# Patient Record
Sex: MALE | Race: WHITE | NOT HISPANIC OR LATINO | Employment: UNEMPLOYED | URBAN - METROPOLITAN AREA
[De-identification: names, ages, dates, MRNs, and addresses within clinical notes are randomized per-mention and may not be internally consistent; named-entity substitution may affect disease eponyms.]

---

## 2019-05-14 ENCOUNTER — HOSPITAL ENCOUNTER (EMERGENCY)
Facility: HOSPITAL | Age: 34
Discharge: HOME | End: 2019-05-14
Attending: EMERGENCY MEDICINE | Admitting: EMERGENCY MEDICINE
Payer: MEDICARE

## 2019-05-14 VITALS
TEMPERATURE: 98.1 F | SYSTOLIC BLOOD PRESSURE: 144 MMHG | DIASTOLIC BLOOD PRESSURE: 77 MMHG | RESPIRATION RATE: 18 BRPM | WEIGHT: 170 LBS | OXYGEN SATURATION: 98 % | HEART RATE: 123 BPM

## 2019-05-14 DIAGNOSIS — T40.1X1A HEROIN OVERDOSE (HCC): Primary | ICD-10-CM

## 2019-05-14 DIAGNOSIS — F41.9 ANXIETY: ICD-10-CM

## 2019-05-14 PROCEDURE — 99284 EMERGENCY DEPT VISIT MOD MDM: CPT

## 2019-05-14 PROCEDURE — 99283 EMERGENCY DEPT VISIT LOW MDM: CPT | Performed by: EMERGENCY MEDICINE

## 2019-05-14 RX ORDER — NALOXONE HYDROCHLORIDE 1 MG/ML
INJECTION INTRAMUSCULAR; INTRAVENOUS; SUBCUTANEOUS
Status: DISCONTINUED
Start: 2019-05-14 | End: 2019-05-14 | Stop reason: HOSPADM

## 2019-05-14 RX ORDER — HYDROXYZINE HYDROCHLORIDE 25 MG/1
25 TABLET, FILM COATED ORAL EVERY 6 HOURS PRN
Qty: 16 TABLET | Refills: 0 | Status: SHIPPED | OUTPATIENT
Start: 2019-05-14 | End: 2019-05-15

## 2019-05-15 ENCOUNTER — HOSPITAL ENCOUNTER (EMERGENCY)
Facility: HOSPITAL | Age: 34
Discharge: HOME | End: 2019-05-16
Attending: EMERGENCY MEDICINE | Admitting: EMERGENCY MEDICINE
Payer: MEDICARE

## 2019-05-15 DIAGNOSIS — T50.901A OVERDOSE: Primary | ICD-10-CM

## 2019-05-15 DIAGNOSIS — T40.1X1A HEROIN OVERDOSE (HCC): ICD-10-CM

## 2019-05-15 LAB
ALBUMIN SERPL BCP-MCNC: 5.2 G/DL (ref 3–5.2)
ALP SERPL-CCNC: 46 U/L (ref 43–122)
ALT SERPL W P-5'-P-CCNC: 27 U/L (ref 9–52)
ANION GAP SERPL CALCULATED.3IONS-SCNC: 13 MMOL/L (ref 5–14)
APAP SERPL-MCNC: <10 UG/ML (ref 10–20)
AST SERPL W P-5'-P-CCNC: 42 U/L (ref 17–59)
BILIRUB SERPL-MCNC: 0.5 MG/DL
BUN SERPL-MCNC: 18 MG/DL (ref 5–25)
CALCIUM SERPL-MCNC: 9.9 MG/DL (ref 8.4–10.2)
CHLORIDE SERPL-SCNC: 100 MMOL/L (ref 97–108)
CO2 SERPL-SCNC: 27 MMOL/L (ref 22–30)
CREAT SERPL-MCNC: 1.15 MG/DL (ref 0.7–1.5)
ERYTHROCYTE [DISTWIDTH] IN BLOOD BY AUTOMATED COUNT: 12.8 %
ETHANOL SERPL-MCNC: <10 MG/DL (ref 0–10)
GFR SERPL CREATININE-BSD FRML MDRD: 84 ML/MIN/1.73SQ M
GLUCOSE SERPL-MCNC: 92 MG/DL (ref 70–99)
HCT VFR BLD AUTO: 46.3 % (ref 41–53)
HGB BLD-MCNC: 15.1 G/DL (ref 13.5–17.5)
LYMPHOCYTES # BLD AUTO: 1.29 THOUSAND/UL (ref 0.5–4)
LYMPHOCYTES # BLD AUTO: 6 % (ref 25–45)
MCH RBC QN AUTO: 30.7 PG (ref 26–34)
MCHC RBC AUTO-ENTMCNC: 32.7 G/DL (ref 31–36)
MCV RBC AUTO: 94 FL (ref 80–100)
MONOCYTES # BLD AUTO: 0.86 THOUSAND/UL (ref 0.2–0.9)
MONOCYTES NFR BLD AUTO: 4 % (ref 1–10)
NEUTS SEG # BLD: 19.35 THOUSAND/UL (ref 1.8–7.8)
NEUTS SEG NFR BLD AUTO: 90 %
PLATELET # BLD AUTO: 269 THOUSANDS/UL (ref 150–450)
PLATELET BLD QL SMEAR: ADEQUATE
PMV BLD AUTO: 7.6 FL (ref 8.9–12.7)
POTASSIUM SERPL-SCNC: 5.4 MMOL/L (ref 3.6–5)
PROT SERPL-MCNC: 8.4 G/DL (ref 5.9–8.4)
RBC # BLD AUTO: 4.93 MILLION/UL (ref 4.5–5.9)
RBC MORPH BLD: NORMAL
SALICYLATES SERPL-MCNC: <1 MG/DL (ref 10–30)
SODIUM SERPL-SCNC: 140 MMOL/L (ref 137–147)
TOTAL CELLS COUNTED SPEC: 100
WBC # BLD AUTO: 21.5 THOUSAND/UL (ref 4.5–11)

## 2019-05-15 PROCEDURE — 80320 DRUG SCREEN QUANTALCOHOLS: CPT | Performed by: EMERGENCY MEDICINE

## 2019-05-15 PROCEDURE — 80329 ANALGESICS NON-OPIOID 1 OR 2: CPT | Performed by: EMERGENCY MEDICINE

## 2019-05-15 PROCEDURE — 96374 THER/PROPH/DIAG INJ IV PUSH: CPT

## 2019-05-15 PROCEDURE — 99284 EMERGENCY DEPT VISIT MOD MDM: CPT

## 2019-05-15 PROCEDURE — 93005 ELECTROCARDIOGRAM TRACING: CPT

## 2019-05-15 PROCEDURE — 85027 COMPLETE CBC AUTOMATED: CPT | Performed by: EMERGENCY MEDICINE

## 2019-05-15 PROCEDURE — 85007 BL SMEAR W/DIFF WBC COUNT: CPT | Performed by: EMERGENCY MEDICINE

## 2019-05-15 PROCEDURE — 99284 EMERGENCY DEPT VISIT MOD MDM: CPT | Performed by: EMERGENCY MEDICINE

## 2019-05-15 PROCEDURE — 80053 COMPREHEN METABOLIC PANEL: CPT | Performed by: EMERGENCY MEDICINE

## 2019-05-15 PROCEDURE — 96361 HYDRATE IV INFUSION ADD-ON: CPT

## 2019-05-15 PROCEDURE — 36415 COLL VENOUS BLD VENIPUNCTURE: CPT | Performed by: EMERGENCY MEDICINE

## 2019-05-15 RX ORDER — LORAZEPAM 2 MG/ML
2 INJECTION INTRAMUSCULAR ONCE
Status: COMPLETED | OUTPATIENT
Start: 2019-05-15 | End: 2019-05-15

## 2019-05-15 RX ADMIN — SODIUM CHLORIDE 1000 ML: 9 INJECTION, SOLUTION INTRAVENOUS at 20:01

## 2019-05-15 RX ADMIN — LORAZEPAM 2 MG: 2 INJECTION INTRAMUSCULAR; INTRAVENOUS at 23:25

## 2019-05-15 RX ADMIN — SODIUM CHLORIDE 1000 ML: 9 INJECTION, SOLUTION INTRAVENOUS at 21:56

## 2019-05-16 VITALS
SYSTOLIC BLOOD PRESSURE: 126 MMHG | TEMPERATURE: 96.9 F | WEIGHT: 162.04 LBS | OXYGEN SATURATION: 96 % | HEART RATE: 97 BPM | RESPIRATION RATE: 18 BRPM | DIASTOLIC BLOOD PRESSURE: 63 MMHG

## 2019-05-16 LAB
AMPHETAMINES SERPL QL SCN: NEGATIVE
ATRIAL RATE: 113 BPM
BARBITURATES UR QL: NEGATIVE
BENZODIAZ UR QL: NEGATIVE
COCAINE UR QL: NEGATIVE
METHADONE UR QL: NEGATIVE
OPIATES UR QL SCN: POSITIVE
P AXIS: 60 DEGREES
PCP UR QL: NEGATIVE
PR INTERVAL: 134 MS
QRS AXIS: 50 DEGREES
QRSD INTERVAL: 80 MS
QT INTERVAL: 326 MS
QTC INTERVAL: 447 MS
T WAVE AXIS: 48 DEGREES
THC UR QL: NEGATIVE
VENTRICULAR RATE: 113 BPM

## 2019-05-16 PROCEDURE — 93010 ELECTROCARDIOGRAM REPORT: CPT | Performed by: INTERNAL MEDICINE

## 2019-05-16 PROCEDURE — 80307 DRUG TEST PRSMV CHEM ANLYZR: CPT | Performed by: EMERGENCY MEDICINE

## 2019-05-16 PROCEDURE — 96361 HYDRATE IV INFUSION ADD-ON: CPT

## 2019-05-16 RX ORDER — NICOTINE 21 MG/24HR
21 PATCH, TRANSDERMAL 24 HOURS TRANSDERMAL ONCE
Status: DISCONTINUED | OUTPATIENT
Start: 2019-05-16 | End: 2019-05-16

## 2019-05-16 RX ORDER — NICOTINE 21 MG/24HR
21 PATCH, TRANSDERMAL 24 HOURS TRANSDERMAL ONCE
Status: DISCONTINUED | OUTPATIENT
Start: 2019-05-16 | End: 2019-05-16 | Stop reason: HOSPADM

## 2019-05-16 RX ORDER — LORAZEPAM 0.5 MG/1
1 TABLET ORAL ONCE
Status: COMPLETED | OUTPATIENT
Start: 2019-05-16 | End: 2019-05-16

## 2019-05-16 RX ADMIN — NICOTINE 21 MG: 21 PATCH, EXTENDED RELEASE TRANSDERMAL at 01:00

## 2019-05-16 RX ADMIN — LORAZEPAM 1 MG: 0.5 TABLET ORAL at 07:40

## 2021-05-10 ENCOUNTER — OFFICE VISIT (OUTPATIENT)
Dept: FAMILY MEDICINE CLINIC | Facility: CLINIC | Age: 36
End: 2021-05-10
Payer: MEDICARE

## 2021-05-10 ENCOUNTER — TELEPHONE (OUTPATIENT)
Dept: FAMILY MEDICINE CLINIC | Facility: CLINIC | Age: 36
End: 2021-05-10

## 2021-05-10 VITALS
BODY MASS INDEX: 25.54 KG/M2 | SYSTOLIC BLOOD PRESSURE: 100 MMHG | WEIGHT: 178.4 LBS | HEIGHT: 70 IN | TEMPERATURE: 95.6 F | DIASTOLIC BLOOD PRESSURE: 70 MMHG

## 2021-05-10 DIAGNOSIS — F41.9 ANXIETY: Primary | ICD-10-CM

## 2021-05-10 DIAGNOSIS — F90.0 ATTENTION DEFICIT HYPERACTIVITY DISORDER (ADHD), PREDOMINANTLY INATTENTIVE TYPE: ICD-10-CM

## 2021-05-10 DIAGNOSIS — F11.90 OPIATE USE: ICD-10-CM

## 2021-05-10 PROBLEM — F98.8 ATTENTION DEFICIT DISORDER (ADD): Status: ACTIVE | Noted: 2021-05-10

## 2021-05-10 PROCEDURE — 3725F SCREEN DEPRESSION PERFORMED: CPT | Performed by: FAMILY MEDICINE

## 2021-05-10 PROCEDURE — 99203 OFFICE O/P NEW LOW 30 MIN: CPT | Performed by: FAMILY MEDICINE

## 2021-05-10 PROCEDURE — 3008F BODY MASS INDEX DOCD: CPT | Performed by: FAMILY MEDICINE

## 2021-05-10 RX ORDER — CLONAZEPAM 2 MG/1
2 TABLET ORAL 2 TIMES DAILY
COMMUNITY

## 2021-05-10 NOTE — TELEPHONE ENCOUNTER
Today at checkout, patient stated he needed to note for [facility] that they might find evidence of Percocet, as he took one today  Have added this to med match list for testing

## 2021-05-10 NOTE — PROGRESS NOTES
Assessment/Plan:  Direct test done at this time  Patient will be called with results  Diagnoses and all orders for this visit:    Anxiety    Attention deficit hyperactivity disorder (ADHD), predominantly inattentive type    Other orders  -     clonazePAM (KlonoPIN) 2 mg tablet; Take 2 mg by mouth 2 (two) times a day            Subjective:        Patient ID: Lise Babcock is a 37 y o  male  Patient is a new patient to establish care  Patient with history of anxiety as well as ADHD  Patient has use Klonopin in the past for with good results  Patient was taking Adderall for ADD  Patient having difficulty focusing and concentrating  Patient making mistakes at work  Patient has use medical marijuana  Patient is on methadone  The following portions of the patient's history were reviewed and updated as appropriate: allergies, current medications, past family history, past medical history, past social history, past surgical history and problem list       Review of Systems   Constitutional: Negative  HENT: Negative  Eyes: Negative  Respiratory: Negative  Cardiovascular: Negative  Gastrointestinal: Negative  Endocrine: Negative  Genitourinary: Negative  Musculoskeletal: Negative  Skin: Negative  Allergic/Immunologic: Negative  Neurological: Negative  Hematological: Negative  Psychiatric/Behavioral: Positive for decreased concentration  Objective:      BMI Counseling: Body mass index is 25 6 kg/m²  The BMI is above normal  Nutrition recommendations include decreasing portion sizes  Exercise recommendations include moderate physical activity 150 minutes/week  /70 (BP Location: Right arm, Patient Position: Sitting, Cuff Size: Standard)   Temp (!) 95 6 °F (35 3 °C) (Tympanic)   Ht 5' 10" (1 778 m)   Wt 80 9 kg (178 lb 6 4 oz)   BMI 25 60 kg/m²          Physical Exam  Vitals signs and nursing note reviewed     Constitutional: General: He is not in acute distress  Appearance: Normal appearance  He is not ill-appearing, toxic-appearing or diaphoretic  HENT:      Head: Normocephalic and atraumatic  Right Ear: Tympanic membrane, ear canal and external ear normal  There is no impacted cerumen  Left Ear: Tympanic membrane, ear canal and external ear normal  There is no impacted cerumen  Nose: Nose normal  No congestion or rhinorrhea  Mouth/Throat:      Mouth: Mucous membranes are moist       Pharynx: No oropharyngeal exudate or posterior oropharyngeal erythema  Eyes:      General: No scleral icterus  Right eye: No discharge  Left eye: No discharge  Extraocular Movements: Extraocular movements intact  Conjunctiva/sclera: Conjunctivae normal       Pupils: Pupils are equal, round, and reactive to light  Neck:      Musculoskeletal: Normal range of motion and neck supple  No neck rigidity or muscular tenderness  Vascular: No carotid bruit  Cardiovascular:      Rate and Rhythm: Normal rate and regular rhythm  Pulses: Normal pulses  Heart sounds: Normal heart sounds  No murmur  No friction rub  No gallop  Pulmonary:      Effort: Pulmonary effort is normal  No respiratory distress  Breath sounds: Normal breath sounds  No stridor  No wheezing, rhonchi or rales  Chest:      Chest wall: No tenderness  Musculoskeletal: Normal range of motion  General: No swelling, tenderness, deformity or signs of injury  Right lower leg: No edema  Left lower leg: No edema  Lymphadenopathy:      Cervical: No cervical adenopathy  Skin:     General: Skin is warm and dry  Capillary Refill: Capillary refill takes less than 2 seconds  Coloration: Skin is not jaundiced  Findings: No bruising, erythema, lesion or rash  Neurological:      General: No focal deficit present  Mental Status: He is alert and oriented to person, place, and time        Cranial Nerves: No cranial nerve deficit  Sensory: No sensory deficit  Motor: No weakness  Coordination: Coordination normal       Gait: Gait normal    Psychiatric:         Mood and Affect: Mood normal          Behavior: Behavior normal          Thought Content:  Thought content normal          Judgment: Judgment normal

## 2021-05-12 LAB
1OH-MIDAZOLAM UR-MCNC: NEGATIVE NG/ML
6MAM UR QL: NEGATIVE NG/ML
A-OH ALPRAZ UR-MCNC: NEGATIVE NG/ML
A-OH-TRIAZOLAM UR-MCNC: NEGATIVE NG/ML
AMPHETAMINES UR QL: NEGATIVE NG/ML
BARBITURATES UR QL: NEGATIVE NG/ML
BENZODIAZ UR QL: POSITIVE NG/ML
BZE UR QL: NEGATIVE NG/ML
CREAT UR-MCNC: 38.9 MG/DL
EDDP UR-MCNC: 7550 NG/ML
ETHANOL UR QL: POSITIVE NG/ML
ETHYL GLUCURONIDE UR-MCNC: 1740 NG/ML
ETHYL SULFATE UR-MCNC: 732 NG/ML
LORAZEPAM UR-MCNC: NEGATIVE NG/ML
METHADONE UR QL: POSITIVE NG/ML
METHADONE UR-MCNC: 2342 NG/ML
NORDIAZEPAM UR-MCNC: NEGATIVE NG/ML
OPIATES UR QL: NEGATIVE NG/ML
OXAZEPAM UR-MCNC: NEGATIVE NG/ML
OXIDANTS UR QL: NEGATIVE MCG/ML
OXYCODONE UR QL: NEGATIVE NG/ML
PCP UR QL: NEGATIVE NG/ML
PH UR: 6.7 [PH] (ref 4.5–9)
SL AMB AMINOCLONAZEPAM: 967 NG/ML
SL AMB HYDROXYETHYLFLURAZEPAM: NEGATIVE NG/ML
TEMAZEPAM UR-MCNC: NEGATIVE NG/ML
THC UR QL: POSITIVE NG/ML
THC UR-MCNC: 55 NG/ML

## 2021-05-17 ENCOUNTER — TELEPHONE (OUTPATIENT)
Dept: FAMILY MEDICINE CLINIC | Facility: CLINIC | Age: 36
End: 2021-05-17

## 2021-05-17 NOTE — TELEPHONE ENCOUNTER
----- Message from Jannette Macario DO sent at 5/16/2021  7:27 AM EDT -----  Call patient  Urine drug screen shows alcohol, benzos, marijuana, methadone  Suggest continuing with methadone clinic  Our office will not be prescribing any controlled substances which will include narcotics, benzos, amphetamines for ADD    Suggest patient sees Psychiatry and Psychology and possible detox physician due to multiple substances being used

## 2021-10-15 ENCOUNTER — HOSPITAL ENCOUNTER (EMERGENCY)
Facility: HOSPITAL | Age: 36
Discharge: HOME | End: 2021-10-15
Attending: EMERGENCY MEDICINE
Payer: MEDICARE

## 2021-10-15 VITALS
HEART RATE: 66 BPM | DIASTOLIC BLOOD PRESSURE: 60 MMHG | SYSTOLIC BLOOD PRESSURE: 109 MMHG | TEMPERATURE: 99 F | RESPIRATION RATE: 18 BRPM | OXYGEN SATURATION: 95 %

## 2021-10-15 DIAGNOSIS — T50.901A ACCIDENTAL OVERDOSE, INITIAL ENCOUNTER: Primary | ICD-10-CM

## 2021-10-15 LAB — GLUCOSE SERPL-MCNC: 110 MG/DL (ref 65–140)

## 2021-10-15 PROCEDURE — 99284 EMERGENCY DEPT VISIT MOD MDM: CPT | Performed by: PHYSICIAN ASSISTANT

## 2021-10-15 PROCEDURE — 82948 REAGENT STRIP/BLOOD GLUCOSE: CPT

## 2021-10-15 PROCEDURE — 99284 EMERGENCY DEPT VISIT MOD MDM: CPT

## 2021-12-16 ENCOUNTER — OFFICE VISIT (OUTPATIENT)
Dept: FAMILY MEDICINE CLINIC | Facility: CLINIC | Age: 36
End: 2021-12-16

## 2021-12-16 VITALS
RESPIRATION RATE: 18 BRPM | HEIGHT: 70 IN | HEART RATE: 129 BPM | OXYGEN SATURATION: 96 % | BODY MASS INDEX: 23.77 KG/M2 | TEMPERATURE: 97.6 F | WEIGHT: 166 LBS | DIASTOLIC BLOOD PRESSURE: 74 MMHG | SYSTOLIC BLOOD PRESSURE: 126 MMHG

## 2021-12-16 DIAGNOSIS — F41.9 ANXIETY: Primary | ICD-10-CM

## 2021-12-16 PROCEDURE — 99213 OFFICE O/P EST LOW 20 MIN: CPT | Performed by: FAMILY MEDICINE

## 2021-12-16 RX ORDER — VENLAFAXINE HYDROCHLORIDE 37.5 MG/1
37.5 CAPSULE, EXTENDED RELEASE ORAL
Qty: 90 CAPSULE | Refills: 0 | Status: SHIPPED | OUTPATIENT
Start: 2021-12-16

## 2021-12-16 RX ORDER — HYDROXYZINE HYDROCHLORIDE 25 MG/1
25 TABLET, FILM COATED ORAL EVERY 6 HOURS PRN
Qty: 90 TABLET | Refills: 0 | Status: SHIPPED | OUTPATIENT
Start: 2021-12-16

## 2023-05-06 ENCOUNTER — HOSPITAL ENCOUNTER (EMERGENCY)
Facility: HOSPITAL | Age: 38
Discharge: HOME | End: 2023-05-06
Attending: EMERGENCY MEDICINE | Admitting: EMERGENCY MEDICINE

## 2023-05-06 VITALS
SYSTOLIC BLOOD PRESSURE: 118 MMHG | DIASTOLIC BLOOD PRESSURE: 71 MMHG | RESPIRATION RATE: 20 BRPM | TEMPERATURE: 98.1 F | OXYGEN SATURATION: 93 % | HEART RATE: 125 BPM

## 2023-05-06 DIAGNOSIS — F19.90 DRUG USE: Primary | ICD-10-CM
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
## 2023-05-11 ENCOUNTER — TELEPHONE (OUTPATIENT)
Dept: FAMILY MEDICINE CLINIC | Facility: CLINIC | Age: 38
End: 2023-05-11

## 2023-05-11 NOTE — TELEPHONE ENCOUNTER
Pt was a N/S for his appt today  I tried to call him to let him know, but could not leave a message because his mailbox was full

## 2023-05-22 ENCOUNTER — OFFICE VISIT (OUTPATIENT)
Dept: FAMILY MEDICINE CLINIC | Facility: CLINIC | Age: 38
End: 2023-05-22

## 2023-05-22 VITALS
HEART RATE: 72 BPM | OXYGEN SATURATION: 98 % | WEIGHT: 155 LBS | HEIGHT: 70 IN | TEMPERATURE: 97.8 F | BODY MASS INDEX: 22.19 KG/M2 | DIASTOLIC BLOOD PRESSURE: 70 MMHG | SYSTOLIC BLOOD PRESSURE: 110 MMHG

## 2023-05-22 DIAGNOSIS — F41.9 ANXIETY: ICD-10-CM

## 2023-05-22 DIAGNOSIS — Z00.00 WELL ADULT EXAM: ICD-10-CM

## 2023-05-22 DIAGNOSIS — F90.0 ATTENTION DEFICIT HYPERACTIVITY DISORDER (ADHD), PREDOMINANTLY INATTENTIVE TYPE: Primary | ICD-10-CM

## 2023-05-22 DIAGNOSIS — Z11.59 NEED FOR HEPATITIS C SCREENING TEST: ICD-10-CM

## 2023-05-22 DIAGNOSIS — Z11.4 SCREENING FOR HIV (HUMAN IMMUNODEFICIENCY VIRUS): ICD-10-CM

## 2023-05-22 DIAGNOSIS — F11.11 NARCOTIC ABUSE IN REMISSION (HCC): ICD-10-CM

## 2023-05-22 RX ORDER — NALOXONE HYDROCHLORIDE 4 MG/.1ML
SPRAY NASAL
Qty: 1 EACH | Refills: 1 | Status: SHIPPED | OUTPATIENT
Start: 2023-05-22 | End: 2024-05-21

## 2023-05-22 RX ORDER — CLONAZEPAM 2 MG/1
2 TABLET ORAL 2 TIMES DAILY
Qty: 60 TABLET | Refills: 0 | Status: SHIPPED | OUTPATIENT
Start: 2023-05-22 | End: 2023-06-21

## 2023-05-22 RX ORDER — DEXTROAMPHETAMINE SACCHARATE, AMPHETAMINE ASPARTATE, DEXTROAMPHETAMINE SULFATE AND AMPHETAMINE SULFATE 5; 5; 5; 5 MG/1; MG/1; MG/1; MG/1
1 TABLET ORAL 2 TIMES DAILY
Qty: 60 TABLET | Refills: 0 | Status: SHIPPED | OUTPATIENT
Start: 2023-05-22 | End: 2023-06-21

## 2023-05-22 RX ORDER — DEXTROAMPHETAMINE SACCHARATE, AMPHETAMINE ASPARTATE, DEXTROAMPHETAMINE SULFATE AND AMPHETAMINE SULFATE 5; 5; 5; 5 MG/1; MG/1; MG/1; MG/1
1 TABLET ORAL 2 TIMES DAILY
COMMUNITY
Start: 2023-04-26 | End: 2023-05-22 | Stop reason: SDUPTHER
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
## 2023-05-26 LAB
6MAM UR QL CFM: NEGATIVE NG/ML
7AMINOCLONAZEPAM UR QL CFM: NORMAL NG/ML
A-OH ALPRAZ UR QL CFM: NEGATIVE NG/ML
ACCEPTABLE CREAT UR QL: NORMAL MG/DL
ACCEPTIBLE SP GR UR QL: NORMAL
AMPHET UR QL CFM: NORMAL NG/ML
BUPRENORPHINE UR QL CFM: NEGATIVE NG/ML
BUTALBITAL UR QL CFM: NEGATIVE NG/ML
BZE UR QL CFM: NEGATIVE NG/ML
CODEINE UR QL CFM: NEGATIVE NG/ML
EDDP UR QL CFM: NORMAL NG/ML
ETHYL GLUCURONIDE UR QL CFM: NEGATIVE NG/ML
ETHYL SULFATE UR QL SCN: NEGATIVE NG/ML
EUTYLONE UR QL: NEGATIVE NG/ML
FENTANYL UR QL CFM: ABNORMAL NG/ML
GLIADIN IGG SER IA-ACNC: NEGATIVE NG/ML
HYDROCODONE UR QL CFM: NEGATIVE NG/ML
HYDROMORPHONE UR QL CFM: NEGATIVE NG/ML
LORAZEPAM UR QL CFM: NEGATIVE NG/ML
ME-PHENIDATE UR QL CFM: NEGATIVE NG/ML
MEPERIDINE UR QL CFM: NEGATIVE NG/ML
METHADONE UR QL CFM: NORMAL NG/ML
METHAMPHET UR QL CFM: NEGATIVE NG/ML
MORPHINE UR QL CFM: ABNORMAL NG/ML
NALTREXONE UR QL CFM: NEGATIVE NG/ML
NITRITE UR QL: NORMAL UG/ML
NORBUPRENORPHINE UR QL CFM: NEGATIVE NG/ML
NORDIAZEPAM UR QL CFM: NEGATIVE NG/ML
NORFENTANYL UR QL CFM: NEGATIVE NG/ML
NORHYDROCODONE UR QL CFM: NEGATIVE NG/ML
NORMEPERIDINE UR QL CFM: NEGATIVE NG/ML
NOROXYCODONE UR QL CFM: NEGATIVE NG/ML
OXAZEPAM UR QL CFM: NEGATIVE NG/ML
OXYCODONE UR QL CFM: NEGATIVE NG/ML
OXYMORPHONE UR QL CFM: NEGATIVE NG/ML
PARA-FLUOROFENTANYL QUANTIFICATION: NEGATIVE NG/ML
PHENOBARB UR QL CFM: NEGATIVE NG/ML
RESULT ALL_PRESCRIBED MEDS AND SPECIAL INSTRUCTIONS: NORMAL
SECOBARBITAL UR QL CFM: NEGATIVE NG/ML
SL AMB 4-ANPP QUANTIFICATION: NEGATIVE NG/ML
SL AMB 5F-ADB-M7 METABOLITE QUANTIFICATION: NEGATIVE NG/ML
SL AMB 7-OH-MITRAGYNINE (KRATOM ALKALOID) QUANTIFICATION: NEGATIVE NG/ML
SL AMB AB-FUBINACA-M3 METABOLITE QUANTIFICATION: NEGATIVE NG/ML
SL AMB ACETYL FENTANYL QUANTIFICATION: NEGATIVE NG/ML
SL AMB ACETYL NORFENTANYL QUANTIFICATION: NEGATIVE NG/ML
SL AMB ACRYL FENTANYL QUANTIFICATION: NEGATIVE NG/ML
SL AMB CARFENTANIL QUANTIFICATION: NEGATIVE NG/ML
SL AMB CTHC (MARIJUANA METABOLITE) QUANTIFICATION: ABNORMAL NG/ML
SL AMB DEXTRORPHAN (DEXTROMETHORPHAN METABOLITE) QUANT: NEGATIVE NG/ML
SL AMB GABAPENTIN QUANTIFICATION: NEGATIVE
SL AMB JWH018 METABOLITE QUANTIFICATION: NEGATIVE NG/ML
SL AMB JWH073 METABOLITE QUANTIFICATION: NEGATIVE NG/ML
SL AMB MDMB-FUBINACA-M1 METABOLITE QUANTIFICATION: NEGATIVE NG/ML
SL AMB METHYLONE QUANTIFICATION: NEGATIVE NG/ML
SL AMB N-DESMETHYL-TRAMADOL QUANTIFICATION: NEGATIVE NG/ML
SL AMB PHENTERMINE QUANTIFICATION: NEGATIVE NG/ML
SL AMB PREGABALIN QUANTIFICATION: NEGATIVE
SL AMB RCS4 METABOLITE QUANTIFICATION: NEGATIVE NG/ML
SL AMB RITALINIC ACID QUANTIFICATION: NEGATIVE NG/ML
SMOOTH MUSCLE AB TITR SER IF: NEGATIVE NG/ML
SPECIMEN DRAWN SERPL: NEGATIVE NG/ML
SPECIMEN PH ACCEPTABLE UR: NORMAL
TAPENTADOL UR QL CFM: NEGATIVE NG/ML
TEMAZEPAM UR QL CFM: NEGATIVE NG/ML
TRAMADOL UR QL CFM: NEGATIVE NG/ML
URATE/CREAT 24H UR: NEGATIVE NG/ML

## 2023-06-19 ENCOUNTER — OFFICE VISIT (OUTPATIENT)
Dept: FAMILY MEDICINE CLINIC | Facility: CLINIC | Age: 38
End: 2023-06-19
Payer: COMMERCIAL

## 2023-06-19 VITALS
TEMPERATURE: 97.5 F | HEART RATE: 92 BPM | BODY MASS INDEX: 22.71 KG/M2 | DIASTOLIC BLOOD PRESSURE: 68 MMHG | SYSTOLIC BLOOD PRESSURE: 94 MMHG | OXYGEN SATURATION: 98 % | HEIGHT: 70 IN | WEIGHT: 158.6 LBS

## 2023-06-19 DIAGNOSIS — F90.0 ATTENTION DEFICIT HYPERACTIVITY DISORDER (ADHD), PREDOMINANTLY INATTENTIVE TYPE: ICD-10-CM

## 2023-06-19 DIAGNOSIS — F41.9 ANXIETY: ICD-10-CM

## 2023-06-19 DIAGNOSIS — Z11.4 SCREENING FOR HIV (HUMAN IMMUNODEFICIENCY VIRUS): ICD-10-CM

## 2023-06-19 DIAGNOSIS — Z00.00 ANNUAL PHYSICAL EXAM: Primary | ICD-10-CM

## 2023-06-19 DIAGNOSIS — R76.8 HEPATITIS C ANTIBODY POSITIVE IN BLOOD: ICD-10-CM

## 2023-06-19 PROCEDURE — 99395 PREV VISIT EST AGE 18-39: CPT | Performed by: FAMILY MEDICINE

## 2023-06-19 PROCEDURE — 99214 OFFICE O/P EST MOD 30 MIN: CPT | Performed by: FAMILY MEDICINE

## 2023-06-19 RX ORDER — DEXTROAMPHETAMINE SACCHARATE, AMPHETAMINE ASPARTATE, DEXTROAMPHETAMINE SULFATE AND AMPHETAMINE SULFATE 5; 5; 5; 5 MG/1; MG/1; MG/1; MG/1
1 TABLET ORAL 2 TIMES DAILY
Qty: 60 TABLET | Refills: 0 | Status: SHIPPED | OUTPATIENT
Start: 2023-06-19 | End: 2023-07-19

## 2023-06-19 RX ORDER — CLONAZEPAM 2 MG/1
2 TABLET ORAL 2 TIMES DAILY
Qty: 60 TABLET | Refills: 0 | Status: SHIPPED | OUTPATIENT
Start: 2023-06-19 | End: 2023-07-19

## 2023-06-19 NOTE — PROGRESS NOTES
[facility]    NAME: Lidia Gipson  AGE: 30 y o  SEX: male  : 1985     DATE: 2023     Assessment and Plan:     Problem List Items Addressed This Visit        Other    Attention deficit disorder (ADD)    Relevant Medications    amphetamine-dextroamphetamine (ADDERALL) 20 mg tablet    clonazePAM (KlonoPIN) 2 mg tablet    Anxiety    Relevant Medications    clonazePAM (KlonoPIN) 2 mg tablet   Other Visit Diagnoses     Annual physical exam    -  Primary    Hepatitis C antibody positive in blood        Relevant Orders    Hepatitis C Ab W/Refl To HCV RNA, Qn, PCR    Screening for HIV (human immunodeficiency virus)        Relevant Orders    HIV 1/2 AG/AB w Reflex [facility] for 2 yr old and above          Immunizations and preventive care screenings were discussed with patient today  Appropriate education was printed on patient's after visit summary  Counseling:  Alcohol/drug use: discussed moderation in alcohol intake, the recommendations for healthy alcohol use, and avoidance of illicit drug use  Dental Health: discussed importance of regular tooth brushing, flossing, and dental visits  Injury prevention: discussed safety/seat belts, safety helmets, smoke detectors, carbon dioxide detectors, and smoking near bedding or upholstery  Sexual health: discussed sexually transmitted diseases, partner selection, use of condoms, avoidance of unintended pregnancy, and contraceptive alternatives  · Exercise: the importance of regular exercise/physical activity was discussed  Recommend exercise 3-5 times per week for at least 30 minutes  Return in 1 year (on 2024)       Chief Complaint:     Chief Complaint   Patient presents with   • Well Check     Patient is here today for an annual physical        History of Present Illness:     Adult Annual Physical   Patient here for a comprehensive physical exam  The patient reports problems - see list     Diet and Physical Activity  · Diet/Nutrition: well balanced diet  · Exercise: no formal exercise  Depression Screening  PHQ-2/9 Depression Screening         General Health  · Sleep: sleeps well  · Hearing: normal - bilateral   · Vision: no vision problems  · Dental: no dental visits for >1 year   Health  · History of STDs?: yes  Review of Systems:     Review of Systems   Constitutional: Negative  HENT: Negative  Eyes: Negative  Respiratory: Negative  Cardiovascular: Negative  Gastrointestinal: Negative  Endocrine: Negative  Genitourinary: Negative  Musculoskeletal: Negative  Skin: Negative  Allergic/Immunologic: Negative  Neurological: Negative  Hematological: Negative  Psychiatric/Behavioral: Negative  All other systems reviewed and are negative  Past Medical History:     Past Medical History:   Diagnosis Date   • [facility]       Past Surgical History:     History reviewed  No pertinent surgical history     Social History:     Social History     Socioeconomic History   • Marital status: Single     Spouse name: None   • Number of children: None   • Years of education: None   • Highest education level: None   Occupational History   • Occupation: self employed gun shop   Tobacco Use   • Smoking status: Former     Types: Cigarettes   • Smokeless tobacco: Never   Vaping Use   • Vaping Use: Every day   • Substances: Nicotine   Substance and Sexual Activity   • Alcohol use: Yes     Comment: a beer a month   • Drug use: Yes     Types: Marijuana   • Sexual activity: None   Other Topics Concern   • None   Social History Narrative   • None     Social Determinants of Health     Financial Resource Strain: Low Risk  (12/16/2021)    Overall Financial Resource Strain (CARDIA)    • Difficulty of Paying Living Expenses: Not hard at all   Food Insecurity: No Food Insecurity (12/16/2021)    Hunger Vital Sign    • Worried About "Running Out of Food in the Last Year: Never true    • Ran Out of Food in the Last Year: Never true   Transportation Needs: No Transportation Needs (12/16/2021)    PRAPARE - Transportation    • Lack of Transportation (Medical): No    • Lack of Transportation (Non-Medical): No   Physical Activity: Not on file   Stress: Not on file   Social Connections: Not on file   Intimate Partner Violence: Not on file   Housing Stability: Not on file      Family History:     Family History   Problem Relation Age of Onset   • No Known Problems Mother    • No Known Problems Father       Current Medications:     Current Outpatient Medications   Medication Sig Dispense Refill   • amphetamine-dextroamphetamine (ADDERALL) 20 mg tablet Take 1 tablet (20 mg total) by mouth 2 (two) times a day Max Daily Amount: 40 mg 60 tablet 0   • clonazePAM (KlonoPIN) 2 mg tablet Take 1 tablet (2 mg total) by mouth 2 (two) times a day 60 tablet 0   • METHADONE HCL PO Take by mouth     • naloxone (NARCAN) 4 mg/0 1 mL nasal spray Administer 1 spray into a nostril  If no response after 2-3 minutes, give another dose in the other nostril using a new spray  1 each 1     No current facility-administered medications for this visit  Allergies:     No Known Allergies   Physical Exam:     BP 94/68 (BP Location: Right arm, Patient Position: Sitting, Cuff Size: Standard)   Pulse 92   Temp 97 5 °F (36 4 °C) (Tympanic)   Ht 5' 10\" (1 778 m)   Wt 71 9 kg (158 lb 9 6 oz)   SpO2 98%   BMI 22 76 kg/m²     Physical Exam  Vitals and nursing note reviewed  Constitutional:       Appearance: Normal appearance  He is well-developed  HENT:      Head: Normocephalic  Nose: Nose normal    Eyes:      Conjunctiva/sclera: Conjunctivae normal       Pupils: Pupils are equal, round, and reactive to light  Cardiovascular:      Rate and Rhythm: Normal rate and regular rhythm  Pulses: Normal pulses  Heart sounds: Normal heart sounds     Pulmonary:      Effort: " Pulmonary effort is normal       Breath sounds: Normal breath sounds  Abdominal:      General: Bowel sounds are normal       Palpations: Abdomen is soft  Musculoskeletal:         General: Normal range of motion  Cervical back: Normal range of motion and neck supple  Skin:     General: Skin is warm and dry  Neurological:      General: No focal deficit present  Mental Status: He is alert  Psychiatric:         Mood and Affect: Mood normal          Behavior: Behavior normal          Thought Content:  Thought content normal          Judgment: Judgment normal           [facility]   [id number] [facility]

## 2023-07-17 ENCOUNTER — OFFICE VISIT (OUTPATIENT)
Dept: FAMILY MEDICINE CLINIC | Facility: CLINIC | Age: 38
End: 2023-07-17
Payer: COMMERCIAL

## 2023-07-17 VITALS
HEART RATE: 86 BPM | BODY MASS INDEX: 22.9 KG/M2 | SYSTOLIC BLOOD PRESSURE: 100 MMHG | OXYGEN SATURATION: 96 % | HEIGHT: 70 IN | WEIGHT: 160 LBS | TEMPERATURE: 97.6 F | DIASTOLIC BLOOD PRESSURE: 70 MMHG | RESPIRATION RATE: 18 BRPM

## 2023-07-17 DIAGNOSIS — Z20.2 STD EXPOSURE: ICD-10-CM

## 2023-07-17 DIAGNOSIS — F41.9 ANXIETY: ICD-10-CM

## 2023-07-17 DIAGNOSIS — F90.0 ATTENTION DEFICIT HYPERACTIVITY DISORDER (ADHD), PREDOMINANTLY INATTENTIVE TYPE: Primary | ICD-10-CM

## 2023-07-17 PROCEDURE — 99214 OFFICE O/P EST MOD 30 MIN: CPT | Performed by: FAMILY MEDICINE

## 2023-07-17 RX ORDER — DEXTROAMPHETAMINE SACCHARATE, AMPHETAMINE ASPARTATE, DEXTROAMPHETAMINE SULFATE AND AMPHETAMINE SULFATE 5; 5; 5; 5 MG/1; MG/1; MG/1; MG/1
1 TABLET ORAL 2 TIMES DAILY
Qty: 60 TABLET | Refills: 0 | Status: SHIPPED | OUTPATIENT
Start: 2023-07-17 | End: 2023-08-16

## 2023-07-17 RX ORDER — CLONAZEPAM 2 MG/1
2 TABLET ORAL 2 TIMES DAILY
Qty: 60 TABLET | Refills: 0 | Status: SHIPPED | OUTPATIENT
Start: 2023-07-17 | End: 2023-07-18 | Stop reason: SDUPTHER

## 2023-07-17 NOTE — PROGRESS NOTES
Name: Jose Alvarado      : 1985      MRN: [id number]  Encounter Provider: Baltazar Christopher DO  Encounter Date: 2023   Encounter department: [address] [place] [address]     1. Attention deficit hyperactivity disorder (ADHD), predominantly inattentive type  -     amphetamine-dextroamphetamine (ADDERALL) 20 mg tablet; Take 1 tablet (20 mg total) by mouth 2 (two) times a day Max Daily Amount: 40 mg  -     clonazePAM (KlonoPIN) 2 mg tablet; Take 1 tablet (2 mg total) by mouth 2 (two) times a day    2. Anxiety  -     clonazePAM (KlonoPIN) 2 mg tablet; Take 1 tablet (2 mg total) by mouth 2 (two) times a day    3. STD exposure  -     Chlamydia/GC amplified DNA by PCR; Future  -     Chlamydia/GC amplified DNA by PCR           Subjective      HPI  Review of Systems    Current Outpatient Medications on File Prior to Visit   Medication Sig   • METHADONE HCL PO Take by mouth   • naloxone (NARCAN) 4 mg/0.1 mL nasal spray Administer 1 spray into a nostril. If no response after 2-3 minutes, give another dose in the other nostril using a new spray.    • [DISCONTINUED] amphetamine-dextroamphetamine (ADDERALL) 20 mg tablet Take 1 tablet (20 mg total) by mouth 2 (two) times a day Max Daily Amount: 40 mg   • [DISCONTINUED] clonazePAM (KlonoPIN) 2 mg tablet Take 1 tablet (2 mg total) by mouth 2 (two) times a day       Objective     /70 (BP Location: Right arm, Patient Position: Sitting, Cuff Size: Adult)   Pulse 86   Temp 97.6 °F (36.4 °C) (Tympanic)   Resp 18   Ht 5' 10" (1.778 m)   Wt 72.6 kg (160 lb)   SpO2 96%   BMI 22.96 kg/m²     Physical Exam  Baltazar Christopher DO

## 2023-07-18 DIAGNOSIS — F41.9 ANXIETY: ICD-10-CM

## 2023-07-18 DIAGNOSIS — F90.0 ATTENTION DEFICIT HYPERACTIVITY DISORDER (ADHD), PREDOMINANTLY INATTENTIVE TYPE: ICD-10-CM

## 2023-07-18 RX ORDER — CLONAZEPAM 2 MG/1
2 TABLET ORAL 2 TIMES DAILY
Qty: 60 TABLET | Refills: 0 | Status: SHIPPED | OUTPATIENT
Start: 2023-07-18 | End: 2023-08-17

## 2023-08-17 ENCOUNTER — OFFICE VISIT (OUTPATIENT)
Dept: FAMILY MEDICINE CLINIC | Facility: CLINIC | Age: 38
End: 2023-08-17
Payer: COMMERCIAL

## 2023-08-17 VITALS
WEIGHT: 157.6 LBS | HEIGHT: 70 IN | DIASTOLIC BLOOD PRESSURE: 70 MMHG | TEMPERATURE: 98.4 F | SYSTOLIC BLOOD PRESSURE: 108 MMHG | HEART RATE: 96 BPM | BODY MASS INDEX: 22.56 KG/M2 | OXYGEN SATURATION: 99 %

## 2023-08-17 DIAGNOSIS — F41.9 ANXIETY: ICD-10-CM

## 2023-08-17 DIAGNOSIS — F90.0 ATTENTION DEFICIT HYPERACTIVITY DISORDER (ADHD), PREDOMINANTLY INATTENTIVE TYPE: ICD-10-CM

## 2023-08-17 PROCEDURE — 99214 OFFICE O/P EST MOD 30 MIN: CPT | Performed by: FAMILY MEDICINE

## 2023-08-17 RX ORDER — DEXTROAMPHETAMINE SACCHARATE, AMPHETAMINE ASPARTATE, DEXTROAMPHETAMINE SULFATE AND AMPHETAMINE SULFATE 5; 5; 5; 5 MG/1; MG/1; MG/1; MG/1
1 TABLET ORAL 2 TIMES DAILY
Qty: 60 TABLET | Refills: 0 | Status: SHIPPED | OUTPATIENT
Start: 2023-08-17 | End: 2023-09-16

## 2023-08-17 RX ORDER — CLONAZEPAM 2 MG/1
2 TABLET ORAL 2 TIMES DAILY
Qty: 60 TABLET | Refills: 0 | Status: SHIPPED | OUTPATIENT
Start: 2023-08-17 | End: 2023-09-16

## 2023-08-17 NOTE — PROGRESS NOTES
Assessment/Plan:      Diagnoses and all orders for this visit:    Attention deficit hyperactivity disorder (ADHD), predominantly inattentive type  -     amphetamine-dextroamphetamine (ADDERALL) 20 mg tablet; Take 1 tablet (20 mg total) by mouth 2 (two) times a day Max Daily Amount: 40 mg  -     clonazePAM (KlonoPIN) 2 mg tablet; Take 1 tablet (2 mg total) by mouth 2 (two) times a day    Anxiety  -     clonazePAM (KlonoPIN) 2 mg tablet; Take 1 tablet (2 mg total) by mouth 2 (two) times a day          Subjective:     Patient ID: Arlin Grijalva is a 30 y.o. male. Patient presents with:  Medication Refill: Pt is here for meds refill. Pt has no other concerns. LS    He is doing well. No complaints. Medication Refill        Review of Systems   Constitutional: Negative. HENT: Negative. Eyes: Negative. Respiratory: Negative. Cardiovascular: Negative. Gastrointestinal: Negative. Endocrine: Negative. Genitourinary: Negative. Musculoskeletal: Negative. Skin: Negative. Allergic/Immunologic: Negative. Neurological: Negative. Hematological: Negative. Psychiatric/Behavioral: Negative. All other systems reviewed and are negative. Objective:     Physical Exam  Vitals and nursing note reviewed. Constitutional:       Appearance: Normal appearance. He is well-developed. HENT:      Head: Normocephalic and atraumatic. Right Ear: External ear normal.      Left Ear: External ear normal.      Nose: Nose normal.   Eyes:      Conjunctiva/sclera: Conjunctivae normal.      Pupils: Pupils are equal, round, and reactive to light. Cardiovascular:      Rate and Rhythm: Normal rate and regular rhythm. Heart sounds: Normal heart sounds. Pulmonary:      Effort: Pulmonary effort is normal.      Breath sounds: Normal breath sounds. Abdominal:      General: Bowel sounds are normal.      Palpations: Abdomen is soft. Musculoskeletal:         General: Normal range of motion. Cervical back: Normal range of motion and neck supple. Skin:     General: Skin is warm and dry. Neurological:      General: No focal deficit present. Mental Status: He is alert and oriented to person, place, and time. Deep Tendon Reflexes: Reflexes are normal and symmetric.    Psychiatric:         Mood and Affect: Mood normal.         Behavior: Behavior normal.

## 2023-09-18 ENCOUNTER — OFFICE VISIT (OUTPATIENT)
Dept: FAMILY MEDICINE CLINIC | Facility: CLINIC | Age: 38
End: 2023-09-18
Payer: COMMERCIAL

## 2023-09-18 VITALS
SYSTOLIC BLOOD PRESSURE: 108 MMHG | HEIGHT: 70 IN | BODY MASS INDEX: 22.99 KG/M2 | DIASTOLIC BLOOD PRESSURE: 70 MMHG | HEART RATE: 79 BPM | OXYGEN SATURATION: 98 % | TEMPERATURE: 97.9 F | WEIGHT: 160.6 LBS

## 2023-09-18 DIAGNOSIS — F90.0 ATTENTION DEFICIT HYPERACTIVITY DISORDER (ADHD), PREDOMINANTLY INATTENTIVE TYPE: Primary | ICD-10-CM

## 2023-09-18 DIAGNOSIS — F41.9 ANXIETY: ICD-10-CM

## 2023-09-18 PROCEDURE — 99214 OFFICE O/P EST MOD 30 MIN: CPT | Performed by: FAMILY MEDICINE

## 2023-09-18 RX ORDER — DEXTROAMPHETAMINE SACCHARATE, AMPHETAMINE ASPARTATE, DEXTROAMPHETAMINE SULFATE AND AMPHETAMINE SULFATE 5; 5; 5; 5 MG/1; MG/1; MG/1; MG/1
1 TABLET ORAL 2 TIMES DAILY
Qty: 60 TABLET | Refills: 0 | Status: SHIPPED | OUTPATIENT
Start: 2023-09-18 | End: 2023-10-18

## 2023-09-18 RX ORDER — CLONAZEPAM 2 MG/1
2 TABLET ORAL 3 TIMES DAILY
Qty: 90 TABLET | Refills: 0 | Status: SHIPPED | OUTPATIENT
Start: 2023-09-18 | End: 2023-10-18

## 2023-09-18 NOTE — PROGRESS NOTES
Name: Esme Cruz      : 1985      MRN: [id number]  Encounter Provider: Deanna Alford DO  Encounter Date: 2023   Encounter department: [facility]     1. Attention deficit hyperactivity disorder (ADHD), predominantly inattentive type  -     clonazePAM (KlonoPIN) 2 mg tablet; Take 1 tablet (2 mg total) by mouth 3 (three) times a day  -     amphetamine-dextroamphetamine (ADDERALL) 20 mg tablet; Take 1 tablet (20 mg total) by mouth 2 (two) times a day Max Daily Amount: 40 mg    2. Anxiety  -     clonazePAM (KlonoPIN) 2 mg tablet; Take 1 tablet (2 mg total) by mouth 3 (three) times a day    Continue current therapy. We will follow-up in a month. Katalina      Kirchen today for follow-up. He is doing well. Although he is sometimes running out of clonazepam because he did take an extra half or so as needed. He does continue to follow-up with the methadone program.    Review of Systems   Constitutional: Negative. HENT: Negative. Eyes: Negative. Respiratory: Negative. Cardiovascular: Negative. Gastrointestinal: Negative. Endocrine: Negative. Genitourinary: Negative. Musculoskeletal: Negative. Skin: Negative. Allergic/Immunologic: Negative. Neurological: Negative. Hematological: Negative. Psychiatric/Behavioral: Negative. All other systems reviewed and are negative. Current Outpatient Medications on File Prior to Visit   Medication Sig   • METHADONE HCL PO Take by mouth   • [DISCONTINUED] amphetamine-dextroamphetamine (ADDERALL) 20 mg tablet Take 1 tablet (20 mg total) by mouth 2 (two) times a day Max Daily Amount: 40 mg   • [DISCONTINUED] clonazePAM (KlonoPIN) 2 mg tablet Take 1 tablet (2 mg total) by mouth 2 (two) times a day   • naloxone (NARCAN) 4 mg/0.1 mL nasal spray Administer 1 spray into a nostril. If no response after 2-3 minutes, give another dose in the other nostril using a new spray. (Patient not taking: Reported on 8/17/2023)       Objective     /70 (BP Location: Right arm, Patient Position: Sitting, Cuff Size: Large)   Pulse 79   Temp 97.9 °F (36.6 °C) (Tympanic)   Ht 5' 10" (1.778 m)   Wt 72.8 kg (160 lb 9.6 oz)   SpO2 98%   BMI 23.04 kg/m²     Physical Exam  Vitals and nursing note reviewed. Constitutional:       Appearance: He is well-developed. HENT:      Head: Normocephalic and atraumatic. Eyes:      Pupils: Pupils are equal, round, and reactive to light. Cardiovascular:      Rate and Rhythm: Normal rate. Pulmonary:      Effort: Pulmonary effort is normal.      Breath sounds: No wheezing. Abdominal:      Palpations: Abdomen is soft. Musculoskeletal:      Cervical back: Normal range of motion and neck supple. Lymphadenopathy:      Cervical: No cervical adenopathy. Skin:     General: Skin is warm. Neurological:      General: No focal deficit present. Mental Status: He is alert and oriented to person, place, and time.    Psychiatric:         Mood and Affect: Mood normal.       Komal Griggs DO

## 2023-12-18 ENCOUNTER — OFFICE VISIT (OUTPATIENT)
Dept: FAMILY MEDICINE CLINIC | Facility: CLINIC | Age: 38
End: 2023-12-18
Payer: COMMERCIAL

## 2023-12-18 VITALS
HEART RATE: 90 BPM | DIASTOLIC BLOOD PRESSURE: 80 MMHG | TEMPERATURE: 98 F | SYSTOLIC BLOOD PRESSURE: 120 MMHG | WEIGHT: 167.4 LBS | HEIGHT: 70 IN | BODY MASS INDEX: 23.96 KG/M2 | OXYGEN SATURATION: 96 %

## 2023-12-18 DIAGNOSIS — F41.9 ANXIETY: ICD-10-CM

## 2023-12-18 DIAGNOSIS — F90.0 ATTENTION DEFICIT HYPERACTIVITY DISORDER (ADHD), PREDOMINANTLY INATTENTIVE TYPE: ICD-10-CM

## 2023-12-18 PROCEDURE — 99214 OFFICE O/P EST MOD 30 MIN: CPT | Performed by: FAMILY MEDICINE

## 2023-12-18 RX ORDER — DEXTROAMPHETAMINE SACCHARATE, AMPHETAMINE ASPARTATE, DEXTROAMPHETAMINE SULFATE AND AMPHETAMINE SULFATE 5; 5; 5; 5 MG/1; MG/1; MG/1; MG/1
1 TABLET ORAL 2 TIMES DAILY
Qty: 60 TABLET | Refills: 0 | Status: SHIPPED | OUTPATIENT
Start: 2023-12-18 | End: 2024-01-17

## 2023-12-18 RX ORDER — CLONAZEPAM 2 MG/1
2 TABLET ORAL 3 TIMES DAILY
Qty: 90 TABLET | Refills: 0 | Status: SHIPPED | OUTPATIENT
Start: 2023-12-18 | End: 2024-01-17

## 2023-12-18 NOTE — PROGRESS NOTES
Assessment/Plan:      Diagnoses and all orders for this visit:    Attention deficit hyperactivity disorder (ADHD), predominantly inattentive type  -     amphetamine-dextroamphetamine (ADDERALL) 20 mg tablet; Take 1 tablet (20 mg total) by mouth 2 (two) times a day Max Daily Amount: 40 mg  -     clonazePAM (KlonoPIN) 2 mg tablet; Take 1 tablet (2 mg total) by mouth 3 (three) times a day    Anxiety  -     clonazePAM (KlonoPIN) 2 mg tablet; Take 1 tablet (2 mg total) by mouth 3 (three) times a day     Continue current therapy.  He will continue counseling and follow-up at the methadone clinic.  Will follow-up in 3 months.    Subjective:     Patient ID: Kapil Cordova is a 38 y.o. male.    Patient presents with:  Medication Refill: No other problems at this time   [facility]        Medication Refill        Review of Systems   Constitutional: Negative.    HENT: Negative.     Eyes: Negative.    Respiratory: Negative.     Cardiovascular: Negative.    Gastrointestinal: Negative.    Endocrine: Negative.    Genitourinary: Negative.    Musculoskeletal: Negative.    Skin: Negative.    Allergic/Immunologic: Negative.    Neurological: Negative.    Hematological: Negative.    Psychiatric/Behavioral: Negative.     All other systems reviewed and are negative.        Objective:     Physical Exam  Vitals and nursing note reviewed.   Constitutional:       Appearance: Normal appearance. He is well-developed.   HENT:      Head: Normocephalic and atraumatic.      Right Ear: External ear normal.      Left Ear: External ear normal.      Nose: Nose normal.   Eyes:      Conjunctiva/sclera: Conjunctivae normal.      Pupils: Pupils are equal, round, and reactive to light.   Cardiovascular:      Rate and Rhythm: Normal rate and regular rhythm.      Pulses: Normal pulses.      Heart sounds: Normal heart sounds.   Pulmonary:      Effort: Pulmonary effort is normal.      Breath sounds: Normal breath sounds.   Abdominal:      General: Bowel sounds are  normal.      Palpations: Abdomen is soft.   Musculoskeletal:         General: Normal range of motion.      Cervical back: Normal range of motion and neck supple.   Skin:     General: Skin is warm and dry.   Neurological:      General: No focal deficit present.      Mental Status: He is alert and oriented to person, place, and time.      Deep Tendon Reflexes: Reflexes are normal and symmetric.   Psychiatric:         Mood and Affect: Mood normal.         Behavior: Behavior normal.

## 2024-01-17 ENCOUNTER — OFFICE VISIT (OUTPATIENT)
Dept: FAMILY MEDICINE CLINIC | Facility: CLINIC | Age: 39
End: 2024-01-17
Payer: COMMERCIAL

## 2024-01-17 VITALS
DIASTOLIC BLOOD PRESSURE: 60 MMHG | BODY MASS INDEX: 25.8 KG/M2 | OXYGEN SATURATION: 90 % | SYSTOLIC BLOOD PRESSURE: 80 MMHG | TEMPERATURE: 97.5 F | WEIGHT: 179.8 LBS | HEART RATE: 55 BPM

## 2024-01-17 DIAGNOSIS — F41.9 ANXIETY: ICD-10-CM

## 2024-01-17 DIAGNOSIS — F90.0 ATTENTION DEFICIT HYPERACTIVITY DISORDER (ADHD), PREDOMINANTLY INATTENTIVE TYPE: Primary | ICD-10-CM

## 2024-01-17 PROCEDURE — 99214 OFFICE O/P EST MOD 30 MIN: CPT | Performed by: FAMILY MEDICINE

## 2024-01-17 RX ORDER — DEXTROAMPHETAMINE SACCHARATE, AMPHETAMINE ASPARTATE, DEXTROAMPHETAMINE SULFATE AND AMPHETAMINE SULFATE 5; 5; 5; 5 MG/1; MG/1; MG/1; MG/1
1 TABLET ORAL 2 TIMES DAILY
Qty: 60 TABLET | Refills: 0 | Status: SHIPPED | OUTPATIENT
Start: 2024-01-17 | End: 2024-02-16

## 2024-01-17 RX ORDER — ALPRAZOLAM 2 MG/1
2 TABLET, ORALLY DISINTEGRATING ORAL 3 TIMES DAILY PRN
Qty: 90 TABLET | Refills: 0 | Status: SHIPPED | OUTPATIENT
Start: 2024-01-17

## 2024-01-17 RX ORDER — CLONAZEPAM 2 MG/1
2 TABLET ORAL 3 TIMES DAILY
Qty: 90 TABLET | Refills: 0 | Status: CANCELLED | OUTPATIENT
Start: 2024-01-17 | End: 2024-02-16

## 2024-01-17 NOTE — PROGRESS NOTES
Assessment/Plan:      Diagnoses and all orders for this visit:    Attention deficit hyperactivity disorder (ADHD), predominantly inattentive type  -     amphetamine-dextroamphetamine (ADDERALL) 20 mg tablet; Take 1 tablet (20 mg total) by mouth 2 (two) times a day Max Daily Amount: 40 mg    Anxiety  -     ALPRAZolam (NIRAVAM) 2 MG dissolvable tablet; Take 1 tablet (2 mg total) by mouth 3 (three) times a day as needed for anxiety     Continue to follow-up with the methadone clinic.  Follow-up here in 3 months.    Subjective:     Patient ID: Kapil Cordova is a 38 y.o. male.    He is in today for medication management.  He is requesting alprazolam instead of clonazepam.  He feels it works better for him.        Review of Systems   Constitutional: Negative.    HENT: Negative.     Eyes: Negative.    Respiratory: Negative.     Cardiovascular: Negative.    Gastrointestinal: Negative.    Endocrine: Negative.    Genitourinary: Negative.    Musculoskeletal: Negative.    Skin: Negative.    Allergic/Immunologic: Negative.    Neurological: Negative.    Hematological: Negative.    Psychiatric/Behavioral:  Positive for dysphoric mood. The patient is nervous/anxious.    All other systems reviewed and are negative.        Objective:     Physical Exam  Vitals and nursing note reviewed.   Constitutional:       Appearance: He is well-developed.   HENT:      Head: Normocephalic and atraumatic.   Eyes:      Pupils: Pupils are equal, round, and reactive to light.   Cardiovascular:      Rate and Rhythm: Normal rate.   Pulmonary:      Effort: Pulmonary effort is normal.      Breath sounds: No wheezing.   Abdominal:      Palpations: Abdomen is soft.   Musculoskeletal:      Cervical back: Normal range of motion and neck supple.   Lymphadenopathy:      Cervical: No cervical adenopathy.   Skin:     General: Skin is warm.   Neurological:      General: No focal deficit present.      Mental Status: He is alert and oriented to person, place, and  time.   Psychiatric:         Mood and Affect: Mood normal.

## 2024-02-14 ENCOUNTER — OFFICE VISIT (OUTPATIENT)
Dept: FAMILY MEDICINE CLINIC | Facility: CLINIC | Age: 39
End: 2024-02-14
Payer: COMMERCIAL

## 2024-02-14 VITALS
BODY MASS INDEX: 25.25 KG/M2 | TEMPERATURE: 98.8 F | WEIGHT: 176 LBS | DIASTOLIC BLOOD PRESSURE: 82 MMHG | HEART RATE: 107 BPM | SYSTOLIC BLOOD PRESSURE: 100 MMHG | OXYGEN SATURATION: 99 %

## 2024-02-14 DIAGNOSIS — F41.9 ANXIETY: Primary | ICD-10-CM

## 2024-02-14 DIAGNOSIS — F90.0 ATTENTION DEFICIT HYPERACTIVITY DISORDER (ADHD), PREDOMINANTLY INATTENTIVE TYPE: ICD-10-CM

## 2024-02-14 DIAGNOSIS — F41.9 ANXIETY: ICD-10-CM

## 2024-02-14 PROCEDURE — 99214 OFFICE O/P EST MOD 30 MIN: CPT | Performed by: FAMILY MEDICINE

## 2024-02-14 RX ORDER — DEXTROAMPHETAMINE SACCHARATE, AMPHETAMINE ASPARTATE, DEXTROAMPHETAMINE SULFATE AND AMPHETAMINE SULFATE 5; 5; 5; 5 MG/1; MG/1; MG/1; MG/1
1 TABLET ORAL 2 TIMES DAILY
Qty: 60 TABLET | Refills: 0 | Status: SHIPPED | OUTPATIENT
Start: 2024-02-14 | End: 2024-03-15

## 2024-02-14 RX ORDER — ALPRAZOLAM 2 MG/1
2 TABLET ORAL 3 TIMES DAILY PRN
Qty: 90 TABLET | Refills: 2 | Status: SHIPPED | OUTPATIENT
Start: 2024-02-14 | End: 2024-02-14 | Stop reason: SDUPTHER

## 2024-02-14 RX ORDER — ALPRAZOLAM 2 MG/1
2 TABLET ORAL 3 TIMES DAILY PRN
Qty: 90 TABLET | Refills: 2 | Status: SHIPPED | OUTPATIENT
Start: 2024-02-14 | End: 2024-05-14

## 2024-02-14 NOTE — TELEPHONE ENCOUNTER
OTHER PHARMACY DOES NOT HAVE THEM IN STOCK PLEASE SEND MEDICATION TO [facility] [address] [facility].

## 2024-02-14 NOTE — PROGRESS NOTES
Name: Kapil Cordova      : 1985      MRN: [id number]  Encounter Provider: Reese Gomes DO  Encounter Date: 2024   Encounter department: [facility]    Assessment & Plan     1. Anxiety  -     ALPRAZolam (XANAX) 2 MG tablet; Take 1 tablet (2 mg total) by mouth 3 (three) times a day as needed for anxiety    2. Attention deficit hyperactivity disorder (ADHD), predominantly inattentive type  -     amphetamine-dextroamphetamine (ADDERALL) 20 mg tablet; Take 1 tablet (20 mg total) by mouth 2 (two) times a day Max Daily Amount: 40 mg    We did talk about trying to cut back on his alprazolam use.  Right now he is trying to get off the methadone and he feels he needs the alprazolam.  We reviewed his medication use.  We reviewed his labs from December also his ER visit.  He is trying to stay away from situations that would cause relapse.  Will see him back in a month.  Subjective      Present today for follow-up.  He is doing well.  He still goes to the methadone clinic.  We reviewed his labs today.      Review of Systems   Constitutional: Negative.    HENT: Negative.     Eyes: Negative.    Respiratory: Negative.     Cardiovascular: Negative.    Gastrointestinal: Negative.    Endocrine: Negative.    Genitourinary: Negative.    Musculoskeletal: Negative.    Skin: Negative.    Allergic/Immunologic: Negative.    Neurological: Negative.    Hematological: Negative.    Psychiatric/Behavioral:  Positive for decreased concentration. The patient is nervous/anxious.    All other systems reviewed and are negative.      Current Outpatient Medications on File Prior to Visit   Medication Sig   • METHADONE HCL PO Take by mouth   • [DISCONTINUED] ALPRAZolam (NIRAVAM) 2 MG dissolvable tablet Take 1 tablet (2 mg total) by mouth 3 (three) times a day as needed for anxiety   • [DISCONTINUED] amphetamine-dextroamphetamine (ADDERALL) 20 mg tablet Take 1 tablet (20 mg total) by mouth 2 (two) times a day Max  Daily Amount: 40 mg   • naloxone (NARCAN) 4 mg/0.1 mL nasal spray Administer 1 spray into a nostril. If no response after 2-3 minutes, give another dose in the other nostril using a new spray. (Patient not taking: Reported on 8/17/2023)       Objective     /82 (BP Location: Left arm, Patient Position: Sitting, Cuff Size: Standard)   Pulse (!) 107   Temp 98.8 °F (37.1 °C) (Temporal)   Wt 79.8 kg (176 lb)   SpO2 99%   BMI 25.25 kg/m²     Physical Exam  Vitals and nursing note reviewed.   Constitutional:       Appearance: He is well-developed.   HENT:      Head: Normocephalic and atraumatic.   Eyes:      Pupils: Pupils are equal, round, and reactive to light.   Cardiovascular:      Rate and Rhythm: Normal rate.   Pulmonary:      Effort: Pulmonary effort is normal.      Breath sounds: No wheezing.   Abdominal:      Palpations: Abdomen is soft.   Musculoskeletal:      Cervical back: Normal range of motion and neck supple.   Lymphadenopathy:      Cervical: No cervical adenopathy.   Skin:     General: Skin is warm.   Neurological:      General: No focal deficit present.      Mental Status: He is alert and oriented to person, place, and time.   Psychiatric:         Mood and Affect: Mood normal.       Reese Matrone, DO

## 2024-03-28 ENCOUNTER — OFFICE VISIT (OUTPATIENT)
Dept: FAMILY MEDICINE CLINIC | Facility: CLINIC | Age: 39
End: 2024-03-28
Payer: COMMERCIAL

## 2024-03-28 ENCOUNTER — TELEPHONE (OUTPATIENT)
Dept: FAMILY MEDICINE CLINIC | Facility: CLINIC | Age: 39
End: 2024-03-28

## 2024-03-28 VITALS
HEART RATE: 57 BPM | TEMPERATURE: 97.5 F | SYSTOLIC BLOOD PRESSURE: 90 MMHG | OXYGEN SATURATION: 97 % | DIASTOLIC BLOOD PRESSURE: 60 MMHG | WEIGHT: 183 LBS | BODY MASS INDEX: 26.26 KG/M2

## 2024-03-28 DIAGNOSIS — Z11.59 NEED FOR HEPATITIS C SCREENING TEST: ICD-10-CM

## 2024-03-28 DIAGNOSIS — Z00.00 WELL ADULT EXAM: ICD-10-CM

## 2024-03-28 DIAGNOSIS — Z11.4 SCREENING FOR HIV (HUMAN IMMUNODEFICIENCY VIRUS): ICD-10-CM

## 2024-03-28 DIAGNOSIS — F41.9 ANXIETY: ICD-10-CM

## 2024-03-28 DIAGNOSIS — F90.0 ATTENTION DEFICIT HYPERACTIVITY DISORDER (ADHD), PREDOMINANTLY INATTENTIVE TYPE: Primary | ICD-10-CM

## 2024-03-28 PROCEDURE — 99214 OFFICE O/P EST MOD 30 MIN: CPT | Performed by: FAMILY MEDICINE

## 2024-03-28 RX ORDER — DEXTROAMPHETAMINE SACCHARATE, AMPHETAMINE ASPARTATE, DEXTROAMPHETAMINE SULFATE AND AMPHETAMINE SULFATE 5; 5; 5; 5 MG/1; MG/1; MG/1; MG/1
1 TABLET ORAL 2 TIMES DAILY
Qty: 60 TABLET | Refills: 0 | Status: SHIPPED | OUTPATIENT
Start: 2024-03-28 | End: 2024-04-27

## 2024-03-28 NOTE — PROGRESS NOTES
Assessment/Plan:      Diagnoses and all orders for this visit:    Attention deficit hyperactivity disorder (ADHD), predominantly inattentive type  -     amphetamine-dextroamphetamine (ADDERALL) 20 mg tablet; Take 1 tablet (20 mg total) by mouth 2 (two) times a day Max Daily Amount: 40 mg    Anxiety    Well adult exam  -     Hemoglobin A1C  -     Comprehensive metabolic panel  -     CBC and differential  -     Lipid panel  -     TSH, 3rd generation with Free T4 reflex    Need for hepatitis C screening test  -     Hepatitis C Antibody; Future    Screening for HIV (human immunodeficiency virus)  -     HIV 1/2 AG/AB w Reflex [facility] for 2 yr old and above; Future          Subjective:     Patient ID: Kapil Cordova is a 38 y.o. male.    He is a methadone program.  He is anxious to wean from it.  But they are weaning him very slowly.  He would like to switch to Suboxone.        Review of Systems   Constitutional: Negative.    Respiratory: Negative.     Cardiovascular: Negative.    Gastrointestinal:  Positive for nausea.   Psychiatric/Behavioral:  Negative for behavioral problems. The patient is nervous/anxious.          Objective:     Physical Exam  Vitals and nursing note reviewed.   Constitutional:       General: He is not in acute distress.     Appearance: He is well-developed. He is not ill-appearing.   HENT:      Head: Normocephalic and atraumatic.   Eyes:      Pupils: Pupils are equal, round, and reactive to light.   Cardiovascular:      Rate and Rhythm: Normal rate and regular rhythm.   Pulmonary:      Effort: Pulmonary effort is normal.      Breath sounds: No wheezing.   Abdominal:      General: Abdomen is flat. There is no distension.      Palpations: Abdomen is soft. There is no mass.   Musculoskeletal:      Cervical back: Normal range of motion and neck supple.   Lymphadenopathy:      Cervical: No cervical adenopathy.   Skin:     General: Skin is warm.   Neurological:      General: No focal deficit present.       Mental Status: He is alert and oriented to person, place, and time.   Psychiatric:         Mood and Affect: Mood normal.

## 2024-03-28 NOTE — TELEPHONE ENCOUNTER
PA for amphetamine-dextroamphetamine (ADDERALL) 20 mg tablet     Submitted via    [x]CMM-KEY [id number]  []Surescripts-Case ID #   []Faxed to plan   []Other website   []Phone call Case ID #     Office notes sent, clinical questions answered. Awaiting determination    Turnaround time for your insurance to make a decision on your Prior Authorization can take 7-21 business days.

## 2024-03-28 NOTE — TELEPHONE ENCOUNTER
Patient was in today for an appt.  He said his pharmacy informed him that he needs a prior auth for his Adderall.

## 2024-03-29 NOTE — TELEPHONE ENCOUNTER
PA for amphetamine-dextroamphetamine (ADDERALL) 20 mg tablet  Approved   Date(s) approved 3/28/24-3/28/25  Case #    Patient advised by [] [employer] Message                      [x] Phone call       Pharmacy advised by [x]Fax                                     []Phone call    Approval letter scanned into Media Yes

## 2024-05-15 ENCOUNTER — TELEPHONE (OUTPATIENT)
Dept: FAMILY MEDICINE CLINIC | Facility: CLINIC | Age: 39
End: 2024-05-15

## 2024-05-15 NOTE — TELEPHONE ENCOUNTER
Attempted to call the patient to let him know he was a no-show, but I could not leave a voicemail.

## 2024-05-21 ENCOUNTER — OFFICE VISIT (OUTPATIENT)
Dept: FAMILY MEDICINE CLINIC | Facility: CLINIC | Age: 39
End: 2024-05-21
Payer: COMMERCIAL

## 2024-05-21 VITALS
HEIGHT: 71 IN | WEIGHT: 172.4 LBS | OXYGEN SATURATION: 98 % | HEART RATE: 117 BPM | DIASTOLIC BLOOD PRESSURE: 60 MMHG | BODY MASS INDEX: 24.14 KG/M2 | SYSTOLIC BLOOD PRESSURE: 100 MMHG | TEMPERATURE: 98.7 F

## 2024-05-21 DIAGNOSIS — F41.9 ANXIETY: ICD-10-CM

## 2024-05-21 DIAGNOSIS — F90.0 ATTENTION DEFICIT HYPERACTIVITY DISORDER (ADHD), PREDOMINANTLY INATTENTIVE TYPE: Primary | ICD-10-CM

## 2024-05-21 PROCEDURE — 99214 OFFICE O/P EST MOD 30 MIN: CPT | Performed by: FAMILY MEDICINE

## 2024-05-21 RX ORDER — DEXTROAMPHETAMINE SACCHARATE, AMPHETAMINE ASPARTATE, DEXTROAMPHETAMINE SULFATE AND AMPHETAMINE SULFATE 5; 5; 5; 5 MG/1; MG/1; MG/1; MG/1
1 TABLET ORAL 2 TIMES DAILY
Qty: 60 TABLET | Refills: 0 | Status: SHIPPED | OUTPATIENT
Start: 2024-05-21 | End: 2024-06-20

## 2024-05-21 RX ORDER — ALPRAZOLAM 2 MG/1
2 TABLET ORAL 3 TIMES DAILY PRN
Qty: 90 TABLET | Refills: 2 | Status: SHIPPED | OUTPATIENT
Start: 2024-05-21 | End: 2024-08-19

## 2024-05-21 NOTE — PROGRESS NOTES
Assessment/Plan:      Diagnoses and all orders for this visit:    Attention deficit hyperactivity disorder (ADHD), predominantly inattentive type  -     amphetamine-dextroamphetamine (ADDERALL) 20 mg tablet; Take 1 tablet (20 mg total) by mouth 2 (two) times a day Max Daily Amount: 40 mg    Anxiety  -     ALPRAZolam (XANAX) 2 MG tablet; Take 1 tablet (2 mg total) by mouth 3 (three) times a day as needed for anxiety     I did review his recent lab work.  He will follow-up with Dr. Elizabeth for the Suboxone.  Will see him back here in 3 months.    Subjective:     Patient ID: Kapil Cordova is a 38 y.o. male.    Patient presents with:  Follow-up: Reg F/U adhd, anxiety. Refills needed.   No other questions concerns or problems (CS)    He is off the do not and has started Suboxone.        Review of Systems   Constitutional: Negative.    HENT: Negative.     Eyes: Negative.    Respiratory: Negative.     Cardiovascular: Negative.    Gastrointestinal: Negative.    Endocrine: Negative.    Genitourinary: Negative.    Musculoskeletal: Negative.    Skin: Negative.    Allergic/Immunologic: Negative.    Neurological: Negative.    Hematological: Negative.    Psychiatric/Behavioral: Negative.     All other systems reviewed and are negative.        Objective:     Physical Exam  Vitals and nursing note reviewed.   Constitutional:       Appearance: He is well-developed.   HENT:      Head: Normocephalic and atraumatic.   Eyes:      Pupils: Pupils are equal, round, and reactive to light.   Cardiovascular:      Rate and Rhythm: Normal rate.   Pulmonary:      Effort: Pulmonary effort is normal.      Breath sounds: No wheezing.   Abdominal:      Palpations: Abdomen is soft.   Musculoskeletal:      Cervical back: Normal range of motion and neck supple.   Lymphadenopathy:      Cervical: No cervical adenopathy.   Skin:     General: Skin is warm.   Neurological:      General: No focal deficit present.      Mental Status: He is alert and oriented to  person, place, and time.   Psychiatric:         Mood and Affect: Mood normal.

## 2024-06-27 ENCOUNTER — OFFICE VISIT (OUTPATIENT)
Dept: FAMILY MEDICINE CLINIC | Facility: CLINIC | Age: 39
End: 2024-06-27
Payer: COMMERCIAL

## 2024-06-27 VITALS
SYSTOLIC BLOOD PRESSURE: 130 MMHG | BODY MASS INDEX: 24.34 KG/M2 | OXYGEN SATURATION: 97 % | TEMPERATURE: 98.2 F | WEIGHT: 170 LBS | DIASTOLIC BLOOD PRESSURE: 90 MMHG | HEIGHT: 70 IN | HEART RATE: 113 BPM

## 2024-06-27 DIAGNOSIS — E55.9 VITAMIN D DEFICIENCY: ICD-10-CM

## 2024-06-27 DIAGNOSIS — F90.0 ATTENTION DEFICIT HYPERACTIVITY DISORDER (ADHD), PREDOMINANTLY INATTENTIVE TYPE: ICD-10-CM

## 2024-06-27 DIAGNOSIS — F41.9 ANXIETY: ICD-10-CM

## 2024-06-27 DIAGNOSIS — Z00.00 ANNUAL PHYSICAL EXAM: Primary | ICD-10-CM

## 2024-06-27 PROCEDURE — 99395 PREV VISIT EST AGE 18-39: CPT | Performed by: FAMILY MEDICINE

## 2024-06-27 PROCEDURE — 99214 OFFICE O/P EST MOD 30 MIN: CPT | Performed by: FAMILY MEDICINE

## 2024-06-27 RX ORDER — VITAMIN K2 90 MCG
1 CAPSULE ORAL DAILY
Qty: 90 CAPSULE | Refills: 1 | Status: SHIPPED | OUTPATIENT
Start: 2024-06-27 | End: 2024-12-24

## 2024-06-27 RX ORDER — DEXTROAMPHETAMINE SACCHARATE, AMPHETAMINE ASPARTATE, DEXTROAMPHETAMINE SULFATE AND AMPHETAMINE SULFATE 5; 5; 5; 5 MG/1; MG/1; MG/1; MG/1
1 TABLET ORAL 2 TIMES DAILY
Qty: 60 TABLET | Refills: 0 | Status: SHIPPED | OUTPATIENT
Start: 2024-06-27 | End: 2024-07-27

## 2024-06-27 RX ORDER — NALOXONE HYDROCHLORIDE 4 MG/.1ML
SPRAY NASAL
Qty: 1 EACH | Refills: 1 | Status: SHIPPED | OUTPATIENT
Start: 2024-06-27 | End: 2025-06-27

## 2024-06-27 RX ORDER — ALPRAZOLAM 2 MG/1
2 TABLET ORAL 3 TIMES DAILY PRN
Qty: 90 TABLET | Refills: 2 | Status: SHIPPED | OUTPATIENT
Start: 2024-06-27 | End: 2024-09-25

## 2024-06-27 NOTE — PROGRESS NOTES
Adult Annual Physical  Name: Kapil Cordova      : 1985      MRN: [id number]  Encounter Provider: Reese Gomes DO  Encounter Date: 2024   Encounter department: [facility]    Assessment & Plan   1. Annual physical exam  2. Attention deficit hyperactivity disorder (ADHD), predominantly inattentive type  -     amphetamine-dextroamphetamine (ADDERALL) 20 mg tablet; Take 1 tablet (20 mg total) by mouth 2 (two) times a day Max Daily Amount: 40 mg  -     naloxone (NARCAN) 4 mg/0.1 mL nasal spray; Administer 1 spray into a nostril. If no response after 2-3 minutes, give another dose in the other nostril using a new spray.  3. Anxiety  -     ALPRAZolam (XANAX) 2 MG tablet; Take 1 tablet (2 mg total) by mouth 3 (three) times a day as needed for anxiety  4. Vitamin D deficiency  -     Cholecalciferol (Vitamin D3) 1000 units CAPS; Take 1 capsule by mouth daily    Immunizations and preventive care screenings were discussed with patient today. Appropriate education was printed on patient's after visit summary.    Counseling:  Alcohol/drug use: discussed moderation in alcohol intake, the recommendations for healthy alcohol use, and avoidance of illicit drug use.  Dental Health: discussed importance of regular tooth brushing, flossing, and dental visits.  Injury prevention: discussed safety/seat belts, safety helmets, smoke detectors, carbon dioxide detectors, and smoking near bedding or upholstery.  Sexual health: discussed sexually transmitted diseases, partner selection, use of condoms, avoidance of unintended pregnancy, and contraceptive alternatives.  Exercise: the importance of regular exercise/physical activity was discussed. Recommend exercise 3-5 times per week for at least 30 minutes.          History of Present Illness     Adult Annual Physical:  Patient presents for annual physical.     Diet and Physical Activity:  - Diet/Nutrition: well balanced diet.  - Exercise: moderate  "cardiovascular exercise and walking.    General Health:  - Sleep: sleeps well.  - Hearing: normal hearing right ear.  - Vision: no vision problems.  - Dental: regular dental visits and no dental visits for > 1 year.     Health:  - History of STDs: no.   - Urinary symptoms: none.     Advanced Care Planning:  - Has an advanced directive?: no    - Has a durable medical POA?: no    - ACP document given to patient?: no      Review of Systems   Constitutional: Negative.    HENT: Negative.     Eyes: Negative.    Respiratory: Negative.     Cardiovascular: Negative.    Gastrointestinal: Negative.    Endocrine: Negative.    Genitourinary: Negative.    Musculoskeletal: Negative.    Skin: Negative.    Allergic/Immunologic: Negative.    Neurological: Negative.    Hematological: Negative.    Psychiatric/Behavioral: Negative.     All other systems reviewed and are negative.    Pertinent Medical History   Past Medical History:   Diagnosis Date   • Heroin abuse (HCC)              Objective     /90 (BP Location: Right arm, Patient Position: Sitting, Cuff Size: Standard)   Pulse (!) 113   Temp 98.2 °F (36.8 °C) (Temporal)   Ht 5' 10\" (1.778 m)   Wt 77.1 kg (170 lb)   SpO2 97%   BMI 24.39 kg/m²     Physical Exam  Vitals and nursing note reviewed.   Constitutional:       Appearance: Normal appearance. He is well-developed.   HENT:      Head: Normocephalic.      Nose: Nose normal.   Eyes:      Conjunctiva/sclera: Conjunctivae normal.      Pupils: Pupils are equal, round, and reactive to light.   Cardiovascular:      Rate and Rhythm: Normal rate and regular rhythm.      Pulses: Normal pulses.      Heart sounds: Normal heart sounds.   Pulmonary:      Effort: Pulmonary effort is normal.      Breath sounds: Normal breath sounds.   Abdominal:      General: Abdomen is flat. Bowel sounds are normal.      Palpations: Abdomen is soft.   Musculoskeletal:         General: Normal range of motion.      Cervical back: Normal range of " motion and neck supple.   Skin:     General: Skin is warm and dry.   Neurological:      General: No focal deficit present.      Mental Status: He is alert and oriented to person, place, and time.   Psychiatric:         Mood and Affect: Mood normal.         Behavior: Behavior normal.         Thought Content: Thought content normal.         Judgment: Judgment normal.       Administrative Statements

## 2024-08-26 ENCOUNTER — OFFICE VISIT (OUTPATIENT)
Dept: FAMILY MEDICINE CLINIC | Facility: CLINIC | Age: 39
End: 2024-08-26
Payer: COMMERCIAL

## 2024-08-26 VITALS
SYSTOLIC BLOOD PRESSURE: 100 MMHG | OXYGEN SATURATION: 99 % | WEIGHT: 160.4 LBS | TEMPERATURE: 98 F | BODY MASS INDEX: 22.96 KG/M2 | DIASTOLIC BLOOD PRESSURE: 70 MMHG | HEART RATE: 78 BPM | HEIGHT: 70 IN

## 2024-08-26 DIAGNOSIS — F41.9 ANXIETY: ICD-10-CM

## 2024-08-26 DIAGNOSIS — E55.9 VITAMIN D DEFICIENCY: ICD-10-CM

## 2024-08-26 DIAGNOSIS — F90.0 ATTENTION DEFICIT HYPERACTIVITY DISORDER (ADHD), PREDOMINANTLY INATTENTIVE TYPE: Primary | ICD-10-CM

## 2024-08-26 DIAGNOSIS — Z00.00 WELL ADULT EXAM: ICD-10-CM

## 2024-08-26 PROCEDURE — 99214 OFFICE O/P EST MOD 30 MIN: CPT | Performed by: FAMILY MEDICINE

## 2024-08-26 RX ORDER — BUPRENORPHINE 8 MG/1
TABLET SUBLINGUAL
COMMUNITY
Start: 2024-08-01

## 2024-08-26 RX ORDER — CLONIDINE HYDROCHLORIDE 0.1 MG/1
0.1 TABLET ORAL 3 TIMES DAILY PRN
Qty: 90 TABLET | Refills: 1 | Status: SHIPPED | OUTPATIENT
Start: 2024-08-26

## 2024-08-26 RX ORDER — CLONIDINE HYDROCHLORIDE 0.1 MG/1
0.1 TABLET ORAL 3 TIMES DAILY PRN
COMMUNITY
Start: 2024-07-30 | End: 2024-08-26 | Stop reason: SDUPTHER

## 2024-08-26 RX ORDER — ALPRAZOLAM 2 MG
2 TABLET ORAL 3 TIMES DAILY PRN
Qty: 90 TABLET | Refills: 2 | Status: SHIPPED | OUTPATIENT
Start: 2024-08-26 | End: 2024-11-24

## 2024-08-26 RX ORDER — MULTIVITAMIN WITH IRON
1 TABLET ORAL DAILY
Qty: 90 TABLET | Refills: 1 | Status: SHIPPED | OUTPATIENT
Start: 2024-08-26

## 2024-08-26 RX ORDER — DEXTROAMPHETAMINE SACCHARATE, AMPHETAMINE ASPARTATE, DEXTROAMPHETAMINE SULFATE AND AMPHETAMINE SULFATE 5; 5; 5; 5 MG/1; MG/1; MG/1; MG/1
1 TABLET ORAL 2 TIMES DAILY
Qty: 60 TABLET | Refills: 0 | Status: SHIPPED | OUTPATIENT
Start: 2024-08-26 | End: 2024-09-25

## 2024-08-26 NOTE — PROGRESS NOTES
Assessment/Plan:      He is doing well continue current therapy.  Follow-up in 3 months with fasting lab work.       Diagnoses and all orders for this visit:    Attention deficit hyperactivity disorder (ADHD), predominantly inattentive type  -     amphetamine-dextroamphetamine (ADDERALL) 20 mg tablet; Take 1 tablet (20 mg total) by mouth 2 (two) times a day Max Daily Amount: 40 mg  -     B Complex-C (B-complex with vitamin C) tablet; Take 1 tablet by mouth daily    Anxiety  -     ALPRAZolam (XANAX) 2 MG tablet; Take 1 tablet (2 mg total) by mouth 3 (three) times a day as needed for anxiety  -     cloNIDine (CATAPRES) 0.1 mg tablet; Take 1 tablet (0.1 mg total) by mouth 3 (three) times a day as needed (anxiety)    Vitamin D deficiency  -     Vitamin D 25 hydroxy; Future  -     Vitamin D 25 hydroxy    Well adult exam  -     Comprehensive metabolic panel; Future  -     CBC; Future  -     Lipid Panel with Direct LDL reflex  -     Vitamin D 25 hydroxy; Future  -     Comprehensive metabolic panel  -     CBC  -     Vitamin D 25 hydroxy    Other orders  -     Discontinue: cloNIDine (CATAPRES) 0.1 mg tablet; Take 0.1 mg by mouth 3 (three) times a day as needed  -     buprenorphine (SUBUTEX) 8 mg; 1 TABLET UNDER THE TONGUE AND ALLOW TO DISSOLVE DAILY 2 TIMES PER 30            Subjective:        Patient ID: Kapil Cordova is a 38 y.o. male.      Patient presents with:  Follow-up: Patient is here for routine follow up and to get medication refills. Patient would like to know if he can a script for vitamins as well. He has no further concerns to discuss today, ng              The following portions of the patient's history were reviewed and updated as appropriate: allergies, current medications, past family history, past medical history, past social history, past surgical history, and problem list.      Review of Systems   Constitutional: Negative.    HENT: Negative.     Eyes: Negative.    Respiratory: Negative.    "  Cardiovascular: Negative.    Gastrointestinal: Negative.    Endocrine: Negative.    Genitourinary: Negative.    Musculoskeletal: Negative.    Skin: Negative.    Allergic/Immunologic: Negative.    Neurological: Negative.    Hematological: Negative.    Psychiatric/Behavioral: Negative.     All other systems reviewed and are negative.          Objective:        Depression Screening and Follow-up Plan: Patient was screened for depression during today's encounter. They screened negative with a PHQ-2 score of 0.          /70 (BP Location: Right arm, Patient Position: Sitting, Cuff Size: Standard)   Pulse 78   Temp 98 °F (36.7 °C) (Tympanic)   Ht 5' 10\" (1.778 m)   Wt 72.8 kg (160 lb 6.4 oz)   SpO2 99%   BMI 23.02 kg/m²          Physical Exam  Vitals and nursing note reviewed.   Constitutional:       Appearance: He is well-developed.   HENT:      Head: Normocephalic and atraumatic.   Eyes:      Pupils: Pupils are equal, round, and reactive to light.   Cardiovascular:      Rate and Rhythm: Normal rate.   Pulmonary:      Effort: Pulmonary effort is normal.      Breath sounds: No wheezing.   Abdominal:      Palpations: Abdomen is soft.   Musculoskeletal:      Cervical back: Normal range of motion and neck supple.   Lymphadenopathy:      Cervical: No cervical adenopathy.   Skin:     General: Skin is warm.   Neurological:      General: No focal deficit present.      Mental Status: He is alert and oriented to person, place, and time.   Psychiatric:         Mood and Affect: Mood normal.         "

## 2024-08-27 ENCOUNTER — HOSPITAL ENCOUNTER (EMERGENCY)
Facility: HOSPITAL | Age: 39
Discharge: HOME | End: 2024-08-27
Attending: EMERGENCY MEDICINE
Payer: COMMERCIAL

## 2024-08-27 VITALS
TEMPERATURE: 99.1 F | HEART RATE: 85 BPM | SYSTOLIC BLOOD PRESSURE: 117 MMHG | RESPIRATION RATE: 18 BRPM | WEIGHT: 149.91 LBS | DIASTOLIC BLOOD PRESSURE: 68 MMHG | OXYGEN SATURATION: 94 % | BODY MASS INDEX: 21.51 KG/M2

## 2024-08-27 DIAGNOSIS — F19.10 DRUG ABUSE (HCC): Primary | ICD-10-CM

## 2024-08-27 PROCEDURE — 99284 EMERGENCY DEPT VISIT MOD MDM: CPT

## 2024-08-29 NOTE — ED PROVIDER NOTES
History  Chief Complaint   Patient presents with    Overdose - Accidental     Pt brought by [employer] and [employer], found by bystanders in an alley after injecting his girlfriend's suboxone. Pt denies CP, SOB, is Aox4, and requesting to leave.      38-year-old male presents today with concerns of being found in an alley way after injecting his girlfriends Suboxone.  He did 1 film worth of an injection.  Patient is awake and alert and he denies any symptoms.  States that he would like to leave.        Prior to Admission Medications   Prescriptions Last Dose Informant Patient Reported? Taking?   ALPRAZolam (XANAX) 2 MG tablet   No No   Sig: Take 1 tablet (2 mg total) by mouth 3 (three) times a day as needed for anxiety   B Complex-C (B-complex with vitamin C) tablet   No No   Sig: Take 1 tablet by mouth daily   Cholecalciferol (Vitamin D3) 1000 units CAPS   No No   Sig: Take 1 capsule by mouth daily   amphetamine-dextroamphetamine (ADDERALL) 20 mg tablet   No No   Sig: Take 1 tablet (20 mg total) by mouth 2 (two) times a day Max Daily Amount: 40 mg   buprenorphine (SUBUTEX) 8 mg   Yes No   Si TABLET UNDER THE TONGUE AND ALLOW TO DISSOLVE DAILY 2 TIMES PER 30   buprenorphine-naloxone (Suboxone) 8-2 mg   Yes No   Sig: PLACE 2 FILMS UNDER TONGUE EVERY MORNING   cloNIDine (CATAPRES) 0.1 mg tablet   No No   Sig: Take 1 tablet (0.1 mg total) by mouth 3 (three) times a day as needed (anxiety)   naloxone (NARCAN) 4 mg/0.1 mL nasal spray  Self No No   Sig: Administer 1 spray into a nostril. If no response after 2-3 minutes, give another dose in the other nostril using a new spray.   naloxone (NARCAN) 4 mg/0.1 mL nasal spray   No No   Sig: Administer 1 spray into a nostril. If no response after 2-3 minutes, give another dose in the other nostril using a new spray.      Facility-Administered Medications: None       Past Medical History:   Diagnosis Date    Heroin abuse (HCC)        History reviewed. No pertinent surgical  history.    Family History   Problem Relation Age of Onset    No Known Problems Mother     No Known Problems Father      I have reviewed and agree with the history as documented.    E-Cigarette/Vaping    E-Cigarette Use Current Every Day User      E-Cigarette/Vaping Substances    Nicotine Yes     THC No     CBD No     Flavoring No     Other No     Unknown No      Social History     Tobacco Use    Smoking status: Former     Types: Cigarettes    Smokeless tobacco: Never   Vaping Use    Vaping status: Every Day    Substances: Nicotine   Substance Use Topics    Alcohol use: Yes     Comment: a beer a month    Drug use: Yes     Types: Marijuana       Review of Systems   Constitutional:  Negative for chills and fever.   HENT:  Negative for ear pain and sore throat.    Eyes:  Negative for pain and visual disturbance.   Respiratory:  Negative for cough and shortness of breath.    Cardiovascular:  Negative for chest pain and palpitations.   Gastrointestinal:  Negative for abdominal pain and vomiting.   Genitourinary:  Negative for dysuria and hematuria.   Musculoskeletal:  Negative for arthralgias and back pain.   Skin:  Negative for color change and rash.   Neurological:  Negative for seizures and syncope.   All other systems reviewed and are negative.      Physical Exam  Physical Exam  Vitals and nursing note reviewed.   Constitutional:       General: He is not in acute distress.  HENT:      Head: Normocephalic and atraumatic.   Eyes:      General: No scleral icterus.     Conjunctiva/sclera: Conjunctivae normal.      Pupils: Pupils are equal, round, and reactive to light.   Cardiovascular:      Rate and Rhythm: Normal rate and regular rhythm.      Pulses: Normal pulses.   Pulmonary:      Effort: Pulmonary effort is normal. No respiratory distress.   Abdominal:      Tenderness: There is no abdominal tenderness.   Musculoskeletal:         General: Normal range of motion.      Cervical back: Normal range of motion.   Skin:      General: Skin is warm and dry.      Capillary Refill: Capillary refill takes less than 2 seconds.      Coloration: Skin is not jaundiced.   Neurological:      Mental Status: He is alert and oriented to person, place, and time.         Vital Signs  ED Triage Vitals [08/27/24 1905]   Temperature Pulse Respirations Blood Pressure SpO2   99.1 °F (37.3 °C) 85 18 117/68 94 %      Temp Source Heart Rate Source Patient Position - Orthostatic VS BP Location FiO2 (%)   Oral Monitor Lying Left arm --      Pain Score       --           Vitals:    08/27/24 1905   BP: 117/68   Pulse: 85   Patient Position - Orthostatic VS: Lying         Visual Acuity      ED Medications  Medications - No data to display    Diagnostic Studies  Results Reviewed       None                   No orders to display              Procedures  Procedures         ED Course  ED Course as of 08/29/24 1333   Tue Aug 27, 2024   1912 Discussed with toxicology, no acute concerns with this medication as it has naloxone in it.                                 SBIRT 20yo+      Flowsheet Row Most Recent Value   Initial Alcohol Screen: US AUDIT-C     1. How often do you have a drink containing alcohol? 0 Filed at: 08/27/2024 1905   2. How many drinks containing alcohol do you have on a typical day you are drinking?  0 Filed at: 08/27/2024 1905   3a. Male UNDER 65: How often do you have five or more drinks on one occasion? 0 Filed at: 08/27/2024 1905   Audit-C Score 0 Filed at: 08/27/2024 1905                      Medical Decision Making  38-year-old male presents today after injecting Suboxone.  Discussed with toxicology, will monitor for an hour.  Patient denying any symptoms and would like to go home.  After an hour, patient states no changes in that he would like to go home.  Patient informed not to inject Suboxone anymore.  ------------------------------------------------------------  Strict return precautions discussed. Patient at time of discharge well-appearing  "in no acute distress, all questions answered. Patient agreeable to plan.  Patient's vitals, lab/imaging results, diagnosis, and treatment plan were discussed with the patient. All new/changed medications were discussed with patient, specifically, route of administration, how often and when to take, and where they can be picked up. Strict return precautions as well as close follow up with PCP was discussed with the patient and the patient was agreeable to my recommendations.  Patient verbally acknowledged understanding of the above communications. All labs reviewed and utilized in the medical decision making process (if labs were ordered). Portions of the record may have been created with voice recognition software.  Occasional wrong word or \"sound a like\" substitutions may have occurred due to the inherent limitations of voice recognition software.  Read the chart carefully and recognize, using context, where substitutions have occurred.                   Disposition  Final diagnoses:   Drug abuse (HCC)     Time reflects when diagnosis was documented in both MDM as applicable and the Disposition within this note       Time User Action Codes Description Comment    8/27/2024  7:13 PM Alfredo Velazquez Add [F19.10] Drug abuse (HCC)           ED Disposition       ED Disposition   Discharge    Condition   Stable    Date/Time   Tue Aug 27, 2024 1913    Comment   Kapil Cordova discharge to home/self care.                   Follow-up Information       Follow up With Specialties Details Why Contact Info Additional Information    [facility] Emergency Medicine Go to  If symptoms worsen [address] [facility] [address] [phone] [facility]    Reese Gomes DO Family Medicine Schedule an appointment as soon as possible for a visit  As needed [facility] [facility] [address] [phone]               Discharge " Medication List as of 8/27/2024  7:16 PM        CONTINUE these medications which have NOT CHANGED    Details   ALPRAZolam (XANAX) 2 MG tablet Take 1 tablet (2 mg total) by mouth 3 (three) times a day as needed for anxiety, Starting Mon 8/26/2024, Until Sun 11/24/2024 at 2359, Normal      amphetamine-dextroamphetamine (ADDERALL) 20 mg tablet Take 1 tablet (20 mg total) by mouth 2 (two) times a day Max Daily Amount: 40 mg, Starting Mon 8/26/2024, Until Wed 9/25/2024, Normal      B Complex-C (B-complex with vitamin C) tablet Take 1 tablet by mouth daily, Starting Mon 8/26/2024, Normal      buprenorphine (SUBUTEX) 8 mg 1 TABLET UNDER THE TONGUE AND ALLOW TO DISSOLVE DAILY 2 TIMES PER 30, Historical Med      buprenorphine-naloxone (Suboxone) 8-2 mg PLACE 2 FILMS UNDER TONGUE EVERY MORNING, Historical Med      Cholecalciferol (Vitamin D3) 1000 units CAPS Take 1 capsule by mouth daily, Starting Thu 6/27/2024, Until Tue 12/24/2024, Normal      cloNIDine (CATAPRES) 0.1 mg tablet Take 1 tablet (0.1 mg total) by mouth 3 (three) times a day as needed (anxiety), Starting Mon 8/26/2024, Normal      naloxone (NARCAN) 4 mg/0.1 mL nasal spray Administer 1 spray into a nostril. If no response after 2-3 minutes, give another dose in the other nostril using a new spray., Normal             No discharge procedures on file.    PDMP Review         Value Time User    PDMP Reviewed  Yes 8/26/2024  8:35 AM Reese Gomes DO            ED Provider  Electronically Signed by             Alfredo Velazquez PA-C  08/29/24 4769

## 2024-09-18 DIAGNOSIS — F41.9 ANXIETY: ICD-10-CM

## 2024-09-18 RX ORDER — CLONIDINE HYDROCHLORIDE 0.1 MG/1
TABLET ORAL
Qty: 270 TABLET | Refills: 1 | Status: SHIPPED | OUTPATIENT
Start: 2024-09-18

## 2024-09-26 ENCOUNTER — OFFICE VISIT (OUTPATIENT)
Age: 39
End: 2024-09-26
Payer: COMMERCIAL

## 2024-09-26 VITALS
BODY MASS INDEX: 21.76 KG/M2 | HEART RATE: 94 BPM | TEMPERATURE: 98.1 F | OXYGEN SATURATION: 100 % | DIASTOLIC BLOOD PRESSURE: 64 MMHG | WEIGHT: 152 LBS | SYSTOLIC BLOOD PRESSURE: 118 MMHG | HEIGHT: 70 IN | RESPIRATION RATE: 16 BRPM

## 2024-09-26 DIAGNOSIS — F90.0 ATTENTION DEFICIT HYPERACTIVITY DISORDER (ADHD), PREDOMINANTLY INATTENTIVE TYPE: ICD-10-CM

## 2024-09-26 DIAGNOSIS — F41.9 ANXIETY: ICD-10-CM

## 2024-09-26 DIAGNOSIS — F19.11 SUBSTANCE ABUSE IN REMISSION (HCC): Primary | ICD-10-CM

## 2024-09-26 PROCEDURE — 99214 OFFICE O/P EST MOD 30 MIN: CPT | Performed by: FAMILY MEDICINE

## 2024-09-26 RX ORDER — DEXTROAMPHETAMINE SACCHARATE, AMPHETAMINE ASPARTATE, DEXTROAMPHETAMINE SULFATE AND AMPHETAMINE SULFATE 5; 5; 5; 5 MG/1; MG/1; MG/1; MG/1
1 TABLET ORAL 2 TIMES DAILY
Qty: 60 TABLET | Refills: 0 | Status: SHIPPED | OUTPATIENT
Start: 2024-09-26 | End: 2024-10-26

## 2024-09-26 RX ORDER — ALPRAZOLAM 2 MG
2 TABLET ORAL 3 TIMES DAILY PRN
Qty: 90 TABLET | Refills: 2 | Status: SHIPPED | OUTPATIENT
Start: 2024-09-26 | End: 2024-12-25

## 2024-09-26 NOTE — ASSESSMENT & PLAN NOTE
Orders:    amphetamine-dextroamphetamine (ADDERALL) 20 mg tablet; Take 1 tablet (20 mg total) by mouth 2 (two) times a day Max Daily Amount: 40 mg

## 2024-09-26 NOTE — ASSESSMENT & PLAN NOTE
Orders:    ALPRAZolam (XANAX) 2 MG tablet; Take 1 tablet (2 mg total) by mouth 3 (three) times a day as needed for anxiety

## 2024-09-26 NOTE — PROGRESS NOTES
"Ambulatory Visit  Name: Kapil Cordova      : 1985      MRN: [id number]  Encounter Provider: Reese Gomes DO  Encounter Date: 2024   Encounter department: [facility]    Assessment & Plan  Anxiety    Orders:    ALPRAZolam (XANAX) 2 MG tablet; Take 1 tablet (2 mg total) by mouth 3 (three) times a day as needed for anxiety    Attention deficit hyperactivity disorder (ADHD), predominantly inattentive type    Orders:    amphetamine-dextroamphetamine (ADDERALL) 20 mg tablet; Take 1 tablet (20 mg total) by mouth 2 (two) times a day Max Daily Amount: 40 mg    Substance abuse in remission (HCC)  Continue to follow-up with Dr. Elizabeth.        He will have his lab work done.  will return here in 3 months.  History of Present Illness     Kapil presents today for medication refills.  He says he is doing well.  He is using the Adderall as prescribed alprazolam on as-needed basis.          Review of Systems   Constitutional: Negative.    HENT: Negative.     Eyes: Negative.    Respiratory: Negative.     Cardiovascular: Negative.    Gastrointestinal: Negative.    Endocrine: Negative.    Genitourinary: Negative.    Musculoskeletal: Negative.    Skin: Negative.    Allergic/Immunologic: Negative.    Neurological: Negative.    Hematological: Negative.    Psychiatric/Behavioral: Negative.     All other systems reviewed and are negative.          Objective     /64 (BP Location: Left arm, Patient Position: Sitting, Cuff Size: Adult)   Pulse 94   Temp 98.1 °F (36.7 °C) (Tympanic)   Resp 16   Ht 5' 10\" (1.778 m)   Wt 68.9 kg (152 lb)   SpO2 100%   BMI 21.81 kg/m²     Physical Exam  Vitals and nursing note reviewed.   Constitutional:       Appearance: Normal appearance. He is well-developed.   HENT:      Head: Normocephalic.      Nose: Nose normal.   Eyes:      Conjunctiva/sclera: Conjunctivae normal.      Pupils: Pupils are equal, round, and reactive to light.   Cardiovascular:      Rate and " Rhythm: Normal rate and regular rhythm.      Pulses: Normal pulses.      Heart sounds: Normal heart sounds.   Pulmonary:      Effort: Pulmonary effort is normal.      Breath sounds: Normal breath sounds.   Abdominal:      General: Abdomen is flat. Bowel sounds are normal.      Palpations: Abdomen is soft.   Musculoskeletal:         General: Normal range of motion.      Cervical back: Normal range of motion and neck supple.   Skin:     General: Skin is warm and dry.   Neurological:      General: No focal deficit present.      Mental Status: He is alert and oriented to person, place, and time.   Psychiatric:         Mood and Affect: Mood normal.         Behavior: Behavior normal.         Thought Content: Thought content normal.         Judgment: Judgment normal.

## 2024-12-27 DIAGNOSIS — F41.9 ANXIETY: ICD-10-CM

## 2024-12-27 DIAGNOSIS — F90.0 ATTENTION DEFICIT HYPERACTIVITY DISORDER (ADHD), PREDOMINANTLY INATTENTIVE TYPE: ICD-10-CM

## 2024-12-27 NOTE — TELEPHONE ENCOUNTER
Reason for call:   [x] Refill   [] Prior Auth  [] Other:     Office:   [x] PCP/Provider - Reese Gomes DO / [facility]   [] Specialty/Provider -     Medication: amphetamine-dextroamphetamine (ADDERALL) 20 mg tablet / Take 1 tablet (20 mg total) by mouth 2 (two) times a day / Qty 90    ALPRAZolam (XANAX) 2 MG tablet/ Take 1 tablet (2 mg total) by mouth 3 (three) times a day as needed for anxiety, / 90      Pharmacy:  [facility]/pharmacy #[id number] - [place] - [address]     Does the patient have enough for 3 days?   [] Yes   [x] No - Send as HP to POD

## 2024-12-27 NOTE — TELEPHONE ENCOUNTER
Patient called to check the status of his refills. Informed patient they were in pending status, waiting on approval and they were sent as HP.  Patient verbalized understanding.

## 2024-12-28 RX ORDER — DEXTROAMPHETAMINE SACCHARATE, AMPHETAMINE ASPARTATE, DEXTROAMPHETAMINE SULFATE AND AMPHETAMINE SULFATE 5; 5; 5; 5 MG/1; MG/1; MG/1; MG/1
1 TABLET ORAL 2 TIMES DAILY
Qty: 60 TABLET | Refills: 0 | OUTPATIENT
Start: 2024-12-28 | End: 2025-01-27

## 2024-12-28 RX ORDER — ALPRAZOLAM 2 MG/1
2 TABLET ORAL 3 TIMES DAILY PRN
Qty: 90 TABLET | Refills: 2 | OUTPATIENT
Start: 2024-12-28 | End: 2025-03-28

## 2024-12-28 NOTE — TELEPHONE ENCOUNTER
"Pt stated, \"Did the office send my medication?\"    Pt made aware controlled substances are not refilled after hours.     Please call pt when office reopens   "

## 2024-12-28 NOTE — TELEPHONE ENCOUNTER
"Pt stated, \" I would like to know if my medication was refilled.     Pt made aware that a note was left in his chart by his provider that he is over due for an appointment and his medications were not refilled.    Pt was also made aware controlled substances can not be refilled after hours.     Please follow up with pt, thank you.   "

## 2024-12-30 ENCOUNTER — OFFICE VISIT (OUTPATIENT)
Age: 39
End: 2024-12-30
Payer: COMMERCIAL

## 2024-12-30 VITALS
DIASTOLIC BLOOD PRESSURE: 60 MMHG | OXYGEN SATURATION: 98 % | TEMPERATURE: 98.1 F | BODY MASS INDEX: 21.38 KG/M2 | WEIGHT: 149 LBS | HEART RATE: 104 BPM | SYSTOLIC BLOOD PRESSURE: 90 MMHG

## 2024-12-30 DIAGNOSIS — F41.9 ANXIETY: ICD-10-CM

## 2024-12-30 DIAGNOSIS — Z11.59 NEED FOR HEPATITIS C SCREENING TEST: ICD-10-CM

## 2024-12-30 DIAGNOSIS — F90.0 ATTENTION DEFICIT HYPERACTIVITY DISORDER (ADHD), PREDOMINANTLY INATTENTIVE TYPE: Primary | ICD-10-CM

## 2024-12-30 DIAGNOSIS — Z11.4 SCREENING FOR HIV (HUMAN IMMUNODEFICIENCY VIRUS): ICD-10-CM

## 2024-12-30 PROCEDURE — 99214 OFFICE O/P EST MOD 30 MIN: CPT | Performed by: FAMILY MEDICINE

## 2024-12-30 RX ORDER — CLONAZEPAM 2 MG/1
2 TABLET ORAL 3 TIMES DAILY
Qty: 90 TABLET | Refills: 2 | Status: SHIPPED | OUTPATIENT
Start: 2024-12-30

## 2024-12-30 RX ORDER — DEXTROAMPHETAMINE SACCHARATE, AMPHETAMINE ASPARTATE, DEXTROAMPHETAMINE SULFATE AND AMPHETAMINE SULFATE 5; 5; 5; 5 MG/1; MG/1; MG/1; MG/1
1 TABLET ORAL 2 TIMES DAILY
Qty: 60 TABLET | Refills: 0 | Status: SHIPPED | OUTPATIENT
Start: 2024-12-30 | End: 2025-01-29

## 2024-12-30 NOTE — ASSESSMENT & PLAN NOTE
He requested the alprazolam be changed to clonazepam.  I made that change.  Will follow-up in 3 months sooner if needed.  Orders:  •  clonazePAM (KlonoPIN) 2 mg tablet; Take 1 tablet (2 mg total) by mouth 3 (three) times a day

## 2024-12-30 NOTE — PROGRESS NOTES
Name: Kapil Cordova      : 1985      MRN: [id number]  Encounter Provider: Reese Gomes DO  Encounter Date: 2024   Encounter department: [facility] PRIMARY [facility]ARE  :  Assessment & Plan  Need for hepatitis C screening test    Orders:  •  Hepatitis C Antibody; Future  Labs before next visit.  Screening for HIV (human immunodeficiency virus)    Orders:  •  HIV 1/2 AG/AB w Reflex [facility] for 2 yr old and above; Future    Anxiety  He requested the alprazolam be changed to clonazepam.  I made that change.  Will follow-up in 3 months sooner if needed.  Orders:  •  clonazePAM (KlonoPIN) 2 mg tablet; Take 1 tablet (2 mg total) by mouth 3 (three) times a day    Attention deficit hyperactivity disorder (ADHD), predominantly inattentive type    Orders:  •  amphetamine-dextroamphetamine (ADDERALL) 20 mg tablet; Take 1 tablet (20 mg total) by mouth 2 (two) times a day Max Daily Amount: 40 mg           History of Present Illness     Patient presents with:  medication alexis: Pt is here for medication refill    His father committed suicide.      Review of Systems   Constitutional: Negative.    HENT: Negative.     Eyes: Negative.    Respiratory: Negative.     Cardiovascular: Negative.    Gastrointestinal: Negative.    Endocrine: Negative.    Genitourinary: Negative.    Musculoskeletal: Negative.    Skin: Negative.    Allergic/Immunologic: Negative.    Neurological: Negative.    Hematological: Negative.    Psychiatric/Behavioral:  Positive for dysphoric mood. The patient is nervous/anxious.    All other systems reviewed and are negative.      Objective   BP 90/60 (BP Location: Left arm, Patient Position: Sitting, Cuff Size: Standard)   Pulse 104   Temp 98.1 °F (36.7 °C)   Wt 67.6 kg (149 lb)   SpO2 98%   BMI 21.38 kg/m²      Physical Exam  Vitals and nursing note reviewed.   Constitutional:       Appearance: He is well-developed.   HENT:      Head: Normocephalic and atraumatic.   Eyes:      Pupils: Pupils  are equal, round, and reactive to light.   Cardiovascular:      Rate and Rhythm: Normal rate.   Pulmonary:      Effort: Pulmonary effort is normal.   Abdominal:      Palpations: Abdomen is soft.   Musculoskeletal:         General: Normal range of motion.      Cervical back: Normal range of motion and neck supple.   Lymphadenopathy:      Cervical: No cervical adenopathy.   Skin:     General: Skin is warm.   Neurological:      Mental Status: He is alert and oriented to person, place, and time.   Psychiatric:         Behavior: Behavior normal.

## 2024-12-30 NOTE — TELEPHONE ENCOUNTER
Tried calling pt multiple times and his number is out of service. [employer] message was sent to pt, informing him that he needs an appt as per Dr. Gomes. Medication was refused due to no appt in the past 3 months.

## 2024-12-30 NOTE — ASSESSMENT & PLAN NOTE
Orders:  •  amphetamine-dextroamphetamine (ADDERALL) 20 mg tablet; Take 1 tablet (20 mg total) by mouth 2 (two) times a day Max Daily Amount: 40 mg

## 2025-03-13 ENCOUNTER — APPOINTMENT (EMERGENCY)
Dept: RADIOLOGY | Facility: HOSPITAL | Age: 40
End: 2025-03-13
Payer: COMMERCIAL

## 2025-03-13 ENCOUNTER — HOSPITAL ENCOUNTER (EMERGENCY)
Facility: HOSPITAL | Age: 40
End: 2025-03-13
Attending: EMERGENCY MEDICINE
Payer: COMMERCIAL

## 2025-03-13 ENCOUNTER — APPOINTMENT (EMERGENCY)
Dept: CT IMAGING | Facility: HOSPITAL | Age: 40
End: 2025-03-13
Payer: COMMERCIAL

## 2025-03-13 ENCOUNTER — HOSPITAL ENCOUNTER (INPATIENT)
Facility: HOSPITAL | Age: 40
LOS: 2 days | Discharge: HOME | DRG: 812 | End: 2025-03-15
Attending: STUDENT IN AN ORGANIZED HEALTH CARE EDUCATION/TRAINING PROGRAM | Admitting: STUDENT IN AN ORGANIZED HEALTH CARE EDUCATION/TRAINING PROGRAM
Payer: COMMERCIAL

## 2025-03-13 ENCOUNTER — OFFICE VISIT (OUTPATIENT)
Age: 40
End: 2025-03-13
Payer: COMMERCIAL

## 2025-03-13 VITALS
HEIGHT: 70 IN | TEMPERATURE: 96.1 F | BODY MASS INDEX: 22.19 KG/M2 | OXYGEN SATURATION: 97 % | SYSTOLIC BLOOD PRESSURE: 110 MMHG | HEART RATE: 116 BPM | WEIGHT: 155 LBS | RESPIRATION RATE: 16 BRPM | DIASTOLIC BLOOD PRESSURE: 58 MMHG

## 2025-03-13 VITALS
WEIGHT: 164.68 LBS | HEART RATE: 57 BPM | DIASTOLIC BLOOD PRESSURE: 74 MMHG | OXYGEN SATURATION: 100 % | TEMPERATURE: 97.7 F | BODY MASS INDEX: 23.63 KG/M2 | SYSTOLIC BLOOD PRESSURE: 113 MMHG | RESPIRATION RATE: 12 BRPM

## 2025-03-13 DIAGNOSIS — T50.901A OVERDOSE: Primary | ICD-10-CM

## 2025-03-13 DIAGNOSIS — F19.10 POLYSUBSTANCE ABUSE (HCC): ICD-10-CM

## 2025-03-13 DIAGNOSIS — J96.02 ACUTE RESPIRATORY FAILURE WITH HYPOXIA AND HYPERCAPNIA (HCC): ICD-10-CM

## 2025-03-13 DIAGNOSIS — T50.901A ACCIDENTAL OVERDOSE: Primary | ICD-10-CM

## 2025-03-13 DIAGNOSIS — F41.9 ANXIETY: ICD-10-CM

## 2025-03-13 DIAGNOSIS — F19.11 SUBSTANCE ABUSE IN REMISSION (HCC): ICD-10-CM

## 2025-03-13 DIAGNOSIS — B18.2 CHRONIC HEPATITIS C WITHOUT HEPATIC COMA (HCC): ICD-10-CM

## 2025-03-13 DIAGNOSIS — F19.10 POLYSUBSTANCE ABUSE (HCC): Primary | ICD-10-CM

## 2025-03-13 DIAGNOSIS — R06.81 APNEA: ICD-10-CM

## 2025-03-13 DIAGNOSIS — F90.0 ATTENTION DEFICIT HYPERACTIVITY DISORDER (ADHD), PREDOMINANTLY INATTENTIVE TYPE: ICD-10-CM

## 2025-03-13 DIAGNOSIS — J96.01 ACUTE RESPIRATORY FAILURE WITH HYPOXIA AND HYPERCAPNIA (HCC): ICD-10-CM

## 2025-03-13 PROBLEM — T50.902A INTENTIONAL OVERDOSE (HCC): Status: ACTIVE | Noted: 2025-03-13

## 2025-03-13 PROBLEM — R74.8 ELEVATED CK: Status: ACTIVE | Noted: 2025-03-13

## 2025-03-13 PROBLEM — Z86.59 HISTORY OF ADHD: Status: ACTIVE | Noted: 2020-05-17

## 2025-03-13 PROBLEM — R94.31 PROLONGED QT INTERVAL: Status: ACTIVE | Noted: 2024-04-06

## 2025-03-13 PROBLEM — E83.52 HYPERCALCEMIA: Status: ACTIVE | Noted: 2024-04-06

## 2025-03-13 PROBLEM — B19.20 HEPATITIS C: Status: ACTIVE | Noted: 2020-01-29

## 2025-03-13 LAB
ALBUMIN SERPL BCG-MCNC: 3.8 G/DL (ref 3.5–5)
ALP SERPL-CCNC: 42 U/L (ref 34–104)
ALT SERPL W P-5'-P-CCNC: 20 U/L (ref 7–52)
AMPHETAMINES SERPL QL SCN: POSITIVE
ANION GAP SERPL CALCULATED.3IONS-SCNC: 5 MMOL/L (ref 4–13)
APAP SERPL-MCNC: <2 UG/ML (ref 10–20)
APTT PPP: 38 SECONDS (ref 23–34)
AST SERPL W P-5'-P-CCNC: 24 U/L (ref 13–39)
BARBITURATES UR QL: POSITIVE
BASE EX.OXY STD BLDV CALC-SCNC: 70.8 % (ref 60–80)
BASE EXCESS BLDV CALC-SCNC: -2.4 MMOL/L
BASOPHILS # BLD AUTO: 0.04 THOUSANDS/ÂΜL (ref 0–0.1)
BASOPHILS NFR BLD AUTO: 1 % (ref 0–1)
BENZODIAZ UR QL: POSITIVE
BILIRUB SERPL-MCNC: 0.73 MG/DL (ref 0.2–1)
BUN SERPL-MCNC: 12 MG/DL (ref 5–25)
CALCIUM SERPL-MCNC: 8.6 MG/DL (ref 8.4–10.2)
CHLORIDE SERPL-SCNC: 104 MMOL/L (ref 96–108)
CK SERPL-CCNC: 409 U/L (ref 39–308)
CO2 SERPL-SCNC: 30 MMOL/L (ref 21–32)
COCAINE UR QL: NEGATIVE
CREAT SERPL-MCNC: 0.8 MG/DL (ref 0.6–1.3)
EOSINOPHIL # BLD AUTO: 0.21 THOUSAND/ÂΜL (ref 0–0.61)
EOSINOPHIL NFR BLD AUTO: 3 % (ref 0–6)
ERYTHROCYTE [DISTWIDTH] IN BLOOD BY AUTOMATED COUNT: 11.9 % (ref 11.6–15.1)
ETHANOL SERPL-MCNC: <10 MG/DL
FENTANYL UR QL SCN: POSITIVE
GFR SERPL CREATININE-BSD FRML MDRD: 114 ML/MIN/1.73SQ M
GLUCOSE SERPL-MCNC: 105 MG/DL (ref 65–140)
GLUCOSE SERPL-MCNC: 113 MG/DL (ref 65–140)
HCO3 BLDV-SCNC: 24.3 MMOL/L (ref 24–30)
HCT VFR BLD AUTO: 33.8 % (ref 36.5–49.3)
HGB BLD-MCNC: 11.1 G/DL (ref 12–17)
HYDROCODONE UR QL SCN: NEGATIVE
IMM GRANULOCYTES # BLD AUTO: 0.02 THOUSAND/UL (ref 0–0.2)
IMM GRANULOCYTES NFR BLD AUTO: 0 % (ref 0–2)
INR PPP: 1.22 (ref 0.85–1.19)
LACTATE SERPL-SCNC: 0.9 MMOL/L (ref 0.5–2)
LYMPHOCYTES # BLD AUTO: 2.63 THOUSANDS/ÂΜL (ref 0.6–4.47)
LYMPHOCYTES NFR BLD AUTO: 32 % (ref 14–44)
MCH RBC QN AUTO: 30.4 PG (ref 26.8–34.3)
MCHC RBC AUTO-ENTMCNC: 32.8 G/DL (ref 31.4–37.4)
MCV RBC AUTO: 93 FL (ref 82–98)
METHADONE UR QL: NEGATIVE
MONOCYTES # BLD AUTO: 0.54 THOUSAND/ÂΜL (ref 0.17–1.22)
MONOCYTES NFR BLD AUTO: 7 % (ref 4–12)
NEUTROPHILS # BLD AUTO: 4.88 THOUSANDS/ÂΜL (ref 1.85–7.62)
NEUTS SEG NFR BLD AUTO: 57 % (ref 43–75)
NRBC BLD AUTO-RTO: 0 /100 WBCS
O2 CT BLDV-SCNC: 12 ML/DL
OPIATES UR QL SCN: NEGATIVE
OXYCODONE+OXYMORPHONE UR QL SCN: NEGATIVE
PCO2 BLDV: 50.1 MM HG (ref 42–50)
PCP UR QL: NEGATIVE
PH BLDV: 7.3 [PH] (ref 7.3–7.4)
PLATELET # BLD AUTO: 160 THOUSANDS/UL (ref 149–390)
PMV BLD AUTO: 9.2 FL (ref 8.9–12.7)
PO2 BLDV: 40.9 MM HG (ref 35–45)
POTASSIUM SERPL-SCNC: 3.9 MMOL/L (ref 3.5–5.3)
PROT SERPL-MCNC: 5.9 G/DL (ref 6.4–8.4)
PROTHROMBIN TIME: 15.6 SECONDS (ref 12.3–15)
RBC # BLD AUTO: 3.65 MILLION/UL (ref 3.88–5.62)
SALICYLATES SERPL-MCNC: <5 MG/DL (ref 3–20)
SODIUM SERPL-SCNC: 139 MMOL/L (ref 135–147)
THC UR QL: NEGATIVE
WBC # BLD AUTO: 8.32 THOUSAND/UL (ref 4.31–10.16)

## 2025-03-13 PROCEDURE — 31500 INSERT EMERGENCY AIRWAY: CPT | Performed by: EMERGENCY MEDICINE

## 2025-03-13 PROCEDURE — 85025 COMPLETE CBC W/AUTO DIFF WBC: CPT | Performed by: EMERGENCY MEDICINE

## 2025-03-13 PROCEDURE — 71045 X-RAY EXAM CHEST 1 VIEW: CPT

## 2025-03-13 PROCEDURE — 99285 EMERGENCY DEPT VISIT HI MDM: CPT | Performed by: EMERGENCY MEDICINE

## 2025-03-13 PROCEDURE — 80053 COMPREHEN METABOLIC PANEL: CPT | Performed by: EMERGENCY MEDICINE

## 2025-03-13 PROCEDURE — 85730 THROMBOPLASTIN TIME PARTIAL: CPT | Performed by: EMERGENCY MEDICINE

## 2025-03-13 PROCEDURE — 99223 1ST HOSP IP/OBS HIGH 75: CPT | Performed by: STUDENT IN AN ORGANIZED HEALTH CARE EDUCATION/TRAINING PROGRAM

## 2025-03-13 PROCEDURE — 99285 EMERGENCY DEPT VISIT HI MDM: CPT

## 2025-03-13 PROCEDURE — 82948 REAGENT STRIP/BLOOD GLUCOSE: CPT

## 2025-03-13 PROCEDURE — 36556 INSERT NON-TUNNEL CV CATH: CPT | Performed by: PHYSICIAN ASSISTANT

## 2025-03-13 PROCEDURE — 70450 CT HEAD/BRAIN W/O DYE: CPT

## 2025-03-13 PROCEDURE — 82805 BLOOD GASES W/O2 SATURATION: CPT | Performed by: EMERGENCY MEDICINE

## 2025-03-13 PROCEDURE — 85610 PROTHROMBIN TIME: CPT | Performed by: EMERGENCY MEDICINE

## 2025-03-13 PROCEDURE — 96360 HYDRATION IV INFUSION INIT: CPT

## 2025-03-13 PROCEDURE — 80143 DRUG ASSAY ACETAMINOPHEN: CPT | Performed by: EMERGENCY MEDICINE

## 2025-03-13 PROCEDURE — 80307 DRUG TEST PRSMV CHEM ANLYZR: CPT | Performed by: EMERGENCY MEDICINE

## 2025-03-13 PROCEDURE — 82077 ASSAY SPEC XCP UR&BREATH IA: CPT | Performed by: EMERGENCY MEDICINE

## 2025-03-13 PROCEDURE — 80179 DRUG ASSAY SALICYLATE: CPT | Performed by: EMERGENCY MEDICINE

## 2025-03-13 PROCEDURE — 82550 ASSAY OF CK (CPK): CPT | Performed by: EMERGENCY MEDICINE

## 2025-03-13 PROCEDURE — 99214 OFFICE O/P EST MOD 30 MIN: CPT | Performed by: FAMILY MEDICINE

## 2025-03-13 PROCEDURE — 94760 N-INVAS EAR/PLS OXIMETRY 1: CPT

## 2025-03-13 PROCEDURE — 83605 ASSAY OF LACTIC ACID: CPT | Performed by: EMERGENCY MEDICINE

## 2025-03-13 PROCEDURE — 94002 VENT MGMT INPAT INIT DAY: CPT

## 2025-03-13 PROCEDURE — 36415 COLL VENOUS BLD VENIPUNCTURE: CPT | Performed by: EMERGENCY MEDICINE

## 2025-03-13 RX ORDER — MIDAZOLAM HYDROCHLORIDE 2 MG/2ML
4 INJECTION, SOLUTION INTRAMUSCULAR; INTRAVENOUS ONCE
Status: COMPLETED | OUTPATIENT
Start: 2025-03-13 | End: 2025-03-13

## 2025-03-13 RX ORDER — ETOMIDATE 2 MG/ML
20 INJECTION INTRAVENOUS ONCE
Status: COMPLETED | OUTPATIENT
Start: 2025-03-13 | End: 2025-03-13

## 2025-03-13 RX ORDER — NALOXONE HYDROCHLORIDE 1 MG/ML
INJECTION INTRAMUSCULAR; INTRAVENOUS; SUBCUTANEOUS
Status: COMPLETED
Start: 2025-03-13 | End: 2025-03-13

## 2025-03-13 RX ORDER — NALOXONE HYDROCHLORIDE 0.4 MG/ML
INJECTION, SOLUTION INTRAMUSCULAR; INTRAVENOUS; SUBCUTANEOUS
Status: DISCONTINUED
Start: 2025-03-13 | End: 2025-03-13 | Stop reason: HOSPADM

## 2025-03-13 RX ORDER — SODIUM CHLORIDE 9 MG/ML
100 INJECTION, SOLUTION INTRAVENOUS CONTINUOUS
Status: DISCONTINUED | OUTPATIENT
Start: 2025-03-13 | End: 2025-03-13 | Stop reason: HOSPADM

## 2025-03-13 RX ORDER — CHLORHEXIDINE GLUCONATE ORAL RINSE 1.2 MG/ML
15 SOLUTION DENTAL EVERY 12 HOURS SCHEDULED
Status: DISCONTINUED | OUTPATIENT
Start: 2025-03-13 | End: 2025-03-15 | Stop reason: HOSPADM

## 2025-03-13 RX ORDER — PROMETHAZINE HCL 50 MG
TABLET ORAL
COMMUNITY
Start: 2024-12-17 | End: 2025-03-13

## 2025-03-13 RX ORDER — NALOXONE HYDROCHLORIDE 1 MG/ML
2 INJECTION INTRAMUSCULAR; INTRAVENOUS; SUBCUTANEOUS ONCE
Status: DISCONTINUED | OUTPATIENT
Start: 2025-03-13 | End: 2025-03-13

## 2025-03-13 RX ORDER — NALOXONE HYDROCHLORIDE 1 MG/ML
1 INJECTION PARENTERAL ONCE
Status: COMPLETED | OUTPATIENT
Start: 2025-03-13 | End: 2025-03-13

## 2025-03-13 RX ORDER — SUCCINYLCHOLINE/SOD CL,ISO/PF 100 MG/5ML
100 SYRINGE (ML) INTRAVENOUS ONCE
Status: COMPLETED | OUTPATIENT
Start: 2025-03-13 | End: 2025-03-13

## 2025-03-13 RX ORDER — SODIUM CHLORIDE, SODIUM LACTATE, POTASSIUM CHLORIDE, CALCIUM CHLORIDE 600; 310; 30; 20 MG/100ML; MG/100ML; MG/100ML; MG/100ML
100 INJECTION, SOLUTION INTRAVENOUS CONTINUOUS
Status: DISCONTINUED | OUTPATIENT
Start: 2025-03-13 | End: 2025-03-14

## 2025-03-13 RX ORDER — NALOXONE HYDROCHLORIDE 1 MG/ML
2 INJECTION PARENTERAL ONCE
Status: COMPLETED | OUTPATIENT
Start: 2025-03-13 | End: 2025-03-13

## 2025-03-13 RX ORDER — CHLORHEXIDINE GLUCONATE ORAL RINSE 1.2 MG/ML
15 SOLUTION DENTAL EVERY 12 HOURS SCHEDULED
Status: CANCELLED | OUTPATIENT
Start: 2025-03-13

## 2025-03-13 RX ORDER — ENOXAPARIN SODIUM 100 MG/ML
40 INJECTION SUBCUTANEOUS DAILY
Status: DISCONTINUED | OUTPATIENT
Start: 2025-03-14 | End: 2025-03-15 | Stop reason: HOSPADM

## 2025-03-13 RX ORDER — CLONAZEPAM 2 MG/1
2 TABLET ORAL 3 TIMES DAILY
Qty: 90 TABLET | Refills: 2 | Status: SHIPPED | OUTPATIENT
Start: 2025-03-13

## 2025-03-13 RX ORDER — DEXTROAMPHETAMINE SACCHARATE, AMPHETAMINE ASPARTATE, DEXTROAMPHETAMINE SULFATE AND AMPHETAMINE SULFATE 5; 5; 5; 5 MG/1; MG/1; MG/1; MG/1
1 TABLET ORAL 2 TIMES DAILY
Qty: 60 TABLET | Refills: 0 | Status: SHIPPED | OUTPATIENT
Start: 2025-03-13 | End: 2025-04-12

## 2025-03-13 RX ORDER — PROPOFOL 10 MG/ML
5-50 INJECTION, EMULSION INTRAVENOUS
Status: DISCONTINUED | OUTPATIENT
Start: 2025-03-13 | End: 2025-03-13 | Stop reason: HOSPADM

## 2025-03-13 RX ADMIN — Medication 100 MG: at 20:03

## 2025-03-13 RX ADMIN — ETOMIDATE 20 MG: 2 INJECTION, SOLUTION INTRAVENOUS at 20:04

## 2025-03-13 RX ADMIN — PROPOFOL 10 MCG/KG/MIN: 10 INJECTION, EMULSION INTRAVENOUS at 21:21

## 2025-03-13 RX ADMIN — SODIUM CHLORIDE, SODIUM LACTATE, POTASSIUM CHLORIDE, AND CALCIUM CHLORIDE 100 ML/HR: .6; .31; .03; .02 INJECTION, SOLUTION INTRAVENOUS at 23:19

## 2025-03-13 RX ADMIN — CHLORHEXIDINE GLUCONATE 15 ML: 1.2 SOLUTION ORAL at 23:38

## 2025-03-13 RX ADMIN — NALOXONE HYDROCHLORIDE 2 MG: 1 INJECTION INTRAMUSCULAR; INTRAVENOUS; SUBCUTANEOUS at 19:58

## 2025-03-13 RX ADMIN — SODIUM CHLORIDE 100 ML/HR: 0.9 INJECTION, SOLUTION INTRAVENOUS at 21:45

## 2025-03-13 RX ADMIN — MIDAZOLAM 4 MG: 1 INJECTION INTRAMUSCULAR; INTRAVENOUS at 20:49

## 2025-03-13 NOTE — ED PROVIDER NOTES
Time reflects when diagnosis was documented in both MDM as applicable and the Disposition within this note       Time User Action Codes Description Comment    3/13/2025  9:20 PM Edin Spivey Add [T50.901A] Accidental overdose     3/13/2025  9:20 PM Edin Spivey Add [R06.81] Apnea           ED Disposition       ED Disposition   Transfer to Another Facility-In Network    Condition   --    Date/Time   Thu Mar 13, 2025  9:20 PM    Comment   Kapil Cordova should be transferred out to ICU.               Assessment & Plan       Medical Decision Making  39-year-old male presents the emergency department unresponsive with suspected overdose of opioids  Pinpoint pupils bilaterally  Patient is not responsive to painful stimuli  Patient received a total of 14 mg of Narcan with no changes  Decision made to intubate the patient  Intubation was successful  Possible signs of seizure and with history of known benzodiazepine abuse  Due to the initial presentation with hypotension Central line was placed in the left groin in sterile fashion  Sedation initiated with propofol  Patient was stabilized and was transferred to [facility] for further management and care    Problems Addressed:  Accidental overdose: acute illness or injury  Apnea: acute illness or injury    Amount and/or Complexity of Data Reviewed  Labs: ordered.  Radiology: ordered.    Risk  Prescription drug management.             Medications   naloxone (NARCAN) 0.4 mg/mL injection **ADS Override Pull** (  Return to Cabinet 3/13/25 2042)   propofol (DIPRIVAN) 1000 mg in 100 mL infusion (premix) (10 mcg/kg/min × 74.7 kg Intravenous New Bag 3/13/25 2121)   sodium chloride 0.9 % infusion (100 mL/hr Intravenous New Bag 3/13/25 2145)   naloxone (FOR EMS ONLY) (NARCAN) 2 MG/2ML injection 2 mg (0 mg Does not apply Given to EMS 3/13/25 1958)   naloxone (FOR EMS ONLY) (NARCAN) 2 MG/2ML injection 4 mg (0 mg Does not apply Given to EMS 3/13/25 1957)   naloxone (FOR EMS ONLY)  (NARCAN) 2 MG/2ML injection 2 mg (0 mg Does not apply Given to EMS 3/13/25 1957)   etomidate (AMIDATE) 2 mg/mL injection 20 mg (20 mg Intravenous Given 3/13/25 2004)   Succinylcholine Chloride 100 mg/5 mL syringe 100 mg (100 mg Intravenous Given 3/13/25 2003)   midazolam (VERSED) injection 4 mg (4 mg Intravenous Given 3/13/25 2049)       ED Risk Strat Scores                                                History of Present Illness       Chief Complaint   Patient presents with    Overdose - Accidental     Pt unresponsive, found at a public restroom with needles and paraphernalia. 8 mg total narcan given. Pt still unresponsive upon arrival. Glucose 98 per EMS.        Past Medical History:   Diagnosis Date    Heroin abuse (HCC)       No past surgical history on file.   Family History   Problem Relation Age of Onset    No Known Problems Mother     Completed Suicide  Father       Social History     Tobacco Use    Smoking status: Former     Types: Cigarettes    Smokeless tobacco: Never   Vaping Use    Vaping status: Every Day    Substances: Nicotine   Substance Use Topics    Alcohol use: Yes     Comment: a beer a month    Drug use: Yes     Types: Marijuana      E-Cigarette/Vaping    E-Cigarette Use Current Every Day User       E-Cigarette/Vaping Substances    Nicotine Yes     THC No     CBD No     Flavoring No     Other No     Unknown No       I have reviewed and agree with the history as documented.     HPI  39-year-old male with history of drug abuse including opioid abuse and benzodiazepine abuse presents with overdose.  Patient was found unresponsive in the bathroom of a bar and was brought to the emergency department for evaluation.  At the scene patient was given 8 mg of Narcan with minimal changes.  Patient did arrive breathing spontaneously but had prolonged episodes of apnea intermittently.  Patient 6 mg of additional Narcan via IV with no response and decision was made to intubate the patient.  Unknown downtime.   Patient's pupils are pinpoint bilaterally.  Not responsive to painful stimuli.  There are signs of tongue biting.     Review of Systems   Unable to perform ROS: Patient unresponsive           Objective       ED Triage Vitals   Temperature Pulse Blood Pressure Respirations SpO2 Patient Position - Orthostatic VS   03/13/25 1943 03/13/25 1937 03/13/25 1943 03/13/25 1937 03/13/25 1937 03/13/25 1943   97.7 °F (36.5 °C) 60 126/99 (!) 0 95 % Lying      Temp Source Heart Rate Source BP Location FiO2 (%) Pain Score    03/13/25 1943 03/13/25 1937 03/13/25 1943 -- --    Oral Monitor Left arm        Vitals      Date and Time Temp Pulse SpO2 Resp BP Pain Score FACES Pain Rating User   03/13/25 2200 -- 57 100 % -- 113/74 -- -- [place]   03/13/25 2145 -- 57 100 % -- 116/80 -- --    03/13/25 2140 -- 59 100 % -- 110/81 -- --    03/13/25 2135 -- 58 100 % -- 114/80 -- --    03/13/25 2130 -- 59 100 % -- 114/80 -- --    03/13/25 2125 -- 60 100 % -- 119/94 -- --    03/13/25 2120 -- 60 100 % -- 114/87 -- --    03/13/25 2115 -- 59 100 % -- 114/87 -- --    03/13/25 2110 -- 62 100 % -- 117/85 -- --    03/13/25 2105 -- 61 100 % -- 115/88 -- --    03/13/25 2100 -- 61 100 % -- 115/83 -- --    03/13/25 2055 -- 61 100 % -- 112/80 -- --    03/13/25 2050 -- 59 100 % -- 117/84 -- --    03/13/25 2045 -- 58 100 % -- 119/86 -- --    03/13/25 2040 -- 70 100 % -- 119/91 -- --    03/13/25 2035 -- 81 100 % -- 121/39 -- --    03/13/25 2030 -- 76 100 % -- 128/56 -- --    03/13/25 2025 -- 67 100 % -- 114/88 -- --    03/13/25 2020 -- 60 100 % -- 114/87 -- --    03/13/25 2015 -- 63 100 % -- 116/89 -- --    03/13/25 2010 -- 62 100 % -- 116/88 -- --    03/13/25 2006 -- 57 100 % -- 107/83 -- --    03/13/25 2000 -- 72 98 % -- 120/84 -- --    03/13/25 1943 97.7 °F (36.5 °C) 72 98 % 12 126/99 -- -- NAHID   03/13/25 1937 -- 60 95 % 0 -- -- -- RS            Physical Exam  Vitals and nursing note reviewed.   Constitutional:        Appearance: Normal appearance.      Comments: Unresponsive   HENT:      Head: Normocephalic and atraumatic.      Right Ear: External ear normal.      Left Ear: External ear normal.      Nose: Nose normal.      Mouth/Throat:      Mouth: Mucous membranes are moist.      Pharynx: Oropharynx is clear.   Eyes:      General: No scleral icterus.        Right eye: No discharge.         Left eye: No discharge.      Extraocular Movements: Extraocular movements intact.      Conjunctiva/sclera: Conjunctivae normal.      Pupils: Pupils are equal, round, and reactive to light.   Cardiovascular:      Rate and Rhythm: Normal rate and regular rhythm.      Pulses: Normal pulses.      Heart sounds: Normal heart sounds.   Pulmonary:      Effort: Pulmonary effort is normal.      Breath sounds: Normal breath sounds.   Abdominal:      General: Abdomen is flat. Bowel sounds are normal. There is no distension.      Palpations: Abdomen is soft.      Tenderness: There is no abdominal tenderness. There is no guarding or rebound.   Musculoskeletal:         General: Normal range of motion.      Cervical back: Normal range of motion and neck supple.   Skin:     General: Skin is warm and dry.      Capillary Refill: Capillary refill takes less than 2 seconds.         Results Reviewed       Procedure Component Value Units Date/Time    Lactic acid, plasma (w/reflex if result > 2.0) [[id number]]  (Normal) Collected: 03/13/25 2134    Lab Status: Final result Specimen: Blood from Central Venous Line Updated: 03/13/25 2207     LACTIC ACID 0.9 mmol/L     Narrative:      Result may be elevated if tourniquet was used during collection.    Protime-INR [[id number]]  (Abnormal) Collected: 03/13/25 2134    Lab Status: Final result Specimen: Blood from Central Venous Line Updated: 03/13/25 2207     Protime 15.6 seconds      INR 1.22    Narrative:      INR Therapeutic Range    Indication                                             INR Range      Atrial Fibrillation                                                2.0-3.0  Hypercoagulable State                                    2.0.2.3  Left Ventricular Asist Device                            2.0-3.0  Mechanical Heart Valve                                  -    Aortic(with afib, MI, embolism, HF, LA enlargement,    and/or coagulopathy)                                     2.0-3.0 (2.5-3.5)     Mitral                                                             2.5-3.5  Prosthetic/Bioprosthetic Heart Valve               2.0-3.0  Venous thromboembolism (VTE: VT, PE        2.0-3.0    APTT [[id number]]  (Abnormal) Collected: 03/13/25 2134    Lab Status: Final result Specimen: Blood from Central Venous Line Updated: 03/13/25 2207     PTT 38 seconds     Comprehensive metabolic panel [[id number]]  (Abnormal) Collected: 03/13/25 2134    Lab Status: Final result Specimen: Blood from Central Venous Line Updated: 03/13/25 2205     Sodium 139 mmol/L      Potassium 3.9 mmol/L      Chloride 104 mmol/L      CO2 30 mmol/L      ANION GAP 5 mmol/L      BUN 12 mg/dL      Creatinine 0.80 mg/dL      Glucose 113 mg/dL      Calcium 8.6 mg/dL      AST 24 U/L      ALT 20 U/L      Alkaline Phosphatase 42 U/L      Total Protein 5.9 g/dL      Albumin 3.8 g/dL      Total Bilirubin 0.73 mg/dL      eGFR 114 ml/min/1.73sq m     Narrative:      National Kid[employer] Foundation guidelines for Chronic Kidney Disease (CKD):     Stage 1 with normal or high GFR (GFR > 90 mL/min/1.73 square meters)    Stage 2 Mild CKD (GFR = 60-89 mL/min/1.73 square meters)    Stage 3A Moderate CKD (GFR = 45-59 mL/min/1.73 square meters)    Stage 3B Moderate CKD (GFR = 30-44 mL/min/1.73 square meters)    Stage 4 Severe CKD (GFR = 15-29 mL/min/1.73 square meters)    Stage 5 End Stage CKD (GFR <15 mL/min/1.73 square meters)  Note: GFR calculation is accurate only with a steady state creatinine    CK [[id number]]  (Abnormal) Collected: 03/13/25 2134    Lab Status: Final result Specimen:  Blood from Central Venous Line Updated: 03/13/25 2205     Total  U/L     Salicylate level [[id number]]  (Normal) Collected: 03/13/25 2134    Lab Status: Final result Specimen: Blood from Central Venous Line Updated: 03/13/25 2205     Salicylate Lvl <5 mg/dL     Acetaminophen level-If concentration is detectable, please discuss with medical  on call. [[id number]]  (Abnormal) Collected: 03/13/25 2134    Lab Status: Final result Specimen: Blood from Central Venous Line Updated: 03/13/25 2205     Acetaminophen Level <2 ug/mL     Rapid drug screen, urine [[id number]]  (Abnormal) Collected: 03/13/25 2134    Lab Status: Final result Specimen: Urine, Catheter Updated: 03/13/25 2204     Amph/Meth UR Positive     Barbiturate Ur Positive     Benzodiazepine Urine Positive     Cocaine Urine Negative     Methadone Urine Negative     Opiate Urine Negative     PCP Ur Negative     THC Urine Negative     Oxycodone Urine Negative     Fentanyl Urine Positive     HYDROCODONE URINE Negative    Narrative:      Presumptive report. If requested, specimen will be sent to reference lab for confirmation.  FOR MEDICAL PURPOSES ONLY.   IF CONFIRMATION NEEDED PLEASE CONTACT THE LAB WITHIN 5 DAYS.    Drug Screen Cutoff Levels:  AMPHETAMINE/METHAMPHETAMINES  1000 ng/mL  BARBITURATES     200 ng/mL  BENZODIAZEPINES     200 ng/mL  COCAINE      300 ng/mL  METHADONE      300 ng/mL  OPIATES      300 ng/mL  PHENCYCLIDINE     25 ng/mL  THC       50 ng/mL  OXYCODONE      100 ng/mL  FENTANYL      5 ng/mL  HYDROCODONE     300 ng/mL    Ethanol [[id number]]  (Normal) Collected: 03/13/25 2134    Lab Status: Final result Specimen: Blood from Central Venous Line Updated: 03/13/25 2203     Ethanol Lvl <10 mg/dL     Blood gas, venous [[id number]]  (Abnormal) Collected: 03/13/25 2134    Lab Status: Final result Specimen: Blood from Central Venous Line Updated: 03/13/25 2147     pH, Gt 7.304     pCO2, Gt 50.1 mm Hg      pO2, Gt 40.9 mm Hg      HCO3,  Gt 24.3 mmol/L      Base Excess, Gt -2.4 mmol/L      O2 Content, Gt 12.0 ml/dL      O2 HGB, VENOUS 70.8 %     CBC and differential [[id number]]  (Abnormal) Collected: 03/13/25 2134    Lab Status: Final result Specimen: Blood from Central Venous Line Updated: 03/13/25 2145     WBC 8.32 Thousand/uL      RBC 3.65 Million/uL      Hemoglobin 11.1 g/dL      Hematocrit 33.8 %      MCV 93 fL      MCH 30.4 pg      MCHC 32.8 g/dL      RDW 11.9 %      MPV 9.2 fL      Platelets 160 Thousands/uL      nRBC 0 /100 WBCs      Segmented % 57 %      Immature Grans % 0 %      Lymphocytes % 32 %      Monocytes % 7 %      Eosinophils Relative 3 %      Basophils Relative 1 %      Absolute Neutrophils 4.88 Thousands/µL      Absolute Immature Grans 0.02 Thousand/uL      Absolute Lymphocytes 2.63 Thousands/µL      Absolute Monocytes 0.54 Thousand/µL      Eosinophils Absolute 0.21 Thousand/µL      Basophils Absolute 0.04 Thousands/µL     Fingerstick Glucose (POCT) [[id number]]  (Normal) Collected: 03/13/25 1940    Lab Status: Final result Specimen: Blood Updated: 03/13/25 1941     POC Glucose 105 mg/dl             CT head without contrast   Final Interpretation by Juvencio Dubose MD (03/13 2058)      No acute intracranial abnormality.      The study was marked in EPIC for immediate notification.                  Workstation performed: [id number]         XR chest 1 view portable    (Results Pending)   XR chest 1 view portable    (Results Pending)       Intubation    Date/Time: 3/13/2025 8:42 PM    Performed by: Edin Spivey MD  Authorized by: Edin Spivey MD    Patient location:  ED  Consent:     Consent obtained:  Emergent situation  Universal protocol:     Patient identity confirmed:  Hospital-assigned identification number  Pre-procedure details:     Patient status:  Unresponsive    Pretreatment medications:  Etomidate    Paralytics:  Succinylcholine  Indications:     Indications for intubation: respiratory failure and airway protection     Procedure details:     Preoxygenation:  Bag valve mask    Intubation method:  Oral    Oral intubation technique:  Glidescope    Laryngoscope blade:  Mac 4    Tube size (mm):  8.0    Tube type:  Cuffed    Number of attempts:  1    Ventilation between attempts: no      Cricoid pressure: yes      Tube visualized through cords: yes    Placement assessment:     ETT to lip:  26    Tube secured with:  ETT archer    Breath sounds:  Equal    Placement verification: CXR verification      CXR findings:  ETT in proper place  Post-procedure details:     Patient tolerance of procedure:  Tolerated well, no immediate complications      ED Medication and Procedure Management   Prior to Admission Medications   Prescriptions Last Dose Informant Patient Reported? Taking?   Cholecalciferol (Vitamin D3) 1000 units CAPS  Self No No   Sig: Take 1 capsule by mouth daily   amphetamine-dextroamphetamine (ADDERALL) 20 mg tablet   No No   Sig: Take 1 tablet (20 mg total) by mouth 2 (two) times a day Max Daily Amount: 40 mg   clonazePAM (KlonoPIN) 2 mg tablet   No No   Sig: Take 1 tablet (2 mg total) by mouth 3 (three) times a day   naloxone (NARCAN) 4 mg/0.1 mL nasal spray  Self No No   Sig: Administer 1 spray into a nostril. If no response after 2-3 minutes, give another dose in the other nostril using a new spray.      Facility-Administered Medications: None     Discharge Medication List as of 3/13/2025 10:35 PM        CONTINUE these medications which have NOT CHANGED    Details   amphetamine-dextroamphetamine (ADDERALL) 20 mg tablet Take 1 tablet (20 mg total) by mouth 2 (two) times a day Max Daily Amount: 40 mg, Starting Thu 3/13/2025, Until Sat 4/12/2025, Normal      Cholecalciferol (Vitamin D3) 1000 units CAPS Take 1 capsule by mouth daily, Starting Thu 6/27/2024, Until Thu 3/13/2025, Normal      clonazePAM (KlonoPIN) 2 mg tablet Take 1 tablet (2 mg total) by mouth 3 (three) times a day, Starting Thu 3/13/2025, Normal      naloxone  (NARCAN) 4 mg/0.1 mL nasal spray Administer 1 spray into a nostril. If no response after 2-3 minutes, give another dose in the other nostril using a new spray., Normal           No discharge procedures on file.  ED SEPSIS DOCUMENTATION   Time reflects when diagnosis was documented in both MDM as applicable and the Disposition within this note       Time User Action Codes Description Comment    3/13/2025  9:20 PM Edin Spivey [T50.901A] Accidental overdose     3/13/2025  9:20 PM Edin Spivey [R06.81] Apnea                  Edin Spivey MD  03/13/25 0367

## 2025-03-13 NOTE — ASSESSMENT & PLAN NOTE
Orders:  •  clonazePAM (KlonoPIN) 2 mg tablet; Take 1 tablet (2 mg total) by mouth 3 (three) times a day
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
## 2025-03-14 PROBLEM — G92.8 TOXIC METABOLIC ENCEPHALOPATHY: Status: ACTIVE | Noted: 2025-03-14

## 2025-03-14 PROBLEM — F11.20 OPIOID USE DISORDER, SEVERE, ON MAINTENANCE THERAPY, DEPENDENCE (HCC): Status: ACTIVE | Noted: 2025-03-14

## 2025-03-14 PROBLEM — Z79.899 CHRONIC PRESCRIPTION BENZODIAZEPINE USE: Status: ACTIVE | Noted: 2025-03-14

## 2025-03-14 LAB
ALBUMIN SERPL BCG-MCNC: 3.7 G/DL (ref 3.5–5)
ALP SERPL-CCNC: 37 U/L (ref 34–104)
ALT SERPL W P-5'-P-CCNC: 20 U/L (ref 7–52)
ANION GAP SERPL CALCULATED.3IONS-SCNC: 5 MMOL/L (ref 4–13)
AST SERPL W P-5'-P-CCNC: 23 U/L (ref 13–39)
ATRIAL RATE: 51 BPM
BASE EX.OXY STD BLDV CALC-SCNC: 48.4 % (ref 60–80)
BASE EXCESS BLDV CALC-SCNC: 0.8 MMOL/L
BASOPHILS # BLD AUTO: 0.05 THOUSANDS/ÂΜL (ref 0–0.1)
BASOPHILS NFR BLD AUTO: 1 % (ref 0–1)
BILIRUB SERPL-MCNC: 0.74 MG/DL (ref 0.2–1)
BUN SERPL-MCNC: 12 MG/DL (ref 5–25)
CA-I BLD-SCNC: 1.14 MMOL/L (ref 1.12–1.32)
CALCIUM SERPL-MCNC: 8.9 MG/DL (ref 8.4–10.2)
CHLORIDE SERPL-SCNC: 106 MMOL/L (ref 96–108)
CK SERPL-CCNC: 304 U/L (ref 39–308)
CO2 SERPL-SCNC: 29 MMOL/L (ref 21–32)
CREAT SERPL-MCNC: 0.75 MG/DL (ref 0.6–1.3)
EOSINOPHIL # BLD AUTO: 0.24 THOUSAND/ÂΜL (ref 0–0.61)
EOSINOPHIL NFR BLD AUTO: 3 % (ref 0–6)
ERYTHROCYTE [DISTWIDTH] IN BLOOD BY AUTOMATED COUNT: 11.8 % (ref 11.6–15.1)
GFR SERPL CREATININE-BSD FRML MDRD: 117 ML/MIN/1.73SQ M
GLUCOSE SERPL-MCNC: 91 MG/DL (ref 65–140)
HCO3 BLDV-SCNC: 25.7 MMOL/L (ref 24–30)
HCT VFR BLD AUTO: 32.7 % (ref 36.5–49.3)
HGB BLD-MCNC: 11.2 G/DL (ref 12–17)
HOROWITZ INDEX BLDA+IHG-RTO: 40 MM[HG]
IMM GRANULOCYTES # BLD AUTO: 0.02 THOUSAND/UL (ref 0–0.2)
IMM GRANULOCYTES NFR BLD AUTO: 0 % (ref 0–2)
INR PPP: 1.16 (ref 0.85–1.19)
LYMPHOCYTES # BLD AUTO: 2.67 THOUSANDS/ÂΜL (ref 0.6–4.47)
LYMPHOCYTES NFR BLD AUTO: 35 % (ref 14–44)
MAGNESIUM SERPL-MCNC: 1.7 MG/DL (ref 1.9–2.7)
MCH RBC QN AUTO: 30.1 PG (ref 26.8–34.3)
MCHC RBC AUTO-ENTMCNC: 34.3 G/DL (ref 31.4–37.4)
MCV RBC AUTO: 88 FL (ref 82–98)
MONOCYTES # BLD AUTO: 0.59 THOUSAND/ÂΜL (ref 0.17–1.22)
MONOCYTES NFR BLD AUTO: 8 % (ref 4–12)
NEUTROPHILS # BLD AUTO: 4.17 THOUSANDS/ÂΜL (ref 1.85–7.62)
NEUTS SEG NFR BLD AUTO: 53 % (ref 43–75)
NRBC BLD AUTO-RTO: 0 /100 WBCS
O2 CT BLDV-SCNC: 8.6 ML/DL
P AXIS: 56 DEGREES
PCO2 BLDV: 42.1 MM HG (ref 42–50)
PEEP RESPIRATORY: 6 CM[H2O]
PH BLDV: 7.4 [PH] (ref 7.3–7.4)
PHOSPHATE SERPL-MCNC: 3 MG/DL (ref 2.7–4.5)
PLATELET # BLD AUTO: 160 THOUSANDS/UL (ref 149–390)
PMV BLD AUTO: 9.4 FL (ref 8.9–12.7)
PO2 BLDV: 25.9 MM HG (ref 35–45)
POTASSIUM SERPL-SCNC: 3.6 MMOL/L (ref 3.5–5.3)
PR INTERVAL: 154 MS
PROT SERPL-MCNC: 5.9 G/DL (ref 6.4–8.4)
PROTHROMBIN TIME: 14.9 SECONDS (ref 12.3–15)
QRS AXIS: 72 DEGREES
QRSD INTERVAL: 100 MS
QT INTERVAL: 454 MS
QTC INTERVAL: 419 MS
RBC # BLD AUTO: 3.72 MILLION/UL (ref 3.88–5.62)
SODIUM SERPL-SCNC: 140 MMOL/L (ref 135–147)
T WAVE AXIS: 78 DEGREES
VENT AC: 18
VENT- AC: AC
VENTRICULAR RATE: 51 BPM
VT SETTING VENT: 400 ML
WBC # BLD AUTO: 7.74 THOUSAND/UL (ref 4.31–10.16)

## 2025-03-14 PROCEDURE — 93010 ELECTROCARDIOGRAM REPORT: CPT | Performed by: INTERNAL MEDICINE

## 2025-03-14 PROCEDURE — 99232 SBSQ HOSP IP/OBS MODERATE 35: CPT | Performed by: INTERNAL MEDICINE

## 2025-03-14 PROCEDURE — 84100 ASSAY OF PHOSPHORUS: CPT

## 2025-03-14 PROCEDURE — 94003 VENT MGMT INPAT SUBQ DAY: CPT

## 2025-03-14 PROCEDURE — 5A1935Z RESPIRATORY VENTILATION, LESS THAN 24 CONSECUTIVE HOURS: ICD-10-PCS | Performed by: STUDENT IN AN ORGANIZED HEALTH CARE EDUCATION/TRAINING PROGRAM

## 2025-03-14 PROCEDURE — 83735 ASSAY OF MAGNESIUM: CPT

## 2025-03-14 PROCEDURE — 99255 IP/OBS CONSLTJ NEW/EST HI 80: CPT | Performed by: EMERGENCY MEDICINE

## 2025-03-14 PROCEDURE — 85025 COMPLETE CBC W/AUTO DIFF WBC: CPT

## 2025-03-14 PROCEDURE — 82550 ASSAY OF CK (CPK): CPT

## 2025-03-14 PROCEDURE — 82330 ASSAY OF CALCIUM: CPT

## 2025-03-14 PROCEDURE — 93005 ELECTROCARDIOGRAM TRACING: CPT

## 2025-03-14 PROCEDURE — 94760 N-INVAS EAR/PLS OXIMETRY 1: CPT

## 2025-03-14 PROCEDURE — 82805 BLOOD GASES W/O2 SATURATION: CPT

## 2025-03-14 PROCEDURE — 80053 COMPREHEN METABOLIC PANEL: CPT

## 2025-03-14 PROCEDURE — 85610 PROTHROMBIN TIME: CPT

## 2025-03-14 RX ORDER — BUPRENORPHINE 2 MG/1
2 TABLET SUBLINGUAL
Status: COMPLETED | OUTPATIENT
Start: 2025-03-14 | End: 2025-03-14

## 2025-03-14 RX ORDER — POTASSIUM CHLORIDE 1500 MG/1
20 TABLET, EXTENDED RELEASE ORAL ONCE
Status: DISCONTINUED | OUTPATIENT
Start: 2025-03-14 | End: 2025-03-14

## 2025-03-14 RX ORDER — SODIUM CHLORIDE, SODIUM GLUCONATE, SODIUM ACETATE, POTASSIUM CHLORIDE, MAGNESIUM CHLORIDE, SODIUM PHOSPHATE, DIBASIC, AND POTASSIUM PHOSPHATE .53; .5; .37; .037; .03; .012; .00082 G/100ML; G/100ML; G/100ML; G/100ML; G/100ML; G/100ML; G/100ML
1000 INJECTION, SOLUTION INTRAVENOUS ONCE
Status: COMPLETED | OUTPATIENT
Start: 2025-03-14 | End: 2025-03-14

## 2025-03-14 RX ORDER — MAGNESIUM SULFATE HEPTAHYDRATE 40 MG/ML
4 INJECTION, SOLUTION INTRAVENOUS ONCE
Status: COMPLETED | OUTPATIENT
Start: 2025-03-14 | End: 2025-03-14

## 2025-03-14 RX ORDER — CLONAZEPAM 0.5 MG/1
2 TABLET ORAL 3 TIMES DAILY
Status: DISCONTINUED | OUTPATIENT
Start: 2025-03-14 | End: 2025-03-15 | Stop reason: HOSPADM

## 2025-03-14 RX ORDER — BUPRENORPHINE AND NALOXONE 8; 2 MG/1; MG/1
8 FILM, SOLUBLE BUCCAL; SUBLINGUAL 2 TIMES DAILY
Status: DISCONTINUED | OUTPATIENT
Start: 2025-03-15 | End: 2025-03-14

## 2025-03-14 RX ORDER — FENTANYL CITRATE 50 UG/ML
50 INJECTION, SOLUTION INTRAMUSCULAR; INTRAVENOUS EVERY 2 HOUR PRN
Refills: 0 | Status: DISCONTINUED | OUTPATIENT
Start: 2025-03-14 | End: 2025-03-14

## 2025-03-14 RX ORDER — HYDROMORPHONE HCL/PF 1 MG/ML
1 SYRINGE (ML) INJECTION ONCE
Status: COMPLETED | OUTPATIENT
Start: 2025-03-14 | End: 2025-03-14

## 2025-03-14 RX ORDER — BUPRENORPHINE AND NALOXONE 8; 2 MG/1; MG/1
8 FILM, SOLUBLE BUCCAL; SUBLINGUAL 2 TIMES DAILY
Status: DISCONTINUED | OUTPATIENT
Start: 2025-03-15 | End: 2025-03-15 | Stop reason: HOSPADM

## 2025-03-14 RX ORDER — DEXTROAMPHETAMINE SACCHARATE, AMPHETAMINE ASPARTATE, DEXTROAMPHETAMINE SULFATE AND AMPHETAMINE SULFATE 2.5; 2.5; 2.5; 2.5 MG/1; MG/1; MG/1; MG/1
20 TABLET ORAL 2 TIMES DAILY
Refills: 0 | Status: DISCONTINUED | OUTPATIENT
Start: 2025-03-14 | End: 2025-03-15 | Stop reason: HOSPADM

## 2025-03-14 RX ORDER — PROPOFOL 10 MG/ML
5-50 INJECTION, EMULSION INTRAVENOUS
Status: DISCONTINUED | OUTPATIENT
Start: 2025-03-14 | End: 2025-03-14

## 2025-03-14 RX ORDER — BUPRENORPHINE AND NALOXONE 8; 2 MG/1; MG/1
16 FILM, SOLUBLE BUCCAL; SUBLINGUAL DAILY
Status: DISCONTINUED | OUTPATIENT
Start: 2025-03-16 | End: 2025-03-15 | Stop reason: HOSPADM

## 2025-03-14 RX ORDER — FENTANYL CITRATE 50 UG/ML
INJECTION, SOLUTION INTRAMUSCULAR; INTRAVENOUS
Status: COMPLETED
Start: 2025-03-14 | End: 2025-03-14

## 2025-03-14 RX ADMIN — SODIUM CHLORIDE, SODIUM GLUCONATE, SODIUM ACETATE, POTASSIUM CHLORIDE, MAGNESIUM CHLORIDE, SODIUM PHOSPHATE, DIBASIC, AND POTASSIUM PHOSPHATE 1000 ML: .53; .5; .37; .037; .03; .012; .00082 INJECTION, SOLUTION INTRAVENOUS at 00:15

## 2025-03-14 RX ADMIN — CHLORHEXIDINE GLUCONATE 15 ML: 1.2 SOLUTION ORAL at 08:27

## 2025-03-14 RX ADMIN — ENOXAPARIN SODIUM 40 MG: 40 INJECTION SUBCUTANEOUS at 08:27

## 2025-03-14 RX ADMIN — FENTANYL CITRATE 50 MCG: 50 INJECTION INTRAMUSCULAR; INTRAVENOUS at 07:15

## 2025-03-14 RX ADMIN — CLONAZEPAM 2 MG: 0.5 TABLET ORAL at 16:40

## 2025-03-14 RX ADMIN — BUPRENORPHINE 2 MG: 2 TABLET SUBLINGUAL at 20:01

## 2025-03-14 RX ADMIN — DEXTROAMPHETAMINE SACCHARATE, AMPHETAMINE ASPARTATE, DEXTROAMPHETAMINE SULFATE AND AMPHETAMINE SULFATE 20 MG: 2.5; 2.5; 2.5; 2.5 TABLET ORAL at 17:26

## 2025-03-14 RX ADMIN — BUPRENORPHINE 2 MG: 2 TABLET SUBLINGUAL at 23:53

## 2025-03-14 RX ADMIN — BUPRENORPHINE 2 MG: 2 TABLET SUBLINGUAL at 21:51

## 2025-03-14 RX ADMIN — CHLORHEXIDINE GLUCONATE 15 ML: 1.2 SOLUTION ORAL at 20:28

## 2025-03-14 RX ADMIN — MAGNESIUM SULFATE HEPTAHYDRATE 4 G: 40 INJECTION, SOLUTION INTRAVENOUS at 08:26

## 2025-03-14 RX ADMIN — PROPOFOL 25 MCG/KG/MIN: 10 INJECTION, EMULSION INTRAVENOUS at 02:06

## 2025-03-14 RX ADMIN — CLONAZEPAM 2 MG: 0.5 TABLET ORAL at 20:28

## 2025-03-14 RX ADMIN — FENTANYL CITRATE 50 MCG: 50 INJECTION, SOLUTION INTRAMUSCULAR; INTRAVENOUS at 07:15

## 2025-03-14 RX ADMIN — BUPRENORPHINE 2 MG: 2 TABLET SUBLINGUAL at 18:04

## 2025-03-14 RX ADMIN — HYDROMORPHONE HYDROCHLORIDE 1 MG: 1 INJECTION, SOLUTION INTRAMUSCULAR; INTRAVENOUS; SUBCUTANEOUS at 07:36

## 2025-03-14 RX ADMIN — DEXTROAMPHETAMINE SACCHARATE, AMPHETAMINE ASPARTATE, DEXTROAMPHETAMINE SULFATE AND AMPHETAMINE SULFATE 20 MG: 2.5; 2.5; 2.5; 2.5 TABLET ORAL at 12:03

## 2025-03-14 RX ADMIN — CLONAZEPAM 2 MG: 0.5 TABLET ORAL at 12:03

## 2025-03-14 NOTE — EMTALA/ACUTE CARE TRANSFER
[facility] EMERGENCY DEPARTMENT  [address] [facility] [address]  [phone]  Dept: [phone]      EMTALA TRANSFER CONSENT    NAME Kapil Cordova                                         1985                              MRN [id number]    I have been informed of my rights regarding examination, treatment, and transfer   by Dr. Edin Spivey MD    Benefits: Specialized equipment and/or services available at the receiving facility (Include comment)________________________ (ICU)    Risks: Potential for delay in receiving treatment, Loss of IV, Increased discomfort during transfer, Possible worsening of condition or death during transfer, Potential deterioration of medical condition      Consent for Transfer:  I acknowledge that my medical condition has been evaluated and explained to me by the emergency department physician or other qualified medical person and/or my attending physician, who has recommended that I be transferred to the service of  Accepting Physician: Dr. Hurd at Accepting Facility Name, City & State : [facility]. The above potential benefits of such transfer, the potential risks associated with such transfer, and the probable risks of not being transferred have been explained to me, and I fully understand them.  The doctor has explained that, in my case, the benefits of transfer outweigh the risks.  I agree to be transferred.    I authorize the performance of emergency medical procedures and treatments upon me in both transit and upon arrival at the receiving facility.  Additionally, I authorize the release of any and all medical records to the receiving facility and request they be transported with me, if possible.  I understand that the safest mode of transportation during a medical emergency is an ambulance and that the Hospital advocates the use of this mode of transport. Risks of traveling to the receiving facility by car, including absence of medical control,  life sustaining equipment, such as oxygen, and medical personnel has been explained to me and I fully understand them.    (BRENNEN CORRECT BOX BELOW)  [  ]  I consent to the stated transfer and to be transported by ambulance/helicopter.  [  ]  I consent to the stated transfer, but refuse transportation by ambulance and accept full responsibility for my transportation by car.  I understand the risks of non-ambulance transfers and I exonerate the Hospital and its staff from any deterioration in my condition that results from this refusal.    X___________________________________________    DATE  25  TIME________  Signature of patient or legally responsible individual signing on patient behalf           RELATIONSHIP TO PATIENT_________________________          Provider Certification    NAME Kapil Cordova                                         1985                              MRN [id number]    A medical screening exam was performed on the above named patient.  Based on the examination:    Condition Necessitating Transfer The primary encounter diagnosis was Accidental overdose. A diagnosis of Apnea was also pertinent to this visit.    Patient Condition: The patient has been stabilized such that within reasonable medical probability, no material deterioration of the patient condition or the condition of the unborn child(rigo) is likely to result from the transfer    Reason for Transfer: Level of Care needed not available at this facility    Transfer Requirements: Facility [facility]   Space available and qualified personnel available for treatment as acknowledged by    Agreed to accept transfer and to provide appropriate medical treatment as acknowledged by       Dr. Hurd  Appropriate medical records of the examination and treatment of the patient are provided at the time of transfer   STAFF INITIAL WHEN COMPLETED _______  Transfer will be performed by qualified personnel from    and appropriate transfer  equipment as required, including the use of necessary and appropriate life support measures.    Provider Certification: I have examined the patient and explained the following risks and benefits of being transferred/refusing transfer to the patient/family:  General risk, such as traffic hazards, adverse weather conditions, rough terrain or turbulence, possible failure of equipment (including vehicle or aircraft), or consequences of actions of persons outside the control of the transport personnel, Unanticipated needs of medical equipment and personnel during transport, Risk of worsening condition, The possibility of a transport vehicle being unavailable      Based on these reasonable risks and benefits to the patient and/or the unborn child(rigo), and based upon the information available at the time of the patient’s examination, I certify that the medical benefits reasonably to be expected from the provision of appropriate medical treatments at another medical facility outweigh the increasing risks, if any, to the individual’s medical condition, and in the case of labor to the unborn child, from effecting the transfer.    X____________________________________________ DATE 03/13/25        TIME_______      ORIGINAL - SEND TO MEDICAL RECORDS   COPY - SEND WITH PATIENT DURING TRANSFER

## 2025-03-14 NOTE — SEPSIS NOTE
Sepsis Note   Kapil Cordova 39 y.o. male MRN: [id number]  Unit/Bed#: ICU 12 Encounter: [id number]    SIRS in the setting of overdose w/ acute hypoxic and hypercapnic respiratory failure requiring invasive mechanical ventilation. No source of infection suspected. Monitor off abx.      Initial Sepsis Screening       Row Name 03/14/25 0045                Is the patient's history suggestive of a new or worsening infection? No  -JE                  User Key  (r) = Recorded By, (t) = Taken By, (c) = Cosigned By      Initials Name Provider Type    SATISH Doyle PA-C Physician Assistant                        There is no height or weight on file to calculate BMI.  Wt Readings from Last 1 Encounters:   03/13/25 74.7 kg (164 lb 10.9 oz)     IBW (Ideal Body Weight): 73 kg    Ideal body weight: 73 kg (160 lb 15 oz)  Adjusted ideal body weight: 73.7 kg (162 lb 7 oz)     Orthopedic

## 2025-03-14 NOTE — UTILIZATION REVIEW
Notification of Unplanned, Urgent, or   Emergency Inpatient Admission   AUTHORIZATION REQUEST   [place]mitting [place] [address]  Tax ID: [id number]  NPI: [id number]  Place of Service: Acute [facility] Hospital  Admission Level of Care: Inpatient  Place of Service Code: 21     Attending Physician Information  Attending Name and NPI#: Nelli Hurd Md [[id number]]  Phone: [phone]     Admission Information  Inpatient Admission Date/Time: 3/13/25 10:57 PM  Discharge Date/Time: No discharge date for patient encounter.  Admitting Diagnosis Code/Description:  Overdose [T50.901A]     Utilization Review Contact  Nava Lundy, Utilization   Phone: [phone]  Fax: [phone]  Email: [email or web address]  Contact for approvals/pending authorizations, clinical reviews, and discharge.     Physician Advisory Services Contact  Medical Necessity Denial & Cuvk-et-Ucjm Discussion  Phone: [phone]  Fax: [phone]  Email: [email or web address]     DISCHARGE SUPPORT TEAM:  For Patients Discharge Needs & Updates  Phone: [phone] opt. 2 Fax: [phone]  Email: [email or web address]

## 2025-03-14 NOTE — ASSESSMENT & PLAN NOTE
Due to polysubstance ingestion  Improving overnight, woke up and followed commands off sedation  Propofol added  SAT today

## 2025-03-14 NOTE — ASSESSMENT & PLAN NOTE
Prior to hospitalization, sober since October, compliant with Suboxone 16 mg sublingual every morning    Will give Subutex 2 mg every 2 hours for doses  Then Suboxone 8 mg twice daily 3/15  Then Suboxone 16 mg sublingual daily 3/16 and continue at discharge    Patient expressed desire for possible rehab, CRS consult for evaluation

## 2025-03-14 NOTE — CONSULTS
Consultation - Emergency Medicine   Name: Kapil Cordova 39 y.o. male I MRN: [id number]  Unit/Bed#: ICU 12 I Date of Admission: 3/13/2025   Date of Service: 3/14/2025 I Hospital Day: 1       Inpatient consult to Toxicology     Date/Time  3/14/2025 5:30 PM     Performed by  Francisco Javier Oswald MD   Authorized by  BRENDA Walls           Physician Requesting Evaluation: Caleb Shannon MD   Reason for Evaluation / Principal Problem: Unintentional/intentional overdose, polysubstance abuse    Assessment & Plan  Overdose  Patient denies suicidal ideation, took 1 bag IV of what he believed to be heroin prior to arrival, intubated for airway protection and toxic metabolic encephalopathy, extubated on 3/14/2025.    Due to nonresponsiveness to Narcan, possible xylazine or tranq overdose or coingestion    Patient would benefit from psychiatric and CRS evaluation given timing of overdose and months of sobriety and Suboxone compliance  Toxic metabolic encephalopathy  Found to be barbiturate, opioid, amphetamine, benzo positive on urine drug screening in the ED  Patient currently alert and oriented to self, place, situation, and date  Patient speaking with slow elder, unclear if due to underlying depression versus resolving toxic metabolic encephalopathy  Continue to monitor  Opioid use disorder, severe, on maintenance therapy, dependence (HCC)  Prior to hospitalization, sober since October, compliant with Suboxone 16 mg sublingual every morning    Will give Subutex 2 mg every 2 hours for doses  Then Suboxone 8 mg twice daily 3/15  Then Suboxone 16 mg sublingual daily 3/16 and continue at discharge    Patient expressed desire for possible rehab, CRS consult for evaluation  Chronic prescription benzodiazepine use  Patient compliant for months with home prescription  Continue home clonazepam 2 mg oral 3 times daily  No clinical indication for taper or discontinuation  Follow-up in outpatient setting  Polysubstance abuse  (HCC)  See management above      Please see additional teaching note below (if available):      For further questions, please contact the medical  on call via SecureChat between 8am and 9pm. If between 9pm and 8am, please reach out to the Poison Center at [phone].     History of Present Illness   Kapil Cordova is a 39 y.o. year old male who presents for evaluation after possible heroin versus heroin xylazine overdose.  Patient initially was brought in by EMS after being found down and unresponsive in public restroom on 3/13.  Initially presented to [facility] ED, was intubated, and then transferred to [facility] ICU for further management.  En route, patient was found to be persistently unresponsive to painful stimuli, pupils pinpoint and unreactive, weak cough/gag.  Despite administration of Narcan patient persistently unresponsive.  Patient given repeat dosage in the ED, still remained unresponsive, intubated for airway protection.  Patient then admitted to ICU extubated on 3/14.  Toxicology consulted.    Upon evaluation, patient states that he took approximately 1 bag what he believed to be heroin IV.  States that he was feeling very depressed and lethargic throughout the day.  States that he has been sober since October of last year and compliant with taking his home Suboxone which she takes morning and night sublingual.  Patient also states that he takes his prescribed Adderall and Klonopin as directed.  Currently describes a bit of a sore throat after extubation, otherwise denies any chest pain, shortness of breath, nausea, vomiting, abdominal pain, diarrhea, diaphoresis, ambulatory dysfunction, seizures, seizure-like activity, lightheadedness, presyncope, visual disturbances.  ROS otherwise negative.      Of note, patient's father recently completed suicide.  Patient recently completed rehab program.    Review of Systems    Historical Information   Historical Information   Past  Medical History:   Diagnosis Date    Heroin abuse (HCC)      No past surgical history on file.  Social History     Tobacco Use    Smoking status: Former     Types: Cigarettes    Smokeless tobacco: Never   Vaping Use    Vaping status: Every Day    Substances: Nicotine   Substance and Sexual Activity    Alcohol use: Yes     Comment: a beer a month    Drug use: Yes     Types: Marijuana    Sexual activity: Yes     E-Cigarette/Vaping    E-Cigarette Use Current Every Day User      E-Cigarette/Vaping Substances    Nicotine Yes     THC No     CBD No     Flavoring No     Other No     Unknown No        Social History     Tobacco Use    Smoking status: Former     Types: Cigarettes    Smokeless tobacco: Never   Vaping Use    Vaping status: Every Day    Substances: Nicotine   Substance and Sexual Activity    Alcohol use: Yes     Comment: a beer a month    Drug use: Yes     Types: Marijuana    Sexual activity: Yes       Current Facility-Administered Medications:     amphetamine-dextroamphetamine (ADDERALL) tablet 20 mg, BID    buprenorphine (SUBUTEX) 2 mg SL tablet 2 mg, Q2H    [START ON 3/16/2025] buprenorphine-naloxone (Suboxone) film 16 mg, Daily    [START ON 3/15/2025] buprenorphine-naloxone (Suboxone) film 8 mg, BID    chlorhexidine (PERIDEX) 0.12 % oral rinse 15 mL, Q12H ROSY    clonazePAM (KlonoPIN) tablet 2 mg, TID    enoxaparin (LOVENOX) subcutaneous injection 40 mg, Daily  Prior to Admission Medications   Prescriptions Last Dose Informant Patient Reported? Taking?   Cholecalciferol (Vitamin D3) 1000 units CAPS  Self No No   Sig: Take 1 capsule by mouth daily   amphetamine-dextroamphetamine (ADDERALL) 20 mg tablet   No No   Sig: Take 1 tablet (20 mg total) by mouth 2 (two) times a day Max Daily Amount: 40 mg   clonazePAM (KlonoPIN) 2 mg tablet   No No   Sig: Take 1 tablet (2 mg total) by mouth 3 (three) times a day   naloxone (NARCAN) 4 mg/0.1 mL nasal spray  Self No No   Sig: Administer 1 spray into a nostril. If no  response after 2-3 minutes, give another dose in the other nostril using a new spray.      Facility-Administered Medications: None     Social History     Tobacco Use    Smoking status: Former     Types: Cigarettes    Smokeless tobacco: Never   Vaping Use    Vaping status: Every Day    Substances: Nicotine   Substance and Sexual Activity    Alcohol use: Yes     Comment: a beer a month    Drug use: Yes     Types: Marijuana    Sexual activity: Yes     Family History   Problem Relation Age of Onset    No Known Problems Mother     Completed Suicide  Father        Meds/Allergies   Prior to Admission medications    Medication Sig Start Date End Date Taking? Authorizing Provider   amphetamine-dextroamphetamine (ADDERALL) 20 mg tablet Take 1 tablet (20 mg total) by mouth 2 (two) times a day Max Daily Amount: 40 mg 3/13/25 4/12/25  Reese Matrone, DO   Cholecalciferol (Vitamin D3) 1000 units CAPS Take 1 capsule by mouth daily 6/27/24 3/13/25  Reese Jeffriesone, DO   clonazePAM (KlonoPIN) 2 mg tablet Take 1 tablet (2 mg total) by mouth 3 (three) times a day 3/13/25   Reese Gomes, DO   naloxone (NARCAN) 4 mg/0.1 mL nasal spray Administer 1 spray into a nostril. If no response after 2-3 minutes, give another dose in the other nostril using a new spray. 5/22/23 6/27/24  Reese Gomes, DO     Current Facility-Administered Medications:     amphetamine-dextroamphetamine (ADDERALL) tablet 20 mg, 20 mg, Oral, BID, BRENDA Walls, 20 mg at 03/14/25 1726    buprenorphine (SUBUTEX) 2 mg SL tablet 2 mg, 2 mg, Sublingual, Q2H, Francisco Javier Oswald MD    [START ON 3/16/2025] buprenorphine-naloxone (Suboxone) film 16 mg, 16 mg, Sublingual, Daily, Francisco Javier Oswald MD    [START ON 3/15/2025] buprenorphine-naloxone (Suboxone) film 8 mg, 8 mg, Sublingual, BID, Francisco Javier Oswald MD    chlorhexidine (PERIDEX) 0.12 % oral rinse 15 mL, 15 mL, Mouth/Throat, Q12H [facility], Bret Doyle PA-C, 15 mL at 03/14/25 0827    clonazePAM (KlonoPIN) tablet  2 mg, 2 mg, Oral, TID, BRENDA Walls, 2 mg at 03/14/25 1640    enoxaparin (LOVENOX) subcutaneous injection 40 mg, 40 mg, Subcutaneous, Daily, Bret Doyle PA-C, 40 mg at 03/14/25 0827   No Known Allergies    Objective :  Temp:  [95.4 °F (35.2 °C)-100 °F (37.8 °C)] 100 °F (37.8 °C)  HR:  [] 106  BP: ()/(39-99) 105/66  Resp:  [0-23] 13  SpO2:  [95 %-100 %] 100 %  O2 Device: None (Room air)      Intake/Output Summary (Last 24 hours) at 3/14/2025 1755  Last data filed at 3/14/2025 1623  Gross per 24 hour   Intake 2191.57 ml   Output 1885 ml   Net 306.57 ml       Physical Exam  Vitals and nursing note reviewed.   Constitutional:       General: He is not in acute distress.     Appearance: He is well-developed.   HENT:      Head: Normocephalic and atraumatic.   Eyes:      Conjunctiva/sclera: Conjunctivae normal.   Cardiovascular:      Rate and Rhythm: Normal rate and regular rhythm.      Heart sounds: No murmur heard.     No friction rub. No gallop.   Pulmonary:      Effort: Pulmonary effort is normal. No respiratory distress.      Breath sounds: Normal breath sounds. No wheezing, rhonchi or rales.   Abdominal:      General: There is no distension.      Palpations: Abdomen is soft.      Tenderness: There is no abdominal tenderness. There is no guarding or rebound.   Musculoskeletal:         General: No swelling.      Cervical back: Neck supple.   Skin:     General: Skin is warm and dry.      Capillary Refill: Capillary refill takes less than 2 seconds.   Neurological:      General: No focal deficit present.      Mental Status: He is alert and oriented to person, place, and time.   Psychiatric:         Attention and Perception: He does not perceive auditory or visual hallucinations.         Mood and Affect: Mood is depressed. Mood is not anxious. Affect is not blunt or angry.         Thought Content: Thought content does not include homicidal or suicidal ideation. Thought content does not  include homicidal or suicidal plan.           Lab Results: I have reviewed the following results:  Results from last 7 days   Lab Units 03/14/25 0424 03/13/25 2134   WBC Thousand/uL 7.74 8.32   HEMOGLOBIN g/dL 11.2* 11.1*   HEMATOCRIT % 32.7* 33.8*   PLATELETS Thousands/uL 160 160   SEGS PCT % 53 57   LYMPHO PCT % 35 32   MONO PCT % 8 7   EOS PCT % 3 3      Results from last 7 days   Lab Units 03/14/25 0424   POTASSIUM mmol/L 3.6   CHLORIDE mmol/L 106   CO2 mmol/L 29   BUN mg/dL 12   CREATININE mg/dL 0.75   CALCIUM mg/dL 8.9   ALBUMIN g/dL 3.7   ALK PHOS U/L 37   ALT U/L 20   AST U/L 23   MAGNESIUM mg/dL 1.7*   PHOSPHORUS mg/dL 3.0      Results from last 7 days   Lab Units 03/14/25 0424 03/13/25 2134   INR  1.16 1.22*   PTT seconds  --  38*     Results from last 7 days   Lab Units 03/13/25 2134   LACTIC ACID mmol/L 0.9          Results from last 7 days   Lab Units 03/14/25  0024   PH QUIANA  7.403*   PCO2 QUIANA mm Hg 42.1   PO2 QUIANA mm Hg 25.9*   HCO3 QUIANA mmol/L 25.7   O2 CONTENT QUIANA ml/dL 8.6   O2 HGB, VENOUS % 48.4*     Results from last 7 days   Lab Units 03/13/25 2134   ACETAMINOPHEN LVL ug/mL <2*   ETHANOL LVL mg/dL <10   SALICYLATE LVL mg/dL <5           Administrative Statements

## 2025-03-14 NOTE — PROGRESS NOTES
Progress Note - Critical Care/ICU   Name: Kapil Cordova 39 y.o. male I MRN: [id number]  Unit/Bed#: ICU 12 I Date of Admission: 3/13/2025   Date of Service: 3/14/2025 I Hospital Day: 1      Assessment & Plan  Overdose  Intentional vs. Unintentional - in the context of father recently completing suicide   Known hx of polysubstance abuse  Found unresponsive in public restroom 3/13, did not respond to 8mg Narcan  UDS + fentanyl, benzos, barbiturates, amphetamines  Intubated in  ED  Became alert and followed commands 3/14 early AM  Q2 neuro checks  No further antidotes give polysubstance overdose  Consider psych consult once medically optimized   Acute respiratory failure with hypoxia and hypercapnia (HCC)  Due to overdose  Intubated for airway protection and unresponsiveness in  ED  AC/VC 18/500/6  8.0 ETT, 25 at lip, satisfactory positioning on CXR  Woke up and followed commands 3/14 early AM  Propofol targetting RASS 0 to -1  Check VBG to assess vent settings  SAT and SBT today, if passes then extubation    Polysubstance abuse (HCC)  Known hx of using methamphetamines, THC, mushrooms, benzos, opioids   Has had prior hospitalizations for overdose  Is prescribed adderall and clonazepam outpt  PDMP reviewed 3/13  Recently finished rehabilitation   Case management consult   Elevated CK  Unknown down time prior to being found in public restroom   CK on admission  Trend   1L isolyte bolus, LR @ 100  Monitor urine output   Toxic metabolic encephalopathy  Due to polysubstance ingestion  Improving overnight, woke up and followed commands off sedation  Propofol added  SAT today  Disposition: Critical care    ICU Core Measures     Vented Patient  VAP Bundle  VAP bundle ordered     A: Assess, Prevent, and Manage Pain Has pain been assessed? Yes  Need for changes to pain regimen? No   B: Both Spontaneous Awakening Trials (SATs) and Spontaneous Breathing Trials (SBTs) Plan to perform spontaneous awakening trial today? Yes    Plan to perform spontaneous breathing trial today? Yes   Obvious barriers to extubation? No   C: Choice of Sedation RASS Goal: 0 Alert and Calm  Need for changes to sedation or analgesia regimen? No   D: Delirium CAM-ICU: Unable to perform secondary to Acute cognitive dysfunction   E: Early Mobility  Plan for early mobility? Yes   F: Family Engagement Plan for family engagement today? Yes       Review of Invasive Devices:    Nieves Plan:  remove nieves after extubation  Central access plan:  discontinued       Prophylaxis:  VTE VTE covered by:  enoxaparin, Subcutaneous       Stress Ulcer  not ordered         24 Hour Events : Hospital day 2 overdose with acute encephalopathy requiring intubation and mechanical ventilation. Overnight woke up while off sedation and followed commands. Propofol added back. Plan for SAT and SBT today and possible extubation.     Subjective   Review of Systems: Review of Systems not obtainable due to Clinical Condition    Objective :                   Vitals I/O      Most Recent Min/Max in 24hrs   Temp (!) 97.2 °F (36.2 °C) Temp  Min: 95.4 °F (35.2 °C)  Max: 97.7 °F (36.5 °C)   Pulse 56 Pulse  Min: 50  Max: 116   Resp 18 Resp  Min: 0  Max: 23   /77 BP  Min: 105/77  Max: 128/56   O2 Sat 100 % SpO2  Min: 95 %  Max: 100 %      Intake/Output Summary (Last 24 hours) at 3/14/2025 0440  Last data filed at 3/14/2025 0206  Gross per 24 hour   Intake 1278.33 ml   Output 360 ml   Net 918.33 ml       Diet NPO    Invasive Monitoring           Physical Exam   Physical Exam  Skin:     General: Skin is warm and dry.      Capillary Refill: Capillary refill takes less than 2 seconds.   HENT:      Head: Normocephalic and atraumatic.   Cardiovascular:      Rate and Rhythm: Normal rate and regular rhythm.      Pulses: Normal pulses.      Heart sounds: Normal heart sounds.   Musculoskeletal:      Right lower leg: No edema.      Left lower leg: No edema.   Abdominal: General: There is no distension.       Palpations: Abdomen is soft.      Comments: Diminished bowel sounds   Constitutional:       Appearance: He is well-developed and well-nourished.   Pulmonary:      Breath sounds: Normal breath sounds.      Comments: Mechanical breath sounds  Neurological:        Corneal reflex present, cough reflex and gag reflex intact.      Comments: While off sedation, follows commands   Genitourinary/Anorectal:  Marcano present.        Diagnostic Studies        Lab Results: I have reviewed the following results:     Medications:  Scheduled PRN   chlorhexidine, 15 mL, Q12H ROSY  enoxaparin, 40 mg, Daily          Continuous    lactated ringers, 100 mL/hr, Last Rate: 100 mL/hr (03/13/25 2319)  propofol, 5-50 mcg/kg/min, Last Rate: 15 mcg/kg/min (03/14/25 4270)         Labs:   CBC    Recent Labs     03/13/25 2134   WBC 8.32   HGB 11.1*   HCT 33.8*        BMP    Recent Labs     03/13/25 2134   SODIUM 139   K 3.9      CO2 30   AGAP 5   BUN 12   CREATININE 0.80   CALCIUM 8.6       Coags    Recent Labs     03/13/25 2134   INR 1.22*   PTT 38*        Additional Electrolytes  No recent results       Blood Gas    No recent results  Recent Labs     03/14/25  0024   PHVEN 7.403*   CKY6RWG 42.1   PO2VEN 25.9*   IUN8KKL 25.7   BEVEN 0.8   P6NIAAZ 48.4*    LFTs  Recent Labs     03/13/25 2134   ALT 20   AST 24   ALKPHOS 42   ALB 3.8   TBILI 0.73       Infectious  No recent results  Glucose  Recent Labs     03/13/25 2134   GLUC 113

## 2025-03-14 NOTE — ASSESSMENT & PLAN NOTE
Intentional vs. Unintentional - in the context of father recently completing suicide   Known hx of polysubstance abuse  Found unresponsive in public restroom 3/13, did not respond to 8mg Narcan  UDS + fentanyl, benzos, barbiturates, amphetamines  Intubated in  ED  Hold sedation   Q2 neuro checks  No further antidotes give polysubstance overdose  Consider psych consult once medically optimized

## 2025-03-14 NOTE — ASSESSMENT & PLAN NOTE
Found to be barbiturate, opioid, amphetamine, benzo positive on urine drug screening in the ED  Patient currently alert and oriented to self, place, situation, and date  Patient speaking with slow elder, unclear if due to underlying depression versus resolving toxic metabolic encephalopathy  Continue to monitor

## 2025-03-14 NOTE — CASE MANAGEMENT
Case Management Discharge Planning Note    Patient name Kapil Cordova  Location ICU 12/ICU 12 MRN [id number]  : 1985 Date 3/14/2025       Current Admission Date: 3/13/2025  Current Admission Diagnosis:Overdose   Patient Active Problem List    Diagnosis Date Noted Date Diagnosed    Toxic metabolic encephalopathy 2025     Overdose 2025     Acute respiratory failure with hypoxia and hypercapnia (HCC) 2025     Elevated CK 2025     Substance abuse in remission (HCC) 2024     Vitamin D deficiency 2024     Hypercalcemia 2024     Prolonged QT interval 2024     Attention deficit disorder (ADD) 05/10/2021     Anxiety 05/10/2021     History of ADHD 2020     Hepatitis C 2020     Polysubstance abuse (HCC) 2020       LOS (days): 1  Geometric Mean LOS (GMLOS) (days):   Days to GMLOS:     OBJECTIVE:  Risk of Unplanned Readmission Score: 14.32         Current admission status: Inpatient   Preferred Pharmacy:   [facility]/pharmacy #[id number] [address] [address]  Phone: [phone] Fax: [phone]    Primary Care Provider: Reese Gomes DO    Primary Insurance: [employer]  Secondary Insurance:     DISCHARGE DETAILS:       Treatment Team Recommendation: Substance Abuse Treatment  Discharge Destination Plan:: Substance Abuse Treatment         Additional Comments: CM met with the patient briefly at the providers request. Patient was still a little shaky and tired when CM spoke with him. CM reviewed CRS and HOST roles and offered both. Patient agrees to HOST referral. CM requested DRS consult. CM department will continue to follow through hospital discharge.

## 2025-03-14 NOTE — ED PROCEDURE NOTE
Procedure  Central Line    Date/Time: 3/13/2025 9:47 PM    Performed by: Eloisa Holden PA-C  Authorized by: Eloisa Holden PA-C    Patient location:  ED  Other Assisting Provider: Yes (comment) ()    Consent:     Consent obtained:  Emergent situation    Consent given by:  Patient  Universal protocol:     Patient identity confirmed:  Arm band  Pre-procedure details:     Hand hygiene: Hand hygiene performed prior to insertion      Sterile barrier technique: All elements of maximal sterile technique followed      Skin preparation:  2% chlorhexidine    Skin preparation agent: Skin preparation agent completely dried prior to procedure    Indications:     Central line indications: medications requiring central line      Site selection rationale:  Ease of access  Sedation:     Sedation type: patient unresponsive and sedated post intubation.  Anesthesia (see MAR for exact dosages):     Anesthesia method:  None  Procedure details:     Location:  Left femoral    Vessel type: vein      Laterality:  Left    Patient position:  Flat    Catheter type:  Triple lumen    Catheter size:  7 Fr    Landmarks identified: yes      Ultrasound guidance: yes      Ultrasound image availability:  Not obtained due to urgency    Number of attempts:  1    Successful placement: yes      Catheter tip vessel location: iliac vein    Post-procedure details:     Post-procedure:  Dressing applied    Assessment:  Blood return through all ports and free fluid flow    Post-procedure complications: none      Patient tolerance of procedure:  Tolerated well, no immediate complications                   Eloisa Holden PA-C  03/13/25 4882

## 2025-03-14 NOTE — H&P
H&P - Critical Care/ICU   Name: Kapil Cordova 39 y.o. male I MRN: [id number]  Unit/Bed#: ICU 12 I Date of Admission: 3/13/2025   Date of Service: 3/13/2025 I Hospital Day: 0       Assessment & Plan  Overdose  Intentional vs. Unintentional - in the context of father recently completing suicide   Known hx of polysubstance abuse  Found unresponsive in public restroom 3/13, did not respond to 8mg Narcan  UDS + fentanyl, benzos, barbiturates, amphetamines  Intubated in  ED  Hold sedation   Q2 neuro checks  No further antidotes give polysubstance overdose  Consider psych consult once medically optimized   Acute respiratory failure with hypoxia and hypercapnia (HCC)  Due to overdose  Intubated for airway protection and unresponsiveness in  ED  AC/VC 18/500/6  8.0 ETT, 25 at lip, satisfactory positioning on CXR  Hold sedation  Check VBG to assess vent settings  Daily SAT/SBT    Polysubstance abuse (HCC)  Known hx of using methamphetamines, THC, mushrooms, benzos, opioids   Has had prior hospitalizations for overdose  Is prescribed adderall and clonazepam outpt  PDMP reviewed 3/13  Recently finished rehabilitation   Elevated CK  Unknown down time prior to being found in public restroom   CK on admissino  Trend   LR @ 100  Monitor urine output   Disposition: Critical care    History of Present Illness   Kapil Cordova is a 39 y.o.M hx of polysubstance use, anxiety, depression who presented unresponsive to  ED due to overdose, was intubated, and transferred to [facility] ICU for further management. He was reportedly found down in public restroom 3/13. On review of today's PCP note, father recently completed suicide. Pt recently completed rehab program. Did not respond to narcan 8mg pre-hospital, was intubated for airway protection and L fem CVC placed in ED. UDS + amphetamines, fentanyl, benzos, barbiturates (received versed in  ED). CTH negative. On arrival to [facility] ICU sedation was stopped, pt was unresponsive to painful  stimuli, pupils pinpoint and non reactive, weak cough/gag. No further antidotes were given due to polysubstance ingestion.     History obtained from chart review.  Review of Systems: Review of Systems not obtainable due to Clinical Condition    Historical Information   Past Medical History:  No date: Heroin abuse (HCC) No past surgical history on file.   Current Outpatient Medications   Medication Instructions    amphetamine-dextroamphetamine (ADDERALL) 20 mg tablet 20 mg, Oral, 2 times daily    Cholecalciferol (Vitamin D3) 1000 units CAPS 1 capsule, Oral, Daily    clonazePAM (KLONOPIN) 2 mg, Oral, 3 times daily    naloxone (NARCAN) 4 mg/0.1 mL nasal spray Administer 1 spray into a nostril. If no response after 2-3 minutes, give another dose in the other nostril using a new spray.    No Known Allergies   Social History     Tobacco Use    Smoking status: Former     Types: Cigarettes    Smokeless tobacco: Never   Vaping Use    Vaping status: Every Day    Substances: Nicotine   Substance Use Topics    Alcohol use: Yes     Comment: a beer a month    Drug use: Yes     Types: Marijuana    Family History   Problem Relation Age of Onset    No Known Problems Mother     Completed Suicide  Father           Objective :                   Vitals I/O      Most Recent Min/Max in 24hrs   Temp   Temp  Min: 96.1 °F (35.6 °C)  Max: 97.7 °F (36.5 °C)   Pulse   Pulse  Min: 57  Max: 116   Resp   Resp  Min: 0  Max: 16   BP   BP  Min: 107/83  Max: 128/56   O2 Sat   SpO2  Min: 95 %  Max: 100 %    No intake or output data in the 24 hours ending 03/13/25 9763    Diet NPO    Invasive Monitoring           Physical Exam   Physical Exam  Eyes:      Comments: Pupils pinpoint, nonreactive   Skin:     General: Skin is warm and dry.      Capillary Refill: Capillary refill takes less than 2 seconds.   HENT:      Head: Normocephalic and atraumatic.   Cardiovascular:      Rate and Rhythm: Normal rate and regular rhythm.      Pulses: Normal pulses.       Heart sounds: Normal heart sounds.   Musculoskeletal:      Right lower leg: No edema.      Left lower leg: No edema.   Abdominal: General: Bowel sounds are normal. There is no distension.      Palpations: Abdomen is soft.   Constitutional:       Appearance: He is well-developed and well-nourished.   Pulmonary:      Breath sounds: Normal breath sounds.      Comments: Mechanical breath sounds  Neurological:      Mental Status: He is unresponsive.        Cough reflex and gag reflex intact.      Comments: Does not withdraw to pain   Genitourinary/Anorectal:  Marcano present.        Diagnostic Studies        Lab Results: I have reviewed the following results:     Medications:  Scheduled PRN   chlorhexidine, 15 mL, Q12H ROSY  [START ON 3/14/2025] enoxaparin, 40 mg, Daily          Continuous    lactated ringers, 100 mL/hr         Labs:   CBC    Recent Labs     03/13/25 2134   WBC 8.32   HGB 11.1*   HCT 33.8*        BMP    Recent Labs     03/13/25 2134   SODIUM 139   K 3.9      CO2 30   AGAP 5   BUN 12   CREATININE 0.80   CALCIUM 8.6       Coags    Recent Labs     03/13/25 2134   INR 1.22*   PTT 38*        Additional Electrolytes  No recent results       Blood Gas    No recent results  Recent Labs     03/13/25 2134   PHVEN 7.304   AQU1NQJ 50.1*   PO2VEN 40.9   KTH1EDP 24.3   BEVEN -2.4   G7NENME 70.8    LFTs  Recent Labs     03/13/25 2134   ALT 20   AST 24   ALKPHOS 42   ALB 3.8   TBILI 0.73       Infectious  No recent results  Glucose  Recent Labs     03/13/25 2134   GLUC 113

## 2025-03-14 NOTE — RESPIRATORY THERAPY NOTE
Pt received from [facility] via EMS. Upon assessment pt placed on ventilator with noted settings.        03/13/25 2343   Respiratory Assessment   Assessment Type Assess only   General Appearance Sedated   Respiratory Pattern Assisted   Chest Assessment Chest expansion symmetrical   Bilateral Breath Sounds Clear   Cough Strong   Suction ET Tube   O2 Device Ventilator   Vent Information   Vent type Issa G5   Issa Vent Mode (S)CMV   $ Vent Charge-INITIAL Yes   Ventilator Start Yes   $ Pulse Oximetry Spot Check Charge Completed   (S)CMV Settings   Resp Rate (BPM) 18 BPM   VT (mL) 500 mL   FIO2 (%) 40 %   PEEP (cmH2O) 6 cmH2O   Insp Time (%) 1 %   Flow Trigger (LPM) 2   Humidification Heater   Heater Temperature (Set) 98.6 °F (37 °C)   Pause Time (%) 0 %   (S)CMV Actuals   Resp Rate (BPM) 18 BPM   VT (mL) 413   MV 7.4   MAP (cmH2O) 8.4 cmH2O   Peak Pressure (cmH2O) 14 cmH2O   I:E Ratio (Obs) 1:2.3   Insp Resistance 7   Heater Temperature (Obs) 98.6 °F (37 °C)   Static Compliance (mL/cmH20) 78.3 mL/cmH2O   (S)CMV Alarms   High Peak Pressure (cmH2O) 40   Low Pressure (cmH2O) 5 cm H2O   High Resp Rate (BPM) 40 BPM   Low Resp Rate (BPM) 8 BPM   High MV (L/min) 20 L/min   Low MV (L/min) 4 L/min   High VT (mL) 1000 mL   Low VT (mL) 250 mL   Leak (%) 0 %   Apnea Time (s) 20 S   Maintenance   Alarm (pink) cable attached No   Resuscitation bag with peep valve at bedside Yes   Water bag changed No   Circuit changed No   Daily Screen   Patient safety screen outcome: Failed   Not Ready for Weaning due to: Underline problem not resolved   IHI Ventilator Associated Pneumonia Bundle   Daily Assessment of Readiness to Extubate Not applicable (Comment)   Head of Bed Elevated HOB 30   ETT  8 mm   Placement Date/Time: 03/13/25 2005   Mask Ventilation: Ventilated by mask (1)  Preoxygenated: Yes  Technique: Flexible fiberscope  Tube Size: 8 mm  Blade Size: 3  Location: Oral  Insertion: Atraumatic  Insertion attempts: 1   Placement Verification: Au...   Secured at (cm) 26   Measured from Lips   Secured Location Center   Secured by Commercial tube archer   Site Condition Cool   Cuff Pressure (color) Green   HI-LO Suction  Capped

## 2025-03-14 NOTE — ASSESSMENT & PLAN NOTE
Known hx of using methamphetamines, THC, mushrooms, benzos, opioids   Has had prior hospitalizations for overdose  Is prescribed adderall and clonazepam outpt  PDMP reviewed 3/13  Recently finished rehabilitation

## 2025-03-14 NOTE — PLAN OF CARE
Problem: Prexisting or High Potential for Compromised Skin Integrity  Goal: Skin integrity is maintained or improved  Description: INTERVENTIONS:  - Identify patients at risk for skin breakdown  - Assess and monitor skin integrity  - Assess and monitor nutrition and hydration status  - Monitor labs   - Assess for incontinence   - Turn and reposition patient  - Assist with mobility/ambulation  - Relieve pressure over bony prominences  - Avoid friction and shearing  - Provide appropriate hygiene as needed including keeping skin clean and dry  - Evaluate need for skin moisturizer/barrier cream  - Collaborate with interdisciplinary team   - Patient/family teaching  - Consider wound care consult   Outcome: Progressing     Problem: NEUROSENSORY - ADULT  Goal: Achieves stable or improved neurological status  Description: INTERVENTIONS  - Monitor and report changes in neurological status  - Monitor vital signs such as temperature, blood pressure, glucose, and any other labs ordered   - Initiate measures to prevent increased intracranial pressure  - Monitor for seizure activity and implement precautions if appropriate      Outcome: Progressing  Goal: Remains free of injury related to seizures activity  Description: INTERVENTIONS  - Maintain airway, patient safety  and administer oxygen as ordered  - Monitor patient for seizure activity, document and report duration and description of seizure to physician/advanced practitioner  - If seizure occurs,  ensure patient safety during seizure  - Reorient patient post seizure  - Seizure pads on all 4 side rails  - Instruct patient/family to notify RN of any seizure activity including if an aura is experienced  - Instruct patient/family to call for assistance with activity based on nursing assessment  - Administer anti-seizure medications if ordered    Outcome: Progressing  Goal: Achieves maximal functionality and self care  Description: INTERVENTIONS  - Monitor swallowing and  airway patency with patient fatigue and changes in neurological status  - Encourage and assist patient to increase activity and self care.   - Encourage visually impaired, hearing impaired and aphasic patients to use assistive/communication devices  Outcome: Progressing     Problem: RESPIRATORY - ADULT  Goal: Achieves optimal ventilation and oxygenation  Description: INTERVENTIONS:  - Assess for changes in respiratory status  - Assess for changes in mentation and behavior  - Position to facilitate oxygenation and minimize respiratory effort  - Oxygen administered by appropriate delivery if ordered  - Initiate smoking cessation education as indicated  - Encourage broncho-pulmonary hygiene including cough, deep breathe, Incentive Spirometry  - Assess the need for suctioning and aspirate as needed  - Assess and instruct to report SOB or any respiratory difficulty  - Respiratory Therapy support as indicated  Outcome: Progressing     Problem: SAFETY,RESTRAINT: NV/NON-SELF DESTRUCTIVE BEHAVIOR  Goal: Remains free of harm/injury (restraint for non violent/non self-detsructive behavior)  Description: INTERVENTIONS:  - Instruct patient/family regarding restraint use   - Assess and monitor physiologic and psychological status   - Provide interventions and comfort measures to meet assessed patient needs   - Identify and implement measures to help patient regain control  - Assess readiness for release of restraint   Outcome: Progressing  Goal: Returns to optimal restraint-free functioning  Description: INTERVENTIONS:  - Assess the patient's behavior and symptoms that indicate continued need for restraint  - Identify and implement measures to help patient regain control  - Assess readiness for release of restraint   Outcome: Progressing

## 2025-03-14 NOTE — ASSESSMENT & PLAN NOTE
Due to overdose  Intubated for airway protection and unresponsiveness in  ED  AC/VC 18/500/6  8.0 ETT, 25 at lip, satisfactory positioning on CXR  Hold sedation  Check VBG to assess vent settings  Daily SAT/SBT

## 2025-03-14 NOTE — UTILIZATION REVIEW
Initial Clinical Review    Admission: Date/Time/Statement:   Admission Orders (From admission, onward)       Ordered        03/13/25 3439  Inpatient Admission  Once                          Orders Placed This Encounter   Procedures    Inpatient Admission     Standing Status:   Standing     Number of Occurrences:   1     Level of Care:   Critical Care [15]     Estimated length of stay:   More than 2 Midnights     Certification:   I certify that inpatient services are medically necessary for this patient for a duration of greater than two midnights. See H&P and MD Progress Notes for additional information about the patient's course of treatment.     ED Arrival Information       Patient not seen in ED                           Initial Presentation: 39 y.o. male transferred from [facility] to [facility] ICU due to acute AMS, OD - Intentional vs. Unintentional, acute respiratory failure w hypoxia/hypercapnia, elevated CK, polysubstance abuse  PMH  Pt recently completed rehab program , polysubstance use, anxiety, depression, father recently completing suicide,    Did not respond to narcan 8 mg pre-hospital, @  [facility] intubated for airway protection, + hypotension, Possible signs of seizure  and L fem CVC placed in ED.   UDS + amphetamines, fentanyl, benzos, barbiturates (received versed in  ED).   CTH negative. Sedation w IV propofol GTT    Exam  SLA ICU sedation stopped,  unresponsive to painful stimuli, pupils pinpoint and non reactive, weak cough/gag.  Labs . PLAN ICU management Q 2 hr neuro checks, No further antidotes were given due to polysubstance ingestion. Psych consult once medically optimized; mechanical vent obtain VBG, daily SAT/ SBT, IVF @ 100 ML/HR, trend CK, I/O  Date: 3/14/2025   Day 2: ICU  AMS Improving overnight, woke up and followed commands off sedation  Propofol added, SAT  successful extubated to room air, remove nieves after extubation, CVL DCd. Psych  consult once medically optimized  IP CM consult. Follow UO    ED Treatment-Medication Administration - No Administrations Displayed (No Start Event Found)       None            Scheduled Medications:  amphetamine-dextroamphetamine, 20 mg, Oral, BID  chlorhexidine, 15 mL, Mouth/Throat, Q12H ROSY  clonazePAM, 2 mg, Oral, TID  enoxaparin, 40 mg, Subcutaneous, Daily  magnesium sulfate, 4 g, Intravenous, Once      Continuous IV Infusions:  Medications 03/13 03/14   lactated ringers infusion  Rate: 100 mL/hr Dose: 100 mL/hr  Freq: Continuous Route: IV  Last Dose: Stopped (03/14/25 1002)  Start: 03/13/25 2300 End: 03/14/25 1001    2319      1001-D/C'd  1002 [C]         2047-D/C'd           propofol (DIPRIVAN) 1000 mg in 100 mL infusion (premix)  Rate: 2.2-22.4 mL/hr Dose: 5-50 mcg/kg/min  Weight Dosing Info: 74.7 kg  Freq: Titrated Route: IV  Last Dose: Stopped (03/14/25 0812)  Start: 03/14/25 0200 End: 03/14/25 0744        0206     0320     0504     0527     0540     0602     0744-D/C'd  0812 [C]           PRN Meds:     ED Triage Vitals   Temperature Pulse Respirations Blood Pressure SpO2 Pain Score   03/13/25 2315 03/13/25 2300 03/13/25 2300 03/13/25 2300 03/13/25 2300 03/14/25 0715   (!) 96.1 °F (35.6 °C) 60 (!) 23 109/85 100 % Med Not Given for Pain - for MAR use only     Weight (last 2 days)       Date/Time Weight    03/14/25 0537 75.1 (165.57)    03/14/25 0300 75.1 (165.57)            Vital Signs (last 3 days)       Date/Time Temp Pulse Resp BP MAP (mmHg) SpO2 O2 Device Patient Position - Orthostatic VS Dee Coma Scale Score Pain    03/14/25 1100 98.6 °F (37 °C) -- -- -- -- -- -- -- -- --    03/14/25 1000 99.3 °F (37.4 °C) 74 16 105/65 77 99 % None (Room air) -- -- --    03/14/25 0930 -- -- -- -- -- -- -- -- 15 No Pain    03/14/25 0900 99 °F (37.2 °C) 72 7 98/55 68 99 % -- -- -- --    03/14/25 0800 97.9 °F (36.6 °C) 74 15 108/71 84 100 % Ventilator -- 10 --    03/14/25 0750 98.6 °F (37 °C) 72 8 90/57 66 100 % -- --  -- --    03/14/25 0747 -- -- -- -- -- 100 % -- -- -- --    03/14/25 0736 -- -- -- -- -- -- -- -- -- Med Not Given for Pain - for MAR use only    03/14/25 0715 -- -- -- -- -- -- -- -- -- Med Not Given for Pain - for MAR use only    03/14/25 0600 99 °F (37.2 °C) 62 14 109/78 88 100 % -- -- 7 --    03/14/25 0500 98.6 °F (37 °C) 54 17 106/74 86 100 % -- -- -- --    03/14/25 0414 -- -- -- -- -- 100 % -- -- -- --    03/14/25 0400 98.2 °F (36.8 °C) 56 18 103/72 84 100 % -- -- 3 --    03/14/25 0315 97.2 °F (36.2 °C) 56 18 105/77 88 100 % -- -- -- --    03/14/25 0300 97.2 °F (36.2 °C) 54 18 106/77 88 100 % -- -- -- --    03/14/25 0245 96.8 °F (36 °C) 52 18 106/68 87 100 % -- -- -- --    03/14/25 0230 96.8 °F (36 °C) 52 18 109/80 90 100 % -- -- -- --    03/14/25 0215 96.4 °F (35.8 °C) 52 18 -- -- 100 % -- -- -- --    03/14/25 0200 96.1 °F (35.6 °C) 50 18 114/80 93 100 % -- -- 7 --    03/14/25 0045 95.4 °F (35.2 °C) 50 18 112/84 95 100 % -- -- -- --    03/14/25 0030 95.4 °F (35.2 °C) 50 18 113/86 96 100 % -- -- -- --    03/14/25 0015 95.4 °F (35.2 °C) 50 18 112/84 93 100 % -- -- -- --    03/14/25 0000 95.7 °F (35.4 °C) 50 18 112/86 96 100 % Ventilator Lying 3 --    03/13/25 2345 95.4 °F (35.2 °C) 62 19 115/92 101 100 % -- -- -- --    03/13/25 2330 95.7 °F (35.4 °C) 52 18 112/88 96 100 % -- -- -- --    03/13/25 2315 96.1 °F (35.6 °C) 54 18 112/88 96 100 % -- -- -- --    03/13/25 2300 -- 60 23 109/85 94 100 % -- -- 3 --              Pertinent Labs/Diagnostic Test Results:   Radiology:  No orders to display     Cardiology:  ECG 12 lead   Final Result by Crys Samaniego MD (03/14 0352)   Sinus bradycardia   Otherwise normal ECG   When compared with ECG of 15-May-2019 19:48,   Vent. rate has decreased by  62 bpm   Confirmed by Crys Samaniego ([id number]) on 3/14/2025 7:30:30 AM        GI:  No orders to display           Results from last 7 days   Lab Units 03/14/25  0424 03/13/25  2134   WBC Thousand/uL 7.74 8.32   HEMOGLOBIN g/dL 11.2*  "11.1*   HEMATOCRIT % 32.7* 33.8*   PLATELETS Thousands/uL 160 160   TOTAL NEUT ABS Thousands/µL 4.17 4.88         Results from last 7 days   Lab Units 03/14/25 0424 03/13/25 2134   SODIUM mmol/L 140 139   POTASSIUM mmol/L 3.6 3.9   CHLORIDE mmol/L 106 104   CO2 mmol/L 29 30   ANION GAP mmol/L 5 5   BUN mg/dL 12 12   CREATININE mg/dL 0.75 0.80   EGFR ml/min/1.73sq m 117 114   CALCIUM mg/dL 8.9 8.6   CALCIUM, IONIZED mmol/L 1.14  --    MAGNESIUM mg/dL 1.7*  --    PHOSPHORUS mg/dL 3.0  --      Results from last 7 days   Lab Units 03/14/25 0424 03/13/25 2134   AST U/L 23 24   ALT U/L 20 20   ALK PHOS U/L 37 42   TOTAL PROTEIN g/dL 5.9* 5.9*   ALBUMIN g/dL 3.7 3.8   TOTAL BILIRUBIN mg/dL 0.74 0.73     Results from last 7 days   Lab Units 03/13/25  1940   POC GLUCOSE mg/dl 105     Results from last 7 days   Lab Units 03/14/25 0424 03/13/25  2134   GLUCOSE RANDOM mg/dL 91 113             No results found for: \"BETA-HYDROXYBUTYRATE\"       Results from last 7 days   Lab Units 03/14/25  0024 03/13/25 2134   PH QUIANA  7.403* 7.304   PCO2 QUIANA mm Hg 42.1 50.1*   PO2 QUIANA mm Hg 25.9* 40.9   HCO3 QUIANA mmol/L 25.7 24.3   BASE EXC QUIANA mmol/L 0.8 -2.4   O2 CONTENT QUIANA ml/dL 8.6 12.0   O2 HGB, VENOUS % 48.4* 70.8         Results from last 7 days   Lab Units 03/14/25 0424 03/13/25 2134   CK TOTAL U/L 304 409*             Results from last 7 days   Lab Units 03/14/25 0424 03/13/25 2134   PROTIME seconds 14.9 15.6*   INR  1.16 1.22*   PTT seconds  --  38*             Results from last 7 days   Lab Units 03/13/25 2134   LACTIC ACID mmol/L 0.9           Results from last 7 days   Lab Units 03/13/25 2134   AMPH/METH  Positive*   BARBITURATE UR  Positive*   BENZODIAZEPINE UR  Positive*   COCAINE UR  Negative   METHADONE URINE  Negative   OPIATE UR  Negative   PCP UR  Negative   THC UR  Negative     Results from last 7 days   Lab Units 03/13/25 2134   ETHANOL LVL mg/dL <10   ACETAMINOPHEN LVL ug/mL <2*   SALICYLATE LVL mg/dL <5       "     Past Medical History:   Diagnosis Date    Heroin abuse (HCC)      Present on Admission:   Polysubstance abuse (HCC)      Admitting Diagnosis: Overdose [T50.901A]  Age/Sex: 39 y.o. male    Network Utilization Review Department  ATTENTION: Please call with any questions or concerns to [phone] and carefully listen to the prompts so that you are directed to the right person. All voicemails are confidential.   For Discharge needs, contact Care Management DC Support Team at [phone] opt. [phone]  Send all requests for admission clinical reviews, approved or denied determinations and any other requests to dedicated fax number below belonging to the campus where the patient is receiving treatment. List of dedicated fax numbers for the Facilities:  FACILITY NAME UR FAX NUMBER   ADMISSION DENIALS (Administrative/Medical Necessity) [phone]   DISCHARGE SUPPORT TEAM (NETWORK) [phone]   PARENT CHILD HEALTH (Maternity/NICU/Pediatrics) [phone]   [facility] [phone]   [facility] [phone]   [facility] [phone]   [facility] [phone]   [facility] [phone]   [facility] [phone]   [facility] [phone]   [facility] [phone]   [facility] [phone]   [facility] [phone]   [facility] [phone]   [facility] [phone]

## 2025-03-14 NOTE — PROGRESS NOTES
Pastoral Care Progress Note    Ch entered room on routine rounds. Pt was lying in bed. Pt was visibly tearful. Ch listened and offered support. Ch remains avail.            03/14/25 1100   Clinical Encounter Type   Visited With Patient   Routine Visit Introduction

## 2025-03-14 NOTE — ASSESSMENT & PLAN NOTE
Due to overdose  Intubated for airway protection and unresponsiveness in  ED  AC/VC 18/500/6  8.0 ETT, 25 at lip, satisfactory positioning on CXR  Woke up and followed commands 3/14 early AM  Propofol targetting RASS 0 to -1  Check VBG to assess vent settings  SAT and SBT today, if passes then extubation

## 2025-03-14 NOTE — PLAN OF CARE
Problem: RESPIRATORY - ADULT  Goal: Achieves optimal ventilation and oxygenation  Description: INTERVENTIONS:  - Assess for changes in respiratory status  - Assess for changes in mentation and behavior  - Position to facilitate oxygenation and minimize respiratory effort  - Oxygen administered by appropriate delivery if ordered  - Initiate smoking cessation education as indicated  - Encourage broncho-pulmonary hygiene including cough, deep breathe, Incentive Spirometry  - Assess the need for suctioning and aspirate as needed  - Assess and instruct to report SOB or any respiratory difficulty  - Respiratory Therapy support as indicated  Outcome: Progressing     Problem: NEUROSENSORY - ADULT  Goal: Achieves stable or improved neurological status  Description: INTERVENTIONS  - Monitor and report changes in neurological status  - Monitor vital signs such as temperature, blood pressure, glucose, and any other labs ordered   - Initiate measures to prevent increased intracranial pressure  - Monitor for seizure activity and implement precautions if appropriate      Outcome: Progressing  Goal: Remains free of injury related to seizures activity  Description: INTERVENTIONS  - Maintain airway, patient safety  and administer oxygen as ordered  - Monitor patient for seizure activity, document and report duration and description of seizure to physician/advanced practitioner  - If seizure occurs,  ensure patient safety during seizure  - Reorient patient post seizure  - Seizure pads on all 4 side rails  - Instruct patient/family to notify RN of any seizure activity including if an aura is experienced  - Instruct patient/family to call for assistance with activity based on nursing assessment  - Administer anti-seizure medications if ordered    Outcome: Progressing  Goal: Achieves maximal functionality and self care  Description: INTERVENTIONS  - Monitor swallowing and airway patency with patient fatigue and changes in neurological  status  - Encourage and assist patient to increase activity and self care.   - Encourage visually impaired, hearing impaired and aphasic patients to use assistive/communication devices  Outcome: Progressing     Problem: SAFETY,RESTRAINT: NV/NON-SELF DESTRUCTIVE BEHAVIOR  Goal: Remains free of harm/injury (restraint for non violent/non self-detsructive behavior)  Description: INTERVENTIONS:  - Instruct patient/family regarding restraint use   - Assess and monitor physiologic and psychological status   - Provide interventions and comfort measures to meet assessed patient needs   - Identify and implement measures to help patient regain control  - Assess readiness for release of restraint   Outcome: Progressing  Goal: Returns to optimal restraint-free functioning  Description: INTERVENTIONS:  - Assess the patient's behavior and symptoms that indicate continued need for restraint  - Identify and implement measures to help patient regain control  - Assess readiness for release of restraint   Outcome: Progressing

## 2025-03-14 NOTE — ASSESSMENT & PLAN NOTE
Intentional vs. Unintentional - in the context of father recently completing suicide   Known hx of polysubstance abuse  Found unresponsive in public restroom 3/13, did not respond to 8mg Narcan  UDS + fentanyl, benzos, barbiturates, amphetamines  Intubated in  ED  Became alert and followed commands 3/14 early AM  Q2 neuro checks  No further antidotes give polysubstance overdose  Consider psych consult once medically optimized

## 2025-03-14 NOTE — ASSESSMENT & PLAN NOTE
Unknown down time prior to being found in public restroom   CK on admission  Trend   1L isolyte bolus, LR @ 100  Monitor urine output

## 2025-03-14 NOTE — ASSESSMENT & PLAN NOTE
Patient denies suicidal ideation, took 1 bag IV of what he believed to be heroin prior to arrival, intubated for airway protection and toxic metabolic encephalopathy, extubated on 3/14/2025.    Due to nonresponsiveness to Narcan, possible xylazine or tranq overdose or coingestion    Patient would benefit from psychiatric and CRS evaluation given timing of overdose and months of sobriety and Suboxone compliance

## 2025-03-14 NOTE — ASSESSMENT & PLAN NOTE
Patient compliant for months with home prescription  Continue home clonazepam 2 mg oral 3 times daily  No clinical indication for taper or discontinuation  Follow-up in outpatient setting

## 2025-03-14 NOTE — ASSESSMENT & PLAN NOTE
Unknown down time prior to being found in public restroom   CK on admissino  Trend   LR @ 100  Monitor urine output    F/U with CBC

## 2025-03-14 NOTE — ED NOTES
This Rn attempted to call report several times at [phone]. If need for report please call at [phone].      Renee Kincaid RN  03/13/25 2241

## 2025-03-14 NOTE — ASSESSMENT & PLAN NOTE
Known hx of using methamphetamines, THC, mushrooms, benzos, opioids   Has had prior hospitalizations for overdose  Is prescribed adderall and clonazepam outpt  PDMP reviewed 3/13  Recently finished rehabilitation   Case management consult

## 2025-03-15 VITALS
OXYGEN SATURATION: 99 % | WEIGHT: 146.39 LBS | RESPIRATION RATE: 14 BRPM | HEART RATE: 98 BPM | BODY MASS INDEX: 21 KG/M2 | TEMPERATURE: 97.9 F | SYSTOLIC BLOOD PRESSURE: 125 MMHG | DIASTOLIC BLOOD PRESSURE: 74 MMHG

## 2025-03-15 PROBLEM — J96.02 ACUTE RESPIRATORY FAILURE WITH HYPOXIA AND HYPERCAPNIA (HCC): Status: RESOLVED | Noted: 2025-03-13 | Resolved: 2025-03-15

## 2025-03-15 PROBLEM — J96.01 ACUTE RESPIRATORY FAILURE WITH HYPOXIA AND HYPERCAPNIA (HCC): Status: RESOLVED | Noted: 2025-03-13 | Resolved: 2025-03-15

## 2025-03-15 PROBLEM — G92.8 TOXIC METABOLIC ENCEPHALOPATHY: Status: RESOLVED | Noted: 2025-03-14 | Resolved: 2025-03-15

## 2025-03-15 LAB
ANION GAP SERPL CALCULATED.3IONS-SCNC: 6 MMOL/L (ref 4–13)
BASOPHILS # BLD AUTO: 0.03 THOUSANDS/ÂΜL (ref 0–0.1)
BASOPHILS NFR BLD AUTO: 0 % (ref 0–1)
BUN SERPL-MCNC: 6 MG/DL (ref 5–25)
CA-I BLD-SCNC: 1.2 MMOL/L (ref 1.12–1.32)
CALCIUM SERPL-MCNC: 9 MG/DL (ref 8.4–10.2)
CHLORIDE SERPL-SCNC: 102 MMOL/L (ref 96–108)
CO2 SERPL-SCNC: 31 MMOL/L (ref 21–32)
CREAT SERPL-MCNC: 0.71 MG/DL (ref 0.6–1.3)
EOSINOPHIL # BLD AUTO: 0.23 THOUSAND/ÂΜL (ref 0–0.61)
EOSINOPHIL NFR BLD AUTO: 3 % (ref 0–6)
ERYTHROCYTE [DISTWIDTH] IN BLOOD BY AUTOMATED COUNT: 11.8 % (ref 11.6–15.1)
GFR SERPL CREATININE-BSD FRML MDRD: 119 ML/MIN/1.73SQ M
GLUCOSE SERPL-MCNC: 87 MG/DL (ref 65–140)
HCT VFR BLD AUTO: 34.6 % (ref 36.5–49.3)
HGB BLD-MCNC: 11.8 G/DL (ref 12–17)
IMM GRANULOCYTES # BLD AUTO: 0.01 THOUSAND/UL (ref 0–0.2)
IMM GRANULOCYTES NFR BLD AUTO: 0 % (ref 0–2)
LYMPHOCYTES # BLD AUTO: 1.98 THOUSANDS/ÂΜL (ref 0.6–4.47)
LYMPHOCYTES NFR BLD AUTO: 22 % (ref 14–44)
MAGNESIUM SERPL-MCNC: 2 MG/DL (ref 1.9–2.7)
MCH RBC QN AUTO: 30.9 PG (ref 26.8–34.3)
MCHC RBC AUTO-ENTMCNC: 34.1 G/DL (ref 31.4–37.4)
MCV RBC AUTO: 91 FL (ref 82–98)
MONOCYTES # BLD AUTO: 0.67 THOUSAND/ÂΜL (ref 0.17–1.22)
MONOCYTES NFR BLD AUTO: 7 % (ref 4–12)
NEUTROPHILS # BLD AUTO: 6.25 THOUSANDS/ÂΜL (ref 1.85–7.62)
NEUTS SEG NFR BLD AUTO: 68 % (ref 43–75)
NRBC BLD AUTO-RTO: 0 /100 WBCS
PHOSPHATE SERPL-MCNC: 4 MG/DL (ref 2.7–4.5)
PLATELET # BLD AUTO: 181 THOUSANDS/UL (ref 149–390)
PMV BLD AUTO: 9.4 FL (ref 8.9–12.7)
POTASSIUM SERPL-SCNC: 3.4 MMOL/L (ref 3.5–5.3)
RBC # BLD AUTO: 3.82 MILLION/UL (ref 3.88–5.62)
SODIUM SERPL-SCNC: 139 MMOL/L (ref 135–147)
WBC # BLD AUTO: 9.17 THOUSAND/UL (ref 4.31–10.16)

## 2025-03-15 PROCEDURE — 82330 ASSAY OF CALCIUM: CPT | Performed by: NURSE PRACTITIONER

## 2025-03-15 PROCEDURE — 83735 ASSAY OF MAGNESIUM: CPT | Performed by: NURSE PRACTITIONER

## 2025-03-15 PROCEDURE — 80048 BASIC METABOLIC PNL TOTAL CA: CPT | Performed by: NURSE PRACTITIONER

## 2025-03-15 PROCEDURE — 85025 COMPLETE CBC W/AUTO DIFF WBC: CPT | Performed by: NURSE PRACTITIONER

## 2025-03-15 PROCEDURE — 84100 ASSAY OF PHOSPHORUS: CPT | Performed by: NURSE PRACTITIONER

## 2025-03-15 PROCEDURE — NC001 PR NO CHARGE: Performed by: INTERNAL MEDICINE

## 2025-03-15 PROCEDURE — 99238 HOSP IP/OBS DSCHRG MGMT 30/<: CPT | Performed by: INTERNAL MEDICINE

## 2025-03-15 RX ADMIN — CLONAZEPAM 2 MG: 0.5 TABLET ORAL at 08:21

## 2025-03-15 RX ADMIN — CHLORHEXIDINE GLUCONATE 15 ML: 1.2 SOLUTION ORAL at 08:21

## 2025-03-15 RX ADMIN — ENOXAPARIN SODIUM 40 MG: 40 INJECTION SUBCUTANEOUS at 08:21

## 2025-03-15 RX ADMIN — BUPRENORPHINE AND NALOXONE 8 MG: 8; 2 FILM BUCCAL; SUBLINGUAL at 11:28

## 2025-03-15 RX ADMIN — DEXTROAMPHETAMINE SACCHARATE, AMPHETAMINE ASPARTATE, DEXTROAMPHETAMINE SULFATE AND AMPHETAMINE SULFATE 20 MG: 2.5; 2.5; 2.5; 2.5 TABLET ORAL at 08:21

## 2025-03-15 RX ADMIN — MELATONIN 6 MG: 3 TAB ORAL at 00:00

## 2025-03-15 NOTE — ASSESSMENT & PLAN NOTE
Due to overdose  Intubated for airway protection and unresponsiveness in  ED  AC/VC 18/500/6  8.0 ETT, 25 at lip, satisfactory positioning on CXR  Woke up and followed commands 3/14 early AM  Extubated afternoon 3/14.

## 2025-03-15 NOTE — PSYCH
Behavioral Health   Name: Kapil Cordova 39 y.o. male I MRN: [id number]  Unit/Bed#: ICU 12 I Date of Admission: 3/13/2025   Date of Service: 3/15/2025 I Hospital Day: 2     Called staff a number of times to get signout. Was unable to speak with staff. Epic chat provider and nursing. Awaiting call from nursing. Tablet not ready yet for patient assessment. Will await the all clear from nursing to return to consult.

## 2025-03-15 NOTE — ASSESSMENT & PLAN NOTE
Due to polysubstance ingestion  Was intubated for airway protection.  Improving overnight, woke up and followed commands off sedation  Extubated 3/14 afternoon.   Mental status back to baseline.

## 2025-03-15 NOTE — ASSESSMENT & PLAN NOTE
Unknown down time prior to being found in public restroom   CK on admission  Trend   1L isolyte bolus, LR @ 100  Monitor urine output   Muscle compartments soft.

## 2025-03-15 NOTE — ASSESSMENT & PLAN NOTE
Known hx of using methamphetamines, THC, mushrooms, benzos, opioids   Has had prior hospitalizations for overdose  Is prescribed adderall and clonazepam outpt  PDMP reviewed 3/13  Recently finished rehabilitation.  He expressed that he would like to go to rehab  Toxicology and behavioral medicine consulted  Started on suboxone per toxicology.  Received doses x2, no evidence of withdrawal.   Case management consult -- HOST referral placed

## 2025-03-15 NOTE — CONSULTS
TeleConsultation - Behavioral Health   Name: Kapil Cordova 39 y.o. male I MRN: [id number]  Unit/Bed#: ICU 12 I Date of Admission: 3/13/2025   Date of Service: 3/15/2025 I Hospital Day: 2  Inpatient consult to Psychiatry  Consult performed by: Swati Villela MD  Consult ordered by: Norma Doe MD        Physician Requesting Consult: Laura Khanna*  Principal Problem:Overdose  Reason for Consult:  Overdose, intentional versus unintentional    Assessment & Plan   Patient is a 39-year-old male with history of substance use.  He recently was discharged from rehab.  Patient was found in a public restroom, unresponsive with UDS positive for fentanyl, benzos, barbiturates, amphetamines.  He is prescribed medication for ADHD and clonazepam (benzo).  Currently with no evidence of withdrawal and requesting to be placed back at rehab.  Patient has similar episode in 2024 and 2023 where he unintentionally overdosed on substances after getting out of rehab.  Last psychiatric hospitalization in 2020 for anxiety and depressed mood.    TREATMENT PLAN RECOMMENDATIONS:  Medications: None at this time  PRN for sleep: None at this time  PRN for agitation: None at this time, patient is, cooperative     Informed consent for the above medication has been obtained including discussion of the risks, benefits and alternatives: Yes    Disposition: The patient does not currently meet criteria for inpatient psychiatric hospitalization. They deny thoughts or plans for suicide and denies homicidal thoughts or intent. They are not unable to care for themselves due to a mental illness and/or acute psychosis. They are able to adequately participate in care planning with primary team. No criteria for an involuntary psychiatric commitment exists at this time.    Legal Status Recommendation:  Patient is requesting to be discharged once medically cleared    Multiple Antipsychotic Review: N/A    Psychotherapy/Psychoeducation:  Provided to patient based on their needs and abilities in a manner that they could understand and accommodated their learning style.    Other/Medical Work Up and/or treatment modality recommendations: Please refer patient for : Rehab for substance use    Patient Caregiver/Family Education: N/A    Follow-up: Re-consult PRN    Report regarding the above Assessment and Treatment plan was provided to: KRISTOPHER Figueroa and Nurse Lopez    History of Present Illness    Patient is a 39 y.o. male with a history of Anxiety Disorder and ADHD, Combined type who  was admitted to the medical service on 3/13/2025 due to intentional versus unintentional overdose. Psychiatric consultation was requested due to  patient's request for rehab and denies of suicidal ideation .     Patient has had a longstanding history of substance abuse.  He was in and out of rehabs in the past few years.  Patient had an unintentional overdose on substances in 2024 and 2023, right after discharge from rehab.  Patient was last seen in inpatient behavioral health in 2020 after drinking too much and having anxiety and depressive symptoms.     Psychiatric Review Of Systems:  sleep: no  appetite changes: no  weight changes: no  energy/anergy: no  interest/pleasure/anhedonia: yes  somatic symptoms: no  anxiety/panic: no  israel: no  guilty/hopeless: no  self injurious behavior/risky behavior: no    Historical Information   Past Psychiatric History:   Patient has several emergency room visits this year for overdose on benzodiazepines, fentanyl and opioids.  In 2025 patient has been to the emergency room 7+ times.  Patient is seen by family medicine team for ADHD and anxiety which she is prescribed a stimulant and clonazepam 3 times a day.  Patient medically admitted for altered mental status in April 2024 secondary to substance use.  He was found to be septic at that time.  He was treated and discharged to [facility].  Last psychiatric hospitalization 2020, patient  was aggressive and combative towards father.  As per chart review patient has history of dual diagnosis hospitalizations.  History of fleeting SI without plan or intent.  No history of self injures behavior or suicide attempts.  Has been on Suboxone and methadone in the past.  Several rehabs  Does not own a firearm.    Substance Abuse History:  Several rehabs.  Has been on methadone and Suboxone.  Has been on Adderall, Ritalin, Xanax, Klonopin.  There is discharged from rehab.  Cigarette smoker 1 pack/day.  UDS positive for benzos, barbiturates, amphetamines, fentanyl    Family Psychiatric History:   Deferred    Social History:  Patient lives alone.      Traumatic History:       Meds/Allergies   Current Facility-Administered Medications   Medication Dose Route Frequency Provider Last Rate    amphetamine-dextroamphetamine  20 mg Oral BID BRENDA Walls      [START ON 3/16/2025] buprenorphine-naloxone  16 mg Sublingual Daily Francisco Javier Oswald MD      buprenorphine-naloxone  8 mg Sublingual BID Francisco Javier Oswald MD      chlorhexidine  15 mL Mouth/Throat Q12H ROSY Bret Doyle PA-C      clonazePAM  2 mg Oral TID BRENDA Walls      enoxaparin  40 mg Subcutaneous Daily Bret Doyle PA-C      melatonin  6 mg Oral HS BRENDA Erickson        No Known Allergies    Objective :  Temp:  [98.3 °F (36.8 °C)-100 °F (37.8 °C)] 98.4 °F (36.9 °C)  HR:  [] 82  BP: ()/(52-79) 98/52  Resp:  [7-25] 15  SpO2:  [96 %-100 %] 99 %  O2 Device: None (Room air)    Mental Status Evaluation:  Appearance:  age appropriate and casually dressed   Behavior:  normal   Speech:  normal pitch and normal volume   Mood:  normal   Affect:  normal   Language: wnl   Thought Process:  goal directed and logical   Associations intact associations   Thought Content:  normal   Perceptual Disturbances: None   Risk Potential: Suicidal Ideations none  Homicidal Ideations none  Potential for Aggression No  "  Sensorium:  person, place, time/date, and situation   Cognition:  recent and remote memory grossly intact   Consciousness:  alert and awake    Attention: attention span and concentration were age appropriate   Intellect: within normal limits   Fund of Knowledge: awareness of current events:     Insight:  age appropriate and fair   Judgment: age appropriate and fair   Muscle Strength:  Muscle Tone: normal     Not formally tested     Gait/Station: Not observed   Motor Activity: no abnormal movements     Lab Results: I have reviewed the following lab results:   .     03/15/25  0438   WBC 9.17   HGB 11.8*   HCT 34.6*      SODIUM 139   K 3.4*      CO2 31   BUN 6   CREATININE 0.71   GLUC 87   CAIONIZED 1.20   MG 2.0   PHOS 4.0      Results from last 7 days   Lab Units 03/13/25 2134   BARBITURATE UR  Positive*   BENZODIAZEPINE UR  Positive*   THC UR  Negative   COCAINE UR  Negative   METHADONE URINE  Negative   OPIATE UR  Negative   PCP UR  Negative     Lipid Profile: No results found for: \"CHOLESTEROL\", \"HDL\", \"TRIG\", \"LDLCALC\", \"NONHDLC\"Thyroid Studies:   Lab Results   Component Value Date    FREET4 0.65 04/07/2024     Ammonia: No results found for: \"AMMONIA\"  Drug Levels: No results found for: \"VALPROICTOT\", \"VALPROICACID\", \"LITHIUM\", \"CARBAMAZEPIN\", \"CLOZAPINE\", \"NCLOZIP\"    Imaging Results Review: No pertinent imaging studies reviewed.  Other Study Results Review: EKG was reviewed.     Code Status: Level 1 - Full Code  Advance Directive and Living Will:      Power of :    POLST:      Screenings:   1. Nutrition Assessment (completed by Staff):      2. Pain Screening  Pain Assessment  Pain Assessment Tool: 0-10  Pain Score: 0  Pain Rating: FLACC (Rest) - Face: No particular expression or smile  Pain Rating: FLACC (Rest) - Legs: Normal position or relaxed  Pain Rating: FLACC (Rest) - Activity: Lying quietly, normal position, moves easily  Pain Rating: FLACC (Rest) - Cry: No cry (awake or " asleep)  Pain Rating: FLACC (Rest) - Consolability: Content, relaxed  Score: FLACC (Rest): 0  3. Suicide Screening  ED Crisis Suicide Risk Assessment:      C-SSRS Screening (Nursing Assessment - recent):    C-SSRS Screening (Nursing Assessment - since last contact):      C-SSRS - Screen  1. In the last month have you wished you were dead or wished you could go to sleep and not wake up? NO  2. In the last month, have you actually had thoughts about killing yourself?  NO  If Yes to question 2, ask questions 3,4,5, and 6.  If No to question 2, skip to question 6  3. Have you been thinking about how you might do this? _   a. If Yes  - describe frequency, intensity and duration:_  4. Have you had these thoughts and had some intention of acting on them?_   a. If yes - describe the extent to which the patient expects to carry out the plan an believes the plan to be lethal or self-injurious: _  5. Have you started to work out or worked out the details of how to kill yourself? Did you intend to carry out this plan?_   a. If yes - describe timing, location, lethality, access to means, preparatory acts: _  Always ask  Question 6  6. Have you done anything, started to do anything, or prepared to do anything to end your life in the last 3 months? NO   a. What about in your lifetime?NO   b. If yes, please describe: _   Suicide Risk Assessment completed by the Consultant: The following ratings are based on assessment at the time of the interview. Demographic risk factors include: , never , parent completed suicide . Historical Risk Factors include: history of anxiety. Recent Specific Risk Factors include: recent losses (father), unemployed. Protective Factors: no current suicidal ideation, ability to adapt to change, access to mental health treatment, being a parent, connection to own children, having a desire to live, Congregational beliefs discouraging suicide. Weapons: none. The following steps have been taken to  ensure weapons are properly secured: not applicable. Based on today's assessment, Kapil presents the following risk of harm to self: low.    Administrative Statements   VIRTUAL CARE DOCUMENTATION:     1. This service was provided via Telemedicine using Teams Virtual Rounding      2. Parties in the room with patient during teleconsult Patient only    3. Confidentiality My office door was closed     4. Participants No one else was in the room    5. Patient acknowledged consent and understanding of privacy and security of the  Telemedicine consult. I informed the patient that I have reviewed their record in Epic and presented the opportunity for them to ask any questions regarding the visit today.  The patient agreed to participate.    6. I have spent a total time of 90 minutes in caring for this patient on the day of the visit/encounter including Counseling / Coordination of care, Documenting in the medical record, Obtaining or reviewing history  , Communicating with other healthcare professionals , and motivational interviewing , not including the time spent for establishing the audio/video connection.

## 2025-03-15 NOTE — ASSESSMENT & PLAN NOTE
Behavioral medicine and toxicology consulted, appreciate recommendations.   Started on suboxone, per toxicology.  Received 2 doses, no evidence of withdrawal.

## 2025-03-15 NOTE — DISCHARGE SUMMARY
Discharge Summary - Critical Care/ICU   Name: Kapil Cordova 39 y.o. male I MRN: [id number]  Unit/Bed#: ICU 12 I Date of Admission: 3/13/2025   Date of Service: 3/15/2025 I Hospital Day: 2    Admission Date: 3/13/2025 2257  Discharge Date: 03/15/25  Admitting Diagnosis: Overdose [T50.901A]  Discharge Diagnosis:   Medical Problems       Resolved Problems  Date Reviewed: 3/15/2025          Resolved    Acute respiratory failure with hypoxia and hypercapnia (HCC) 3/15/2025     Resolved by  BRENDA Erickson    Toxic metabolic encephalopathy 3/15/2025     Resolved by  BRENDA Erickson          HPI: Patient was found unresponsive in a public bathroom.  He was given 8 mg of Narcan IV with no response.  Patient was intubated and transported to [facility] emergency department.  He was admitted to the intensive care unit where he was awake and alert the following day and subsequently extubated.  The patient was pleasant and cooperative during his stay.  He expressed a desire to get back on a antiopiate program.  He was seen by toxicology and psychiatry who deemed him medically fit for discharge.    The patient has remained hemodynamically stable he has been on room air.  He will be discharged home today.  His outpatient prescriptions have been ordered by psychiatry and toxicology.  We would direct any questions to those 2 specialties.    Procedures Performed: Patient was intubated in the field.  Extubated in the ICU.  Summary of Hospital Course:  see the HPI    Significant Findings, Care, Treatment and Services Provided: Dental overdose of unknown substance    Complications: Acute respiratory failure requiring intubation and mechanical ventilation    Condition at Discharge: good       Discharge instructions/Information to patient and family:   See After Visit Summary (AVS) for information provided to patient and family.      Provisions for Follow-Up Care:  See after visit summary for information  related to follow-up care and any pertinent home health orders.      PCP: Reese Gomes DO    Disposition: Home    Planned Readmission: No     Discharge Medications:  See after visit summary for reconciled discharge medications provided to patient and family.      Discharge Statement:  I have spent a total time of 26 minutes in caring for this patient on the day of the visit/encounter. .

## 2025-03-15 NOTE — ASSESSMENT & PLAN NOTE
Intentional vs. Unintentional - in the context of father recently completing suicide   Known hx of polysubstance abuse  Found unresponsive in public restroom 3/13, did not respond to 8mg Narcan  UDS + fentanyl, benzos, barbiturates, amphetamines  Intubated in  ED  Became alert and followed commands 3/14 early AM  Extubated afternoon 3/14  Continue q shift neuro checks   No further antidotes give polysubstance overdose  Consults to behavioral medicine and toxicology -- appreciate recommendations  Received suboxone x2 doses per toxicology recommendations -- did well.  No evidence of withdrawal  Patient expressed that he is interested in rehab.  Case management placed HOST referral.

## 2025-03-15 NOTE — PROGRESS NOTES
Progress Note - Critical Care/ICU   Name: Kapil Cordova 39 y.o. male I MRN: [id number]  Unit/Bed#: ICU 12 I Date of Admission: 3/13/2025   Date of Service: 3/15/2025 I Hospital Day: 2      Assessment & Plan  Overdose  Intentional vs. Unintentional - in the context of father recently completing suicide   Known hx of polysubstance abuse  Found unresponsive in public restroom 3/13, did not respond to 8mg Narcan  UDS + fentanyl, benzos, barbiturates, amphetamines  Intubated in  ED  Became alert and followed commands 3/14 early AM  Extubated afternoon 3/14  Continue q shift neuro checks   No further antidotes give polysubstance overdose  Consults to behavioral medicine and toxicology -- appreciate recommendations  Received suboxone x2 doses per toxicology recommendations -- did well.  No evidence of withdrawal  Patient expressed that he is interested in rehab.  Case management placed HOST referral.   Acute respiratory failure with hypoxia and hypercapnia (HCC) (Resolved: 3/15/2025)  Due to overdose  Intubated for airway protection and unresponsiveness in  ED  AC/VC 18/500/6  8.0 ETT, 25 at lip, satisfactory positioning on CXR  Woke up and followed commands 3/14 early AM  Extubated afternoon 3/14.      Polysubstance abuse (HCC)  Known hx of using methamphetamines, THC, mushrooms, benzos, opioids   Has had prior hospitalizations for overdose  Is prescribed adderall and clonazepam outpt  PDMP reviewed 3/13  Recently finished rehabilitation.  He expressed that he would like to go to rehab  Toxicology and behavioral medicine consulted  Started on suboxone per toxicology.  Received doses x2, no evidence of withdrawal.   Case management consult -- HOST referral placed   Elevated CK  Unknown down time prior to being found in public restroom   CK on admission  Trend   1L isolyte bolus, LR @ 100  Monitor urine output   Muscle compartments soft.  Toxic metabolic encephalopathy (Resolved: 3/15/2025)  Due to polysubstance  ingestion  Was intubated for airway protection.  Improving overnight, woke up and followed commands off sedation  Extubated 3/14 afternoon.   Mental status back to baseline.   Chronic prescription benzodiazepine use  Behavioral medicine and toxicology consulted, appreciate recommendations.   Opioid use disorder, severe, on maintenance therapy, dependence (HCC)  Behavioral medicine and toxicology consulted, appreciate recommendations.   Started on suboxone, per toxicology.  Received 2 doses, no evidence of withdrawal.   Disposition: Stepdown Level 1    ICU Core Measures     A: Assess, Prevent, and Manage Pain Has pain been assessed? Yes  Need for changes to pain regimen? No   B: Both SAT/SAT  N/A   C: Choice of Sedation RASS Goal: 0 Alert and Calm  Need for changes to sedation or analgesia regimen? No   D: Delirium CAM-ICU: Negative   E: Early Mobility  Plan for early mobility? Yes   F: Family Engagement Plan for family engagement today? Yes       Review of Invasive Devices:            Prophylaxis:  VTE VTE covered by:  enoxaparin, Subcutaneous, 40 mg at 03/14/25 0827       Stress Ulcer  not ordered         24 Hour Events : Overnight the patient had difficulty sleeping he requested melatonin.  He was given melatonin for sleep which helped.  He was able to sleep the rest of the night.  No other overnight events.  At this time the patient denies complaints of withdrawal.  States his Suboxone is helping him.  He denies complaints of shortness of breath, chest pain, abdominal pain, nausea, vomiting, diarrhea.  He denies dizziness or headache.  He denies difficulty with urination or defecation.  He denies lower extremity pain.  Subjective   Review of Systems: See HPI for Review of Systems    Objective :                   Vitals I/O      Most Recent Min/Max in 24hrs   Temp 98.4 °F (36.9 °C) Temp  Min: 97.9 °F (36.6 °C)  Max: 100 °F (37.8 °C)   Pulse 82 Pulse  Min: 70  Max: 106   Resp 15 Resp  Min: 7  Max: 25   BP 98/52 BP   Min: 90/57  Max: 122/77   O2 Sat 99 % SpO2  Min: 96 %  Max: 100 %      Intake/Output Summary (Last 24 hours) at 3/15/2025 0748  Last data filed at 3/15/2025 0116  Gross per 24 hour   Intake 480.72 ml   Output 3175 ml   Net -2694.28 ml       Diet Regular; Regular House    Invasive Monitoring           Physical Exam   Physical Exam  Vitals reviewed.   Eyes:      General: Vision grossly intact.      Extraocular Movements: Extraocular movements intact.      Conjunctiva/sclera: Conjunctivae normal.      Pupils: Pupils are equal, round, and reactive to light.   Skin:     General: Skin is warm and dry.      Capillary Refill: Capillary refill takes less than 2 seconds.   HENT:      Head: Normocephalic and atraumatic.      Right Ear: Hearing normal.      Left Ear: Hearing normal.      Nose:      Comments: Normal     Mouth/Throat:      Mouth: Mucous membranes are moist.   Neck:      Comments: Supple no JVD no lymphadenopathy trachea midline  Cardiovascular:      Rate and Rhythm: Normal rate and regular rhythm.      Pulses: Normal pulses.      Heart sounds: Normal heart sounds.   Musculoskeletal:         General: Normal range of motion.   Abdominal: General: Bowel sounds are normal.      Palpations: Abdomen is soft.   Constitutional:       Appearance: He is well-developed and well-nourished.   Pulmonary:      Effort: Pulmonary effort is normal.      Breath sounds: Normal breath sounds.   Neurological:      General: No focal deficit present.      Mental Status: He is alert and oriented to person, place and time. Mental status is at baseline. He is calm.      Sensory: Sensation is intact.      Motor: gross motor function is at baseline for patient. Strength full and intact in all extremities.        Corneal reflex present, cough reflex and gag reflex intact.   Genitourinary/Anorectal:  Marcano present.        Diagnostic Studies        Lab Results: I have reviewed the following results:     Medications:  Scheduled PRN    amphetamine-dextroamphetamine, 20 mg, BID  [START ON 3/16/2025] buprenorphine-naloxone, 16 mg, Daily  buprenorphine-naloxone, 8 mg, BID  chlorhexidine, 15 mL, Q12H ROSY  clonazePAM, 2 mg, TID  enoxaparin, 40 mg, Daily  melatonin, 6 mg, HS          Continuous          Labs:   CBC    Recent Labs     03/14/25  0424 03/15/25  0438   WBC 7.74 9.17   HGB 11.2* 11.8*   HCT 32.7* 34.6*    181     BMP    Recent Labs     03/14/25  0424 03/15/25  0438   SODIUM 140 139   K 3.6 3.4*    102   CO2 29 31   AGAP 5 6   BUN 12 6   CREATININE 0.75 0.71   CALCIUM 8.9 9.0       Coags    Recent Labs     03/13/25 2134 03/14/25 0424   INR 1.22* 1.16   PTT 38*  --         Additional Electrolytes  Recent Labs     03/14/25  0424 03/15/25  0438   MG 1.7* 2.0   PHOS 3.0 4.0   CAIONIZED 1.14 1.20          Blood Gas    No recent results  Recent Labs     03/14/25  0024   PHVEN 7.403*   DGX9IPX 42.1   PO2VEN 25.9*   CVL0ZLN 25.7   BEVEN 0.8   H3ZDLJZ 48.4*    LFTs  Recent Labs     03/13/25 2134 03/14/25 0424   ALT 20 20   AST 24 23   ALKPHOS 42 37   ALB 3.8 3.7   TBILI 0.73 0.74       Infectious  No recent results  Glucose  Recent Labs     03/13/25  2134 03/14/25  0424 03/15/25  0438   GLUC 113 91 87

## 2025-03-17 ENCOUNTER — PATIENT OUTREACH (OUTPATIENT)
Dept: CASE MANAGEMENT | Facility: OTHER | Age: 40
End: 2025-03-17

## 2025-03-17 ENCOUNTER — TRANSITIONAL CARE MANAGEMENT (OUTPATIENT)
Age: 40
End: 2025-03-17

## 2025-03-17 NOTE — UTILIZATION REVIEW
NOTIFICATION OF ADMISSION DISCHARGE   This is a Notification of Discharge from [facility]. Please be advised that this patient has been discharge from our facility. Below you will find the admission and discharge date and time including the patient’s disposition.   UTILIZATION REVIEW CONTACT:  Nava Lundy  Utilization   Network Utilization Review Department  Phone: [phone] x carefully listen to the prompts. All voicemails are confidential.  Email: [email or web address]     ADMISSION INFORMATION  PRESENTATION DATE: 3/13/2025 10:57 PM  OBERVATION ADMISSION DATE: N/A  INPATIENT ADMISSION DATE: 3/13/25 10:57 PM   DISCHARGE DATE: 3/15/2025 11:50 AM   DISPOSITION:Home/Self Care    Network Utilization Review Department  ATTENTION: Please call with any questions or concerns to [phone] and carefully listen to the prompts so that you are directed to the right person. All voicemails are confidential.   For Discharge needs, contact Care Management DC Support Team at [phone] opt. 2  Send all requests for admission clinical reviews, approved or denied determinations and any other requests to dedicated fax number below belonging to the campus where the patient is receiving treatment. List of dedicated fax numbers for the Facilities:  FACILITY NAME UR FAX NUMBER   ADMISSION DENIALS (Administrative/Medical Necessity) [phone]   DISCHARGE SUPPORT TEAM ([facility]) [phone]   PARENT CHILD HEALTH (Maternity/NICU/Pediatrics) [phone]   [facility] [phone]   [facility] [phone]   [facility] [phone]   [facility] [phone]   [facility] [phone]   [facility] [phone]   [facility] [phone]   [facility] [phone]    [facility] [phone]   [facility] [phone]   General acute hospital [phone]   [facility] [phone]

## 2025-03-17 NOTE — PROGRESS NOTES
HR[facility] referral received. Chart reviewed.  Patient was admitted to [facility] 3/13-3/15 with an overdose, acute respiratory failure with hypoxia and  toxic metabolic encephalopathy.    I called both numbers listed for Kapil and there was no answer or voicemail at either number.

## 2025-03-18 ENCOUNTER — APPOINTMENT (EMERGENCY)
Dept: CT IMAGING | Facility: HOSPITAL | Age: 40
DRG: 053 | End: 2025-03-18
Payer: COMMERCIAL

## 2025-03-18 ENCOUNTER — HOSPITAL ENCOUNTER (INPATIENT)
Facility: HOSPITAL | Age: 40
LOS: 2 days | Discharge: HOME | DRG: 053 | End: 2025-03-20
Attending: EMERGENCY MEDICINE | Admitting: INTERNAL MEDICINE
Payer: COMMERCIAL

## 2025-03-18 ENCOUNTER — PATIENT OUTREACH (OUTPATIENT)
Dept: CASE MANAGEMENT | Facility: OTHER | Age: 40
End: 2025-03-18

## 2025-03-18 DIAGNOSIS — R29.90 STROKE-LIKE SYMPTOM: ICD-10-CM

## 2025-03-18 DIAGNOSIS — I63.9 CVA (CEREBRAL VASCULAR ACCIDENT) (HCC): ICD-10-CM

## 2025-03-18 DIAGNOSIS — F19.10 DRUG ABUSE (HCC): ICD-10-CM

## 2025-03-18 DIAGNOSIS — R47.01 APHASIA: Primary | ICD-10-CM

## 2025-03-18 DIAGNOSIS — R29.898 WEAKNESS OF LEFT FOOT: ICD-10-CM

## 2025-03-18 PROBLEM — E87.6 HYPOKALEMIA: Status: ACTIVE | Noted: 2025-03-18

## 2025-03-18 LAB
2HR DELTA HS TROPONIN: 2 NG/L
ANION GAP SERPL CALCULATED.3IONS-SCNC: 8 MMOL/L (ref 4–13)
APTT PPP: 33 SECONDS (ref 23–34)
BUN SERPL-MCNC: 13 MG/DL (ref 5–25)
CALCIUM SERPL-MCNC: 9.3 MG/DL (ref 8.4–10.2)
CARDIAC TROPONIN I PNL SERPL HS: 17 NG/L (ref ?–50)
CARDIAC TROPONIN I PNL SERPL HS: 19 NG/L (ref ?–50)
CHLORIDE SERPL-SCNC: 105 MMOL/L (ref 96–108)
CO2 SERPL-SCNC: 30 MMOL/L (ref 21–32)
CREAT SERPL-MCNC: 0.71 MG/DL (ref 0.6–1.3)
ERYTHROCYTE [DISTWIDTH] IN BLOOD BY AUTOMATED COUNT: 11.9 % (ref 11.6–15.1)
GFR SERPL CREATININE-BSD FRML MDRD: 119 ML/MIN/1.73SQ M
GLUCOSE SERPL-MCNC: 131 MG/DL (ref 65–140)
GLUCOSE SERPL-MCNC: 143 MG/DL (ref 65–140)
HCT VFR BLD AUTO: 32.2 % (ref 36.5–49.3)
HGB BLD-MCNC: 10.9 G/DL (ref 12–17)
INR PPP: 1.15 (ref 0.85–1.19)
MCH RBC QN AUTO: 30.5 PG (ref 26.8–34.3)
MCHC RBC AUTO-ENTMCNC: 33.9 G/DL (ref 31.4–37.4)
MCV RBC AUTO: 90 FL (ref 82–98)
PLATELET # BLD AUTO: 217 THOUSANDS/UL (ref 149–390)
PMV BLD AUTO: 9.6 FL (ref 8.9–12.7)
POTASSIUM SERPL-SCNC: 3.3 MMOL/L (ref 3.5–5.3)
PROTHROMBIN TIME: 14.8 SECONDS (ref 12.3–15)
RBC # BLD AUTO: 3.57 MILLION/UL (ref 3.88–5.62)
SODIUM SERPL-SCNC: 143 MMOL/L (ref 135–147)
WBC # BLD AUTO: 5.94 THOUSAND/UL (ref 4.31–10.16)

## 2025-03-18 PROCEDURE — 85027 COMPLETE CBC AUTOMATED: CPT | Performed by: EMERGENCY MEDICINE

## 2025-03-18 PROCEDURE — 36415 COLL VENOUS BLD VENIPUNCTURE: CPT | Performed by: EMERGENCY MEDICINE

## 2025-03-18 PROCEDURE — 93005 ELECTROCARDIOGRAM TRACING: CPT

## 2025-03-18 PROCEDURE — 80048 BASIC METABOLIC PNL TOTAL CA: CPT | Performed by: EMERGENCY MEDICINE

## 2025-03-18 PROCEDURE — 82948 REAGENT STRIP/BLOOD GLUCOSE: CPT

## 2025-03-18 PROCEDURE — 70498 CT ANGIOGRAPHY NECK: CPT

## 2025-03-18 PROCEDURE — 99285 EMERGENCY DEPT VISIT HI MDM: CPT | Performed by: EMERGENCY MEDICINE

## 2025-03-18 PROCEDURE — 84484 ASSAY OF TROPONIN QUANT: CPT | Performed by: EMERGENCY MEDICINE

## 2025-03-18 PROCEDURE — 85730 THROMBOPLASTIN TIME PARTIAL: CPT | Performed by: EMERGENCY MEDICINE

## 2025-03-18 PROCEDURE — 99285 EMERGENCY DEPT VISIT HI MDM: CPT

## 2025-03-18 PROCEDURE — 70496 CT ANGIOGRAPHY HEAD: CPT

## 2025-03-18 PROCEDURE — 85610 PROTHROMBIN TIME: CPT | Performed by: EMERGENCY MEDICINE

## 2025-03-18 RX ORDER — BUPRENORPHINE 2 MG/1
16 TABLET SUBLINGUAL DAILY
COMMUNITY

## 2025-03-18 RX ORDER — ATORVASTATIN CALCIUM 40 MG/1
40 TABLET, FILM COATED ORAL EVERY EVENING
Status: DISCONTINUED | OUTPATIENT
Start: 2025-03-18 | End: 2025-03-19

## 2025-03-18 RX ORDER — LABETALOL HYDROCHLORIDE 5 MG/ML
10 INJECTION, SOLUTION INTRAVENOUS EVERY 4 HOURS PRN
Status: DISCONTINUED | OUTPATIENT
Start: 2025-03-18 | End: 2025-03-19

## 2025-03-18 RX ORDER — CHLORHEXIDINE GLUCONATE ORAL RINSE 1.2 MG/ML
15 SOLUTION DENTAL EVERY 12 HOURS SCHEDULED
Status: DISCONTINUED | OUTPATIENT
Start: 2025-03-18 | End: 2025-03-20 | Stop reason: HOSPADM

## 2025-03-18 RX ADMIN — Medication 17 MG: at 22:00

## 2025-03-18 RX ADMIN — IOHEXOL 85 ML: 350 INJECTION, SOLUTION INTRAVENOUS at 21:07

## 2025-03-18 NOTE — LETTER
Date: 03/18/25    Dear Kapil Cordova,   My name is Shanti, I am a registered nurse care manager working with [facility].  I have not been able to reach you and would like to set a time that I can talk with you over the phone .  My work is to help patients that have complex medical conditions get the care they need. This includes patients who may have been in the hospital or emergency room.  Please call me with any questions you may have at [phone]. I look forward to speaking with you.  Sincerely,  Shanti Watson RN  Outpatient Care Manager

## 2025-03-18 NOTE — PROGRESS NOTES
No answer or voicemail available to leave a message.  Unable to reach letter sent to Kapil's My chart.

## 2025-03-19 ENCOUNTER — APPOINTMENT (INPATIENT)
Dept: CT IMAGING | Facility: HOSPITAL | Age: 40
DRG: 053 | End: 2025-03-19
Payer: COMMERCIAL

## 2025-03-19 ENCOUNTER — APPOINTMENT (INPATIENT)
Dept: MRI IMAGING | Facility: HOSPITAL | Age: 40
DRG: 053 | End: 2025-03-19
Payer: COMMERCIAL

## 2025-03-19 ENCOUNTER — APPOINTMENT (INPATIENT)
Dept: NON INVASIVE DIAGNOSTICS | Facility: HOSPITAL | Age: 40
DRG: 053 | End: 2025-03-19
Payer: COMMERCIAL

## 2025-03-19 PROBLEM — R56.9 SEIZURE (HCC): Status: ACTIVE | Noted: 2025-03-19

## 2025-03-19 PROBLEM — M21.372 LEFT FOOT DROP: Status: ACTIVE | Noted: 2025-03-19

## 2025-03-19 PROBLEM — R29.90 STROKE-LIKE SYMPTOMS: Chronic | Status: ACTIVE | Noted: 2025-03-18

## 2025-03-19 LAB
ALBUMIN SERPL BCG-MCNC: 3.6 G/DL (ref 3.5–5)
ALP SERPL-CCNC: 31 U/L (ref 34–104)
ALT SERPL W P-5'-P-CCNC: 17 U/L (ref 7–52)
AMPHETAMINES SERPL QL SCN: NEGATIVE
ANION GAP SERPL CALCULATED.3IONS-SCNC: 5 MMOL/L (ref 4–13)
AST SERPL W P-5'-P-CCNC: 28 U/L (ref 13–39)
ATRIAL RATE: 77 BPM
ATRIAL RATE: 99 BPM
BARBITURATES UR QL: NEGATIVE
BASOPHILS # BLD AUTO: 0.04 THOUSANDS/ÂΜL (ref 0–0.1)
BASOPHILS NFR BLD AUTO: 1 % (ref 0–1)
BENZODIAZ UR QL: POSITIVE
BILIRUB SERPL-MCNC: 0.36 MG/DL (ref 0.2–1)
BUN SERPL-MCNC: 11 MG/DL (ref 5–25)
CA-I BLD-SCNC: 1.11 MMOL/L (ref 1.12–1.32)
CALCIUM SERPL-MCNC: 8.5 MG/DL (ref 8.4–10.2)
CHLORIDE SERPL-SCNC: 106 MMOL/L (ref 96–108)
CHOLEST SERPL-MCNC: 111 MG/DL (ref ?–200)
CO2 SERPL-SCNC: 32 MMOL/L (ref 21–32)
COCAINE UR QL: NEGATIVE
CREAT SERPL-MCNC: 0.58 MG/DL (ref 0.6–1.3)
EOSINOPHIL # BLD AUTO: 0.42 THOUSAND/ÂΜL (ref 0–0.61)
EOSINOPHIL NFR BLD AUTO: 5 % (ref 0–6)
ERYTHROCYTE [DISTWIDTH] IN BLOOD BY AUTOMATED COUNT: 11.9 % (ref 11.6–15.1)
EST. AVERAGE GLUCOSE BLD GHB EST-MCNC: 94 MG/DL
FENTANYL UR QL SCN: POSITIVE
GFR SERPL CREATININE-BSD FRML MDRD: 130 ML/MIN/1.73SQ M
GLUCOSE SERPL-MCNC: 98 MG/DL (ref 65–140)
HBA1C MFR BLD: 4.9 %
HCT VFR BLD AUTO: 30.4 % (ref 36.5–49.3)
HDLC SERPL-MCNC: 43 MG/DL
HGB BLD-MCNC: 10.2 G/DL (ref 12–17)
HIV 1+2 AB+HIV1 P24 AG SERPL QL IA: NORMAL
HIV1 P24 AG SER QL: NORMAL
HYDROCODONE UR QL SCN: NEGATIVE
IMM GRANULOCYTES # BLD AUTO: 0.02 THOUSAND/UL (ref 0–0.2)
IMM GRANULOCYTES NFR BLD AUTO: 0 % (ref 0–2)
LDLC SERPL CALC-MCNC: 57 MG/DL (ref 0–100)
LYMPHOCYTES # BLD AUTO: 2.88 THOUSANDS/ÂΜL (ref 0.6–4.47)
LYMPHOCYTES NFR BLD AUTO: 36 % (ref 14–44)
MAGNESIUM SERPL-MCNC: 1.7 MG/DL (ref 1.9–2.7)
MCH RBC QN AUTO: 30.4 PG (ref 26.8–34.3)
MCHC RBC AUTO-ENTMCNC: 33.6 G/DL (ref 31.4–37.4)
MCV RBC AUTO: 91 FL (ref 82–98)
METHADONE UR QL: NEGATIVE
MONOCYTES # BLD AUTO: 0.55 THOUSAND/ÂΜL (ref 0.17–1.22)
MONOCYTES NFR BLD AUTO: 7 % (ref 4–12)
NEUTROPHILS # BLD AUTO: 4.04 THOUSANDS/ÂΜL (ref 1.85–7.62)
NEUTS SEG NFR BLD AUTO: 51 % (ref 43–75)
NRBC BLD AUTO-RTO: 0 /100 WBCS
OPIATES UR QL SCN: POSITIVE
OXYCODONE+OXYMORPHONE UR QL SCN: NEGATIVE
P AXIS: 54 DEGREES
P AXIS: 79 DEGREES
PCP UR QL: NEGATIVE
PHOSPHATE SERPL-MCNC: 3.9 MG/DL (ref 2.7–4.5)
PLATELET # BLD AUTO: 185 THOUSANDS/UL (ref 149–390)
PMV BLD AUTO: 9.3 FL (ref 8.9–12.7)
POTASSIUM SERPL-SCNC: 3.3 MMOL/L (ref 3.5–5.3)
PR INTERVAL: 136 MS
PR INTERVAL: 142 MS
PROT SERPL-MCNC: 5.7 G/DL (ref 6.4–8.4)
QRS AXIS: 69 DEGREES
QRS AXIS: 70 DEGREES
QRSD INTERVAL: 84 MS
QRSD INTERVAL: 84 MS
QT INTERVAL: 354 MS
QT INTERVAL: 400 MS
QTC INTERVAL: 452 MS
QTC INTERVAL: 454 MS
RBC # BLD AUTO: 3.36 MILLION/UL (ref 3.88–5.62)
SODIUM SERPL-SCNC: 143 MMOL/L (ref 135–147)
T WAVE AXIS: 215 DEGREES
T WAVE AXIS: 65 DEGREES
THC UR QL: NEGATIVE
TRIGL SERPL-MCNC: 54 MG/DL (ref ?–150)
VENTRICULAR RATE: 77 BPM
VENTRICULAR RATE: 99 BPM
WBC # BLD AUTO: 7.95 THOUSAND/UL (ref 4.31–10.16)

## 2025-03-19 PROCEDURE — 87806 HIV AG W/HIV1&2 ANTB W/OPTIC: CPT

## 2025-03-19 PROCEDURE — 93010 ELECTROCARDIOGRAM REPORT: CPT | Performed by: INTERNAL MEDICINE

## 2025-03-19 PROCEDURE — 80307 DRUG TEST PRSMV CHEM ANLYZR: CPT

## 2025-03-19 PROCEDURE — 80061 LIPID PANEL: CPT

## 2025-03-19 PROCEDURE — 85025 COMPLETE CBC W/AUTO DIFF WBC: CPT

## 2025-03-19 PROCEDURE — 99223 1ST HOSP IP/OBS HIGH 75: CPT | Performed by: INTERNAL MEDICINE

## 2025-03-19 PROCEDURE — 84100 ASSAY OF PHOSPHORUS: CPT

## 2025-03-19 PROCEDURE — 70450 CT HEAD/BRAIN W/O DYE: CPT

## 2025-03-19 PROCEDURE — 83735 ASSAY OF MAGNESIUM: CPT

## 2025-03-19 PROCEDURE — 80053 COMPREHEN METABOLIC PANEL: CPT

## 2025-03-19 PROCEDURE — 83036 HEMOGLOBIN GLYCOSYLATED A1C: CPT

## 2025-03-19 PROCEDURE — 70551 MRI BRAIN STEM W/O DYE: CPT

## 2025-03-19 PROCEDURE — 99255 IP/OBS CONSLTJ NEW/EST HI 80: CPT | Performed by: PSYCHIATRY & NEUROLOGY

## 2025-03-19 PROCEDURE — 82330 ASSAY OF CALCIUM: CPT

## 2025-03-19 RX ORDER — POTASSIUM CHLORIDE 1500 MG/1
40 TABLET, EXTENDED RELEASE ORAL ONCE
Status: COMPLETED | OUTPATIENT
Start: 2025-03-19 | End: 2025-03-19

## 2025-03-19 RX ORDER — POTASSIUM CHLORIDE 14.9 MG/ML
20 INJECTION INTRAVENOUS
Status: COMPLETED | OUTPATIENT
Start: 2025-03-19 | End: 2025-03-20

## 2025-03-19 RX ORDER — LORAZEPAM 2 MG/ML
1 INJECTION INTRAMUSCULAR EVERY 4 HOURS PRN
Status: DISCONTINUED | OUTPATIENT
Start: 2025-03-19 | End: 2025-03-19

## 2025-03-19 RX ORDER — SODIUM CHLORIDE, SODIUM GLUCONATE, SODIUM ACETATE, POTASSIUM CHLORIDE, MAGNESIUM CHLORIDE, SODIUM PHOSPHATE, DIBASIC, AND POTASSIUM PHOSPHATE .53; .5; .37; .037; .03; .012; .00082 G/100ML; G/100ML; G/100ML; G/100ML; G/100ML; G/100ML; G/100ML
250 INJECTION, SOLUTION INTRAVENOUS ONCE
Status: COMPLETED | OUTPATIENT
Start: 2025-03-19 | End: 2025-03-19

## 2025-03-19 RX ORDER — DEXTROAMPHETAMINE SACCHARATE, AMPHETAMINE ASPARTATE, DEXTROAMPHETAMINE SULFATE AND AMPHETAMINE SULFATE 2.5; 2.5; 2.5; 2.5 MG/1; MG/1; MG/1; MG/1
20 TABLET ORAL 2 TIMES DAILY
Refills: 0 | Status: DISCONTINUED | OUTPATIENT
Start: 2025-03-19 | End: 2025-03-20 | Stop reason: HOSPADM

## 2025-03-19 RX ORDER — CLONAZEPAM 0.5 MG/1
2 TABLET ORAL 3 TIMES DAILY
Status: DISCONTINUED | OUTPATIENT
Start: 2025-03-19 | End: 2025-03-20 | Stop reason: HOSPADM

## 2025-03-19 RX ORDER — MAGNESIUM SULFATE HEPTAHYDRATE 40 MG/ML
2 INJECTION, SOLUTION INTRAVENOUS ONCE
Status: COMPLETED | OUTPATIENT
Start: 2025-03-19 | End: 2025-03-19

## 2025-03-19 RX ORDER — SODIUM CHLORIDE, SODIUM GLUCONATE, SODIUM ACETATE, POTASSIUM CHLORIDE, MAGNESIUM CHLORIDE, SODIUM PHOSPHATE, DIBASIC, AND POTASSIUM PHOSPHATE .53; .5; .37; .037; .03; .012; .00082 G/100ML; G/100ML; G/100ML; G/100ML; G/100ML; G/100ML; G/100ML
1000 INJECTION, SOLUTION INTRAVENOUS ONCE
Status: COMPLETED | OUTPATIENT
Start: 2025-03-19 | End: 2025-03-19

## 2025-03-19 RX ORDER — CALCIUM GLUCONATE 20 MG/ML
2 INJECTION, SOLUTION INTRAVENOUS ONCE
Status: COMPLETED | OUTPATIENT
Start: 2025-03-19 | End: 2025-03-20

## 2025-03-19 RX ORDER — BUPRENORPHINE 8 MG/1
8 TABLET SUBLINGUAL 2 TIMES DAILY
Status: DISCONTINUED | OUTPATIENT
Start: 2025-03-19 | End: 2025-03-20 | Stop reason: HOSPADM

## 2025-03-19 RX ORDER — ALBUMIN HUMAN 50 G/1000ML
25 SOLUTION INTRAVENOUS ONCE
Status: COMPLETED | OUTPATIENT
Start: 2025-03-19 | End: 2025-03-19

## 2025-03-19 RX ADMIN — CLONAZEPAM 2 MG: 0.5 TABLET ORAL at 20:28

## 2025-03-19 RX ADMIN — DEXTROAMPHETAMINE SACCHARATE, AMPHETAMINE ASPARTATE, DEXTROAMPHETAMINE SULFATE AND AMPHETAMINE SULFATE 20 MG: 2.5; 2.5; 2.5; 2.5 TABLET ORAL at 18:11

## 2025-03-19 RX ADMIN — ALBUMIN (HUMAN) 25 G: 12.5 INJECTION, SOLUTION INTRAVENOUS at 04:57

## 2025-03-19 RX ADMIN — CALCIUM GLUCONATE 2 G: 20 INJECTION, SOLUTION INTRAVENOUS at 20:31

## 2025-03-19 RX ADMIN — DEXTROAMPHETAMINE SACCHARATE, AMPHETAMINE ASPARTATE, DEXTROAMPHETAMINE SULFATE AND AMPHETAMINE SULFATE 20 MG: 2.5; 2.5; 2.5; 2.5 TABLET ORAL at 09:17

## 2025-03-19 RX ADMIN — BUPRENORPHINE 8 MG: 8 TABLET SUBLINGUAL at 18:11

## 2025-03-19 RX ADMIN — BUPRENORPHINE 8 MG: 8 TABLET SUBLINGUAL at 08:09

## 2025-03-19 RX ADMIN — POTASSIUM CHLORIDE 40 MEQ: 1500 TABLET, EXTENDED RELEASE ORAL at 07:23

## 2025-03-19 RX ADMIN — CLONAZEPAM 2 MG: 0.5 TABLET ORAL at 00:12

## 2025-03-19 RX ADMIN — POTASSIUM CHLORIDE 20 MEQ: 14.9 INJECTION, SOLUTION INTRAVENOUS at 20:31

## 2025-03-19 RX ADMIN — CLONAZEPAM 2 MG: 0.5 TABLET ORAL at 15:14

## 2025-03-19 RX ADMIN — CHLORHEXIDINE GLUCONATE 15 ML: 1.2 SOLUTION ORAL at 00:22

## 2025-03-19 RX ADMIN — SODIUM CHLORIDE, SODIUM GLUCONATE, SODIUM ACETATE, POTASSIUM CHLORIDE, MAGNESIUM CHLORIDE, SODIUM PHOSPHATE, DIBASIC, AND POTASSIUM PHOSPHATE 1000 ML: .53; .5; .37; .037; .03; .012; .00082 INJECTION, SOLUTION INTRAVENOUS at 02:03

## 2025-03-19 RX ADMIN — BUPRENORPHINE 8 MG: 8 TABLET SUBLINGUAL at 00:12

## 2025-03-19 RX ADMIN — CHLORHEXIDINE GLUCONATE 15 ML: 1.2 SOLUTION ORAL at 20:28

## 2025-03-19 RX ADMIN — CHLORHEXIDINE GLUCONATE 15 ML: 1.2 SOLUTION ORAL at 08:09

## 2025-03-19 RX ADMIN — CLONAZEPAM 2 MG: 0.5 TABLET ORAL at 08:09

## 2025-03-19 RX ADMIN — LORAZEPAM 1 MG: 2 INJECTION INTRAMUSCULAR; INTRAVENOUS at 12:44

## 2025-03-19 RX ADMIN — SODIUM CHLORIDE, SODIUM GLUCONATE, SODIUM ACETATE, POTASSIUM CHLORIDE, MAGNESIUM CHLORIDE, SODIUM PHOSPHATE, DIBASIC, AND POTASSIUM PHOSPHATE 250 ML: .53; .5; .37; .037; .03; .012; .00082 INJECTION, SOLUTION INTRAVENOUS at 07:31

## 2025-03-19 RX ADMIN — MAGNESIUM SULFATE HEPTAHYDRATE 2 G: 40 INJECTION, SOLUTION INTRAVENOUS at 07:23

## 2025-03-19 NOTE — CASE MANAGEMENT
Case Management Assessment & Discharge Planning Note    Patient name Kapil Cordova  Location ICU ICU  MRN [id number]  : 1985 Date 3/19/2025       Current Admission Date: 3/18/2025  Current Admission Diagnosis:Stroke-like symptoms   Patient Active Problem List    Diagnosis Date Noted Date Diagnosed    Seizure (HCC) 2025     Left foot drop 2025     Stroke-like symptoms 2025     Hypokalemia 2025     Chronic prescription benzodiazepine use 2025     Opioid use disorder, severe, on maintenance therapy, dependence (HCC) 2025     Overdose 2025     Elevated CK 2025     Drug abuse (HCC) 2024     Vitamin D deficiency 2024     Hypercalcemia 2024     Prolonged QT interval 2024     Attention deficit disorder (ADD) 05/10/2021     Anxiety 05/10/2021     History of ADHD 2020     Hepatitis C 2020     Polysubstance abuse (HCC) 2020       LOS (days): 1  Geometric Mean LOS (GMLOS) (days):   Days to GMLOS:     OBJECTIVE:  PATIENT READMITTED TO HOSPITAL  Risk of Unplanned Readmission Score: 17.85         Current admission status: Inpatient       Preferred Pharmacy:   [facility] #[id number] - [place], [place] - [address] [place] [id number]  Phone: [phone] Fax: [phone]    Primary Care Provider: Reese Gomes DO    Primary Insurance: [employer]  Secondary Insurance:     ASSESSMENT:  Active Health Care Proxies    There are no active Health Care Proxies on file.       Advance Directives  Does patient have a Health Care POA?: No  Was patient offered paperwork?: Yes (declined)  Does patient have Advance Directives?: No  Was patient offered paperwork?: Yes (declined)  Primary Contact: Patient declined to add contacts       Readmission Root Cause  30 Day Readmission: Yes  During your hospital stay, did someone (provider, nurse, ) explain your care to you in a way you could understand?: Yes  Did  you feel medically stable to leave the hospital?: Yes  Were you able to pay for your medication at the pharmacy?: Yes  Did you have reliable transportation to take you to your appointments?: Yes  During previous admission, was a post-acute recommendation made?: Yes  What post-acute resources were offered?: Offered, but declined (, LATESHA)  Patient was readmitted due to: stroke-like-symptoms  Action Plan: follow stroke pathway, offer substance abuse resources, refer to CRS and HOST    Patient Information  Admitted from:: Home  Mental Status: Alert  During Assessment patient was accompanied by: Not accompanied during assessment  Assessment information provided by:: Patient  Primary Caregiver: Self  Support Systems: Self  County of Residence: [place]  What city do you live in?: [place]  Home entry access options. Select all that apply.: No steps to enter home  Type of Current Residence: Apartment  Floor Level: 1  Upon entering residence, is there a bedroom on the main floor (no further steps)?: Yes  Upon entering residence, is there a bathroom on the main floor (no further steps)?: Yes  Living Arrangements: Lives w/ Spouse/significant other  Is patient a ?: No    Activities of Daily Living Prior to Admission  Functional Status: Independent  Completes ADLs independently?: Yes  Ambulates independently?: Yes  Does patient use assisted devices?: No  Does patient currently own DME?: No  Does patient have a history of Outpatient Therapy (PT/OT)?: No  Does the patient have a history of Short-Term Rehab?: No  Does patient have a history of HHC?: No  Does patient currently have HHC?: No       Patient Information Continued  Income Source: Unemployed  Does patient have prescription coverage?: Yes  Can the patient afford their medications and any related supplies (such as glucometers or test strips)?: N/A  Does patient receive dialysis treatments?: No  Does patient have a history of substance abuse?: Yes  Historical  substance use preference: Heroin  History of Withdrawal Symptoms: Delirium tremors, Other withdrawal symptoms (specify in comment)  Is patient currently in treatment for substance abuse?: Patient provided treatment options.  Does patient have a history of Mental Health Diagnosis?: Yes (anxiety; ADHD)  Is patient receiving treatment for mental health?: Yes (medication management)  Has patient received inpatient treatment related to mental health in the last 2 years?: No       Means of Transportation  Means of Transport to Appts:: Public Transportation - [facility]      Social Determinants of Health (SDOH)      Flowsheet Row Most Recent Value   Housing Stability    In the last 12 months, was there a time when you were not able to pay the mortgage or rent on time? N   In the past 12 months, how many times have you moved where you were living? 0   At any time in the past 12 months, were you homeless or living in a shelter (including now)? N   Transportation Needs    In the past 12 months, has lack of transportation kept you from medical appointments or from getting medications? no   In the past 12 months, has lack of transportation kept you from meetings, work, or from getting things needed for daily living? No   Food Insecurity    Within the past 12 months, you worried that your food would run out before you got the money to buy more. Never true   Within the past 12 months, the food you bought just didn't last and you didn't have money to get more. Never true   Utilities    In the past 12 months has the electric, gas, oil, or water company threatened to shut off services in your home? No            DISCHARGE DETAILS:    Discharge planning discussed with:: patient  Freedom of Choice: Yes  Comments - Freedom of Choice: Offered HOST, CRS and substance abuse resources  CM contacted family/caregiver?: No- see comments  Were Treatment Team discharge recommendations reviewed with patient/caregiver?: Yes  Did patient/caregiver  verbalize understanding of patient care needs?: Yes  Were patient/caregiver advised of the risks associated with not following Treatment Team discharge recommendations?: Yes    Contacts  Patient Contacts: patient declined to list contacts    Requested Home Health Care         Is the patient interested in HHC at discharge?: No    DME Referral Provided  Referral made for DME?: No    Other Referral/Resources/Interventions Provided:  Interventions: Substance Abuse Treatment    Would you like to participate in our [facility] service program?  : No - Declined    Treatment Team Recommendation: Substance Abuse Treatment, Home  Discharge Destination Plan:: Substance Abuse Treatment, Home  Transport at Discharge : Ride Share         Additional Comments: CM met with the patient at the bedside for intake assessment and discharge planning, CM introduced self and reviewed role. Patient declined to name anyone for emergency contacts, patient states his father  recently and he does not want to list anyone. CM reviewed HOST, CRS services. Patient agrees to CM placing referrals. CM department will continue to follow through discharge.

## 2025-03-19 NOTE — ASSESSMENT & PLAN NOTE
Continue home Klonopin 2mg TID  Can consider weaning pending clinical stability  Mindfulness techniques   Recommend OP therapy

## 2025-03-19 NOTE — UTILIZATION REVIEW
"Initial Clinical Review    Admission: Date/Time/Statement:   Admission Orders (From admission, onward)       Ordered        03/18/25 2239  Inpatient Admission  Once                          Orders Placed This Encounter   Procedures    Inpatient Admission     Standing Status:   Standing     Number of Occurrences:   1     Level of Care:   Critical Care [15]     Estimated length of stay:   More than 2 Midnights     Certification:   I certify that inpatient services are medically necessary for this patient for a duration of greater than two midnights. See H&P and MD Progress Notes for additional information about the patient's course of treatment.     ED Arrival Information       Expected   -    Arrival   3/18/2025 20:00    Acuity   Urgent              Means of arrival   Walk-In    Escorted by   Self    Service   Critical Care/ICU    Admission type   Emergency              Arrival complaint   fall injury/sore throat             Chief Complaint   Patient presents with    Seizure - New Onset    Medical Problem     Patient non-verbal in triage, communicating via head nods and electronic messaging. Patient reports having \"seizure\" at home prior to arrival,\"fell and hurt back\" reports when he woke up he was \"unable to speak\". Reports family heard him fall, came to check on him and saw him convulsing, family did not time seizure. Reports headache and pain to left side of body.       Initial Presentation: 39 y.o. male PMH Heroin use, SZ, L foot drop to ED as walk in presents using electronic messaging having SZ prior to arrival falling & hurt back waking w inability to speak; Family witnessed convulsing. Patient reports HA, pain L side of body & with inability to talk, and left foot weakness   EXAM  imaging neg; regained ability to walk & speak; hypokalemia. Potassium repleted, consult to Neuro for recs TNK administration w Inpatient ICU admission due to post TNK administration due to rule out stroke. Tele, monitor for " angioedema, freq neuro checks, no AC for 24 HR, gentle IVF, permissive HTN max 180/105, NPO w dysphagia screen, HGBA1c, 2D ECHO, brain MRI; repeat CTH 24 HR from tPA bolus unless MRI done within a few hours of that time frame; consults : PT/OT  Date: 3/19/2025   Day 2: ICU  [facility] 3[facility]15,   [facility] a prior financial conviction. During this time he was ordered a Clonazepam wean, but frequently did not receive medications during the medication pass, includes both the clonazepam and subutex.   Released from group home the morning of 3/18 and took approximately 1.5 mg of Clonazepam in the morning (change from normal 2mg) but missed his midday and night 2mg dose due to not having a prescription.   Reports a prior seizure history of 1 tonic clonic seizure 2 years ago when he stopped using illicitly distributed benzodiazepines, alcohol, and heroin.   States last use of illicit substances 3/13. He would like to get sober. Triggers include father's recent suicide as well as lack of contact with his daughter. Father was a motivator for his sobriety and major support system component. Support system now includes his girlfriend. Motivated by prior ICU stay for accidental overdose. Last alcoholic beverage >1 month ago  Calm, GCS 4/5/6.   PLAN  Obtain brain MRI  Maintain -180, PRN labetalol for SBP >180, monitor for withdrawal sympts.  Obtain A1c, fasting lipid panel   Start atorvastatin 40mg qhs    telemetry   SLP eval for language . Passed nrusing bedside dysphagia screen  Hold PT OT evals until safe for OOB on 3/20   Multi podus boot on L foot while non ambulatory  Consult PMR if rehab placement needed   Appreciate PT OT  Repeat NIH on discharge, maintain  K>4, phos >3, mag >2, ical>1.12   Drug abuse: CRS consult placed  OP rehabilitation eval/dispo per CM- Patient may benefit from a return to [facility]   Obtain UDS & HIV screen   Monitor skin, wbc count, and fever curve     ED  Treatment-Medication Administration from 03/18/2025 2000 to 03/18/2025 2326         Date/Time Order Dose Route Action     03/18/2025 2107 iohexol (OMNIPAQUE) 350 MG/ML injection (MULTI-DOSE) 85 mL 85 mL Intravenous Given     03/18/2025 2200 tenecteplase (TNKase) injection 17 mg 17 mg Intravenous Given     03/18/2025 2245 atorvastatin (LIPITOR) tablet 40 mg -- Oral Held Dose            Scheduled Medications:  amphetamine-dextroamphetamine, 20 mg, Oral, BID  atorvastatin, 40 mg, Oral, QPM  buprenorphine, 8 mg, Sublingual, BID  chlorhexidine, 15 mL, Mouth/Throat, Q12H ROSY  clonazePAM, 2 mg, Oral, TID      Continuous IV Infusions:     PRN Meds:  labetalol, 10 mg, Intravenous, Q4H PRN  LORazepam, 1 mg, Intravenous, Q4H PRN      ED Triage Vitals   Temperature Pulse Respirations Blood Pressure SpO2 Pain Score   03/18/25 2015 03/18/25 2015 03/18/25 2015 03/18/25 2015 03/18/25 2015 03/18/25 2330   98.2 °F (36.8 °C) 103 18 115/61 96 % No Pain     Weight (last 2 days)       Date/Time Weight    03/19/25 0543 67 (147.71)    03/18/25 2330 67 (147.71)    03/18/25 2240 67 (147.71)    03/18/25 2055 67 (147.71)    03/18/25 2015 69.3 (152.78)            Vital Signs (last 3 days)       Date/Time Temp Pulse Resp BP MAP (mmHg) SpO2 O2 Device Patient Position - Orthostatic VS [place] Coma Scale Score Pain    03/19/25 1200 -- 76 12 106/58 78 96 % -- -- -- --    03/19/25 1100 -- 75 13 109/58 76 97 % -- -- -- --    03/19/25 1000 -- 84 14 102/54 74 95 % -- -- -- --    03/19/25 0900 -- 69 13 105/59 77 97 % -- -- -- --    03/19/25 0809 -- -- -- -- -- -- -- -- -- Med Not Given for Pain - for MAR use only    03/19/25 0800 -- 71 13 100/55 72 99 % -- -- -- --    03/19/25 0700 -- 65 13 80/44 57 96 % -- -- -- --    03/19/25 0638 -- -- -- -- -- -- -- -- 15 --    03/19/25 0608 -- -- -- -- -- -- -- -- 15 --    03/19/25 0600 -- 72 13 96/51 68 95 % -- -- -- --    03/19/25 0543 -- 70 13 93/50 64 95 % -- -- -- --    03/19/25 0538 -- -- -- -- -- -- -- -- 15  --    03/19/25 0530 -- 74 18 93/50 64 97 % -- -- -- --    03/19/25 0508 -- -- -- -- -- -- -- -- 15 --    03/19/25 0500 -- 67 13 93/52 66 97 % -- -- -- --    03/19/25 0438 -- -- -- -- -- -- -- -- 15 --    03/19/25 0430 -- 75 24 85/43 62 96 % -- -- -- --    03/19/25 0408 -- -- -- -- -- -- -- -- 15 --    03/19/25 0400 97.8 °F (36.6 °C) 77 16 93/50 64 96 % None (Room air) Lying -- No Pain    03/19/25 0338 -- -- -- -- -- -- -- -- 15 --    03/19/25 0330 -- 77 15 94/55 69 96 % -- -- 15 --    03/19/25 0308 -- -- -- -- -- -- -- -- 15 --    03/19/25 0300 -- 72 16 96/52 67 98 % -- -- -- --    03/19/25 0238 -- -- -- -- -- -- -- -- 15 --    03/19/25 0230 -- 75 18 97/54 69 97 % -- -- -- --    03/19/25 0208 -- -- -- -- -- -- -- -- 15 --    03/19/25 0200 -- 78 22 76/37 51 97 % -- -- -- --    03/19/25 0138 -- -- -- -- -- -- -- -- 15 --    03/19/25 0130 -- 80 17 84/46 61 99 % -- -- -- --    03/19/25 0123 97.8 °F (36.6 °C) 75 17 -- -- 100 % -- -- -- No Pain    03/19/25 0100 -- 83 17 99/53 71 97 % -- -- -- --    03/19/25 0038 -- -- -- -- -- -- -- -- 15 --    03/19/25 0030 -- 78 18 104/58 76 100 % -- -- -- --    03/19/25 0012 -- -- -- -- -- -- -- -- -- Med Not Given for Pain - for MAR use only    03/19/25 0000 97.8 °F (36.6 °C) 78 15 103/57 73 97 % Nasal cannula Lying -- No Pain    03/18/25 2330 97.8 °F (36.6 °C) 82 13 105/57 77 99 % None (Room air) Lying 15 No Pain    03/18/25 2315 -- 81 17 101/62 -- 97 % None (Room air) Lying 15 --    03/18/25 2300 -- 79 18 104/63 79 98 % None (Room air) Lying 15 --    03/18/25 2245 -- 78 16 102/56 71 95 % None (Room air) Lying 15 --    03/18/25 2230 -- 81 16 100/62 -- -- -- Lying 15 --    03/18/25 2215 -- 79 17 99/58 -- -- -- Lying 15 --    03/18/25 2200 98.3 °F (36.8 °C) 78 17 108/65 -- 98 % -- -- 15 --    03/18/25 2145 -- 82 17 97/65 77 96 % None (Room air) Lying 15 --    03/18/25 2133 -- -- -- -- -- -- -- -- 15 --    03/18/25 2130 -- 81 17 97/53 69 97 % None (Room air) Lying -- --    03/18/25 2115  -- 80 16 108/59 77 95 % None (Room air) Lying -- --    03/18/25 2100 -- -- -- -- -- -- -- -- 15 --    03/18/25 2045 -- -- -- -- -- -- -- -- 15 --    03/18/25 2033 -- -- -- -- -- -- -- -- 15 --    03/18/25 2030 -- 95 17 134/59 85 96 % None (Room air) Lying -- --    03/18/25 2015 98.2 °F (36.8 °C) 103 18 115/61 80 96 % None (Room air) Sitting -- --              Pertinent Labs/Diagnostic Test Results:   Radiology:  CTA stroke alert (head/neck)   Final Interpretation by Kyle Hassan MD (03/18 2135)      Negative CTA head and neck for large vessel occlusion, dissection, aneurysm, or high-grade stenosis, noting vascular evaluation is mildly degraded by venous contamination.               Findings were directly discussed with Luis Palacios MD at 9:25 PM.      Workstation performed: [id number]         CT stroke alert brain   Final Interpretation by Kyle Hassan MD (03/18 2119)      No acute intracranial hemorrhage, transcortical infarction, or mass effect.      Incidentally noted disconjugate gaze.      Findings were directly discussed with Luis Palacios MD at approximately 9:11 PM.      Workstation performed: [id number]         CT head wo contrast    (Results Pending)   MRI brain wo contrast    (Results Pending)     Cardiology:  No orders to display     GI:  No orders to display           Results from last 7 days   Lab Units 03/19/25  0437 03/18/25  2057 03/15/25  0438 03/14/25  0424 03/13/25  2134   WBC Thousand/uL 7.95 5.94 9.17 7.74 8.32   HEMOGLOBIN g/dL 10.2* 10.9* 11.8* 11.2* 11.1*   HEMATOCRIT % 30.4* 32.2* 34.6* 32.7* 33.8*   PLATELETS Thousands/uL 185 217 181 160 160   TOTAL NEUT ABS Thousands/µL 4.04  --  6.25 4.17 4.88         Results from last 7 days   Lab Units 03/19/25  0437 03/18/25  2057 03/15/25  0438 03/14/25  0424 03/13/25  2134   SODIUM mmol/L 143 143 139 140 139   POTASSIUM mmol/L 3.3* 3.3* 3.4* 3.6 3.9   CHLORIDE mmol/L 106 105 102 106 104   CO2 mmol/L 32 30 31 29 30   ANION GAP mmol/L 5 8 6 5 5  "  BUN mg/dL 11 13 6 12 12   CREATININE mg/dL 0.58* 0.71 0.71 0.75 0.80   EGFR ml/min/1.73sq m 130 119 119 117 114   CALCIUM mg/dL 8.5 9.3 9.0 8.9 8.6   CALCIUM, IONIZED mmol/L 1.11*  --  1.20 1.14  --    MAGNESIUM mg/dL 1.7*  --  2.0 1.7*  --    PHOSPHORUS mg/dL 3.9  --  4.0 3.0  --      Results from last 7 days   Lab Units 03/19/25 0437 03/14/25 0424 03/13/25 2134   AST U/L 28 23 24   ALT U/L 17 20 20   ALK PHOS U/L 31* 37 42   TOTAL PROTEIN g/dL 5.7* 5.9* 5.9*   ALBUMIN g/dL 3.6 3.7 3.8   TOTAL BILIRUBIN mg/dL 0.36 0.74 0.73     Results from last 7 days   Lab Units 03/18/25 2058 03/13/25  1940   POC GLUCOSE mg/dl 131 105     Results from last 7 days   Lab Units 03/19/25  0437 03/18/25  2057 03/15/25  0438 03/14/25  0424 03/13/25  2134   GLUCOSE RANDOM mg/dL 98 143* 87 91 113         Results from last 7 days   Lab Units 03/19/25 0437   HEMOGLOBIN A1C % 4.9   EAG mg/dl 94     No results found for: \"BETA-HYDROXYBUTYRATE\"       Results from last 7 days   Lab Units 03/14/25 0024 03/13/25 2134   PH QUIANA  7.403* 7.304   PCO2 QUIANA mm Hg 42.1 50.1*   PO2 QUIANA mm Hg 25.9* 40.9   HCO3 QUIANA mmol/L 25.7 24.3   BASE EXC QUIANA mmol/L 0.8 -2.4   O2 CONTENT QUIANA ml/dL 8.6 12.0   O2 HGB, VENOUS % 48.4* 70.8         Results from last 7 days   Lab Units 03/14/25 0424 03/13/25 2134   CK TOTAL U/L 304 409*     Results from last 7 days   Lab Units 03/18/25 2252 03/18/25 2057   HS TNI 0HR ng/L  --  17   HS TNI 2HR ng/L 19  --    HSTNI D2 ng/L 2  --          Results from last 7 days   Lab Units 03/18/25 2057 03/14/25  0424 03/13/25 2134   PROTIME seconds 14.8 14.9 15.6*   INR  1.15 1.16 1.22*   PTT seconds 33  --  38*             Results from last 7 days   Lab Units 03/13/25 2134   LACTIC ACID mmol/L 0.9           Results from last 7 days   Lab Units 03/13/25 2134   AMPH/METH  Positive*   BARBITURATE UR  Positive*   BENZODIAZEPINE UR  Positive*   COCAINE UR  Negative   METHADONE URINE  Negative   OPIATE UR  Negative   PCP UR  " Negative   THC UR  Negative     Results from last 7 days   Lab Units 03/13/25  2134   ETHANOL LVL mg/dL <10   ACETAMINOPHEN LVL ug/mL <2*   SALICYLATE LVL mg/dL <5             Past Medical History:   Diagnosis Date    Heroin abuse (HCC)      Present on Admission:   Drug abuse (HCC)   Anxiety      Admitting Diagnosis: Aphasia [R47.01]  Seizure (HCC) [R56.9]  CVA (cerebral vascular accident) (HCC) [I63.9]  Stroke-like symptom [R29.90]  Weakness of left foot [R29.898]  Age/Sex: 39 y.o. male    Network Utilization Review Department  ATTENTION: Please call with any questions or concerns to [phone] and carefully listen to the prompts so that you are directed to the right person. All voicemails are confidential.   For Discharge needs, contact Care Management DC Support Team at [phone] opt. 2  Send all requests for admission clinical reviews, approved or denied determinations and any other requests to dedicated fax number below belonging to the campus where the patient is receiving treatment. List of dedicated fax numbers for the Facilities:  FACILITY NAME UR FAX NUMBER   ADMISSION DENIALS (Administrative/Medical Necessity) [phone]   DISCHARGE SUPPORT TEAM (NETWORK) [phone]   PARENT CHILD HEALTH (Maternity/NICU/Pediatrics) [phone]   [facility] [phone]   [facility] [phone]   [facility] [phone]   [facility] [phone]   [facility] [phone]   [facility] [phone]   [facility] [phone]   [facility] [phone]   [facility] [phone]   [facility] [phone]   [facility] [phone]   [facility] [phone]

## 2025-03-19 NOTE — SPEECH THERAPY NOTE
Order received and chart reviewed. Discussed with RN, who reports no overt dysphagia or communication deficits. Pt passed the nursing dysphagia screen and is on a regular diet/thin liquids. Spoke with pt at bedside. He confirms that his speech and language skills have returned to normal. He denies any dysphagia. Head CT and CTA were negative for acute change. MRI is pending. At this time full speech/swallow assessment is not indicated. Please send a new order if status changes. Screen only.

## 2025-03-19 NOTE — CONSULTS
"Consultation - Neurology   Name: Kapil Cordova 39 y.o. male I MRN: [id number]  Unit/Bed#: ICU 01 I Date of Admission: 3/18/2025   Date of Service: 3/19/2025 I Hospital Day: 1   Inpatient consult to Neurology  Consult performed by: Litzy Thakkar PA-C  Consult ordered by: Tamika Irby PA-C      Consult to Neurology  Consult performed by: Litzy Thakkar PA-C  Consult ordered by: Edin Spivey MD        Physician Requesting Evaluation: Caleb Shannon MD   Reason for Evaluation / Principal Problem: stroke like symptoms     Assessment & Plan  Stroke-like symptoms  39 y.o. right handed male with polysubstance abuse with recent overdose in March 2025,  who presented to [facility] on 3/18/25 due to seizure like activity and inability to talk.    Workup  - CTH wo contrast 3/18/25:   \"No acute intracranial hemorrhage, transcortical infarction, or mass effect. \"  - CTA H/N wwo contrast 3/18/25:   \"Negative CTA head and neck for large vessel occlusion, dissection, aneurysm, or high-grade stenosis, noting vascular evaluation is mildly degraded by venous contamination. \"  - MRI brain wo contrast 3/19/25:   \"Normal. \"  - Labs  - UDS + benzodiaziapines, opiates, fentanyl   - hemoglobin A1c 4.9   - lipid panel: total cholesterol 111, LDL 57       Plan  - TNK Stroke Pathway recommended as follows  Repeat CT Head at 24 hours post TNK (approximately: 2200 3/19 )  MRI brain completed, see above   Recommend Echo  Strict BP control <180/105 mmHg  Euglycemic, normothermic goal  Holding all antiplatelet and anticoagulation for 24 hours post TNK  Atorvastatin 40 mg daily   Telemetry monitoring  PT/OT/ST  STAT CT Head for acute changes  Frequent neuro checks.  Continue to monitor and notify neurology with any changes.  Stroke education to be provided   - Medical management and supportive care per primary team.  Correction of any metabolic or infectious disturbances.       Seizure-like activity (HCC)  - monitor off of AED at this time as pt " is without neurologic deficit other than suspected L peroneal nerve injury   - patient with  identification card, no 's license. Pt is instructed not to drive. No need for [employer] form due to no active 's license  Anxiety  - management per primary team  Drug abuse (HCC)  - encourage cessation of drug use   Hypokalemia  - management per primary team   Left foot drop      Case and plan discussed with attending neurologist, Dr. Amin.  Please see attending attestation for any further recommendations and/or changes to plan.    Recommendations for outpatient neurological follow up have yet to be determined.    History of Present Illness   Kapil Cordova is a 39 y.o. right handed male with polysubstance abuse with recent overdose in 2025,  who presented to [facility] on 3/18/25 due to seizure like activity and inability to talk.    Per ED notes: Pt reportedly had seizure-like activity an hour prior to arriving to the hospital. Pt reportedly had witnessed convulsion but states he was alert and oriented when this happened and reports he was not confused after convulsion. Following the seizure like activity, pt reportedly with L foot weakness and inability to talk. Pt able to communicate via type/text.     A stroke alert was initiated by the ED. BP on arrival 115/61. CTH without any acute intracranial abnormality. CTA H/N wwo contrast without IR target. Pt received TNK at 2200 3/18/25. Per chart review, no documented NIHSS at time of stroke alert.     Pt reports he was released from snf yesterday. Pt reports after being released from snf, he was taking a shower. Pt reports he fell in the shower. Pt reports he lost consciousness. Pt's girlfriend heard the fall and then saw him with shaking activity while unconscious. Pt reports he is not sure how long he was unconscious for. Pt reports when he came to, he was unable to speak. Pt was able to communicate with typing. Pt reports he was also unable to move  his L foot. Pt reports he had an episode in the past after a fall when he was unable to move his L foot. Pt reports sometime overnight after receiving IV TNK, he was able to talk. Pt reports continued L foot weakness.     Pt reports no illicit drug use since being hospitalized 3/13. Pt reports vaping.         Review of Systems   Neurological:  Positive for headaches (mild, denies worst headache of life). Negative for facial asymmetry and speech difficulty.        Historical Information   Past Medical History:   Diagnosis Date    Heroin abuse (HCC)      History reviewed. No pertinent surgical history.  Social History     Tobacco Use    Smoking status: Former     Current packs/day: 0.50     Average packs/day: 0.5 packs/day     Types: Cigarettes     Start date: 3/18/2025    Smokeless tobacco: Never   Vaping Use    Vaping status: Every Day    Substances: Nicotine   Substance and Sexual Activity    Alcohol use: Yes     Comment: a beer a month    Drug use: Yes     Types: Marijuana    Sexual activity: Yes     E-Cigarette/Vaping    E-Cigarette Use Current Every Day User      E-Cigarette/Vaping Substances    Nicotine Yes     THC No     CBD No     Flavoring No     Other No     Unknown No      Family History   Problem Relation Age of Onset    No Known Problems Mother     Completed Suicide  Father      Social History     Tobacco Use    Smoking status: Former     Current packs/day: 0.50     Average packs/day: 0.5 packs/day     Types: Cigarettes     Start date: 3/18/2025    Smokeless tobacco: Never   Vaping Use    Vaping status: Every Day    Substances: Nicotine   Substance and Sexual Activity    Alcohol use: Yes     Comment: a beer a month    Drug use: Yes     Types: Marijuana    Sexual activity: Yes       Current Facility-Administered Medications:     amphetamine-dextroamphetamine (ADDERALL) tablet 20 mg, BID    buprenorphine (SUBUTEX) 8 mg SL tablet 8 mg, BID    chlorhexidine (PERIDEX) 0.12 % oral rinse 15 mL, Q12H ROSY     clonazePAM (KlonoPIN) tablet 2 mg, TID    labetalol (NORMODYNE) injection 10 mg, Q4H PRN    LORazepam (ATIVAN) injection 1 mg, Q4H PRN  Prior to Admission Medications   Prescriptions Last Dose Informant Patient Reported? Taking?   Cholecalciferol (Vitamin D3) 1000 units CAPS  Self No No   Sig: Take 1 capsule by mouth daily   amphetamine-dextroamphetamine (ADDERALL) 20 mg tablet 3/17/2025  No Yes   Sig: Take 1 tablet (20 mg total) by mouth 2 (two) times a day Max Daily Amount: 40 mg   buprenorphine (SUBUTEX) 2 mg 3/18/2025 Self Yes Yes   Sig: Place 16 mg under the tongue daily   clonazePAM (KlonoPIN) 2 mg tablet 3/18/2025  No Yes   Sig: Take 1 tablet (2 mg total) by mouth 3 (three) times a day   naloxone (NARCAN) 4 mg/0.1 mL nasal spray  Self No No   Sig: Administer 1 spray into a nostril. If no response after 2-3 minutes, give another dose in the other nostril using a new spray.      Facility-Administered Medications: None     Patient has no known allergies.    Objective :  Temp:  [97.8 °F (36.6 °C)-98.3 °F (36.8 °C)] 97.8 °F (36.6 °C)  HR:  [] 76  BP: ()/(37-65) 106/58  Resp:  [12-24] 12  SpO2:  [95 %-100 %] 96 %  O2 Device: None (Room air)    Physical Exam  Vitals and nursing note reviewed.   HENT:      Head: Normocephalic and atraumatic.   Eyes:      Extraocular Movements: Extraocular movements intact.   Cardiovascular:      Rate and Rhythm: Normal rate.   Pulmonary:      Effort: Pulmonary effort is normal.   Neurological:      Mental Status: He is alert.      Cranial Nerves: No dysarthria.     Neurological Exam  Mental Status  Alert. Oriented to person, place and time. no dysarthria present. Able to name objects, repeat and read. Follows one-step commands. Language: Speech fluent. Thought content appropriate.    Cranial Nerves  CN II: Right visual acuity: Counts fingers. Left visual acuity: Counts fingers. Right normal visual field. Left normal visual field.  CN III, IV, VI: Extraocular movements  intact bilaterally.  CN V: Facial sensation is normal.  CN VII: Full and symmetric facial movement.    Motor                                               Right                     Left   Shoulder abduction               5                          5  Elbow flexion                         5                          5  Elbow extension                    5                          5  Finger flexion                         5                          5  Hip flexion                              5                          5  Plantarflexion                         5                          5  Dorsiflexion                            5                          0    Sensory  Decreased on LLE; otherwise, sensation to LT intact .     - extinction absent .    Coordination  Right: Finger-to-nose normal.Left: Finger-to-nose normal.        NIHSS:  1a.Level of Consciousness: 0 = Alert   1b. LOC Questions: 0 = Answers both correctly   1c. LOC Commands: 0 = Obeys both correctly   2. Best Gaze: 0 = Normal   3. Visual: 0 = No visual field loss   4. Facial Palsy: 0=Normal symmetric movement   5a. Motor Right Arm: 0=No drift, limb holds 90 (or 45) degrees for full 10 seconds   5b. Motor Left Arm: 0=No drift, limb holds 90 (or 45) degrees for full 10 seconds   6a. Motor Right Le=No drift, limb holds 90 (or 45) degrees for full 10 seconds   6b. Motor Left Le=No drift, limb holds 90 (or 45) degrees for full 10 seconds   7. Limb Ataxia:  0=Absent   8. Sensory: 1=Mild to moderate sensory loss; patient feels pinprick is less sharp or is dull on the affected side; there is a loss of superficial pain with pinprick but patient is aware He is being touched   9. Best Language:  0=No aphasia, normal   10. Dysarthria: 0=Normal articulation   11. Extinction and Inattention (formerly Neglect): 0=No abnormality   Total Score: 1   Completed 0842 AM 25.        Lab Results: I have reviewed the following results:CBC:   Results from last 7 days  "  Lab Units 03/19/25  0437 03/18/25  2057 03/15/25  0438   WBC Thousand/uL 7.95 5.94 9.17   RBC Million/uL 3.36* 3.57* 3.82*   HEMOGLOBIN g/dL 10.2* 10.9* 11.8*   HEMATOCRIT % 30.4* 32.2* 34.6*   MCV fL 91 90 91   PLATELETS Thousands/uL 185 217 181   , BMP/CMP:   Results from last 7 days   Lab Units 03/19/25  0437 03/18/25  2057 03/15/25  0438 03/14/25  0424 03/13/25  2134   SODIUM mmol/L 143 143 139 140 139   POTASSIUM mmol/L 3.3* 3.3* 3.4* 3.6 3.9   CHLORIDE mmol/L 106 105 102 106 104   CO2 mmol/L 32 30 31 29 30   BUN mg/dL 11 13 6 12 12   CREATININE mg/dL 0.58* 0.71 0.71 0.75 0.80   CALCIUM mg/dL 8.5 9.3 9.0 8.9 8.6   AST U/L 28  --   --  23 24   ALT U/L 17  --   --  20 20   ALK PHOS U/L 31*  --   --  37 42   EGFR ml/min/1.73sq m 130 119 119 117 114   , Vitamin B12:   , HgBA1C:   Results from last 7 days   Lab Units 03/19/25 0437   HEMOGLOBIN A1C % 4.9   , TSH:   , Coagulation:   Results from last 7 days   Lab Units 03/18/25 2057   INR  1.15   , Lipid Profile:   Results from last 7 days   Lab Units 03/19/25 0437   HDL mg/dL 43   LDL CALC mg/dL 57   TRIGLYCERIDES mg/dL 54   , Ammonia:   , Urinalysis:       Invalid input(s): \"URIBILINOGEN\", Drug Screen:   Results from last 7 days   Lab Units 03/19/25  1252 03/13/25 2134   BARBITURATE UR  Negative Positive*   BENZODIAZEPINE UR  Positive* Positive*   THC UR  Negative Negative   COCAINE UR  Negative Negative   METHADONE URINE  Negative Negative   OPIATE UR  Positive* Negative   PCP UR  Negative Negative   , Medication Drug Levels:       Invalid input(s): \"CARBAMAZEPINE\", \"OXCARBAZEPINE\"  Recent Labs     03/19/25 0437   WBC 7.95   HGB 10.2*   HCT 30.4*      SODIUM 143   K 3.3*      CO2 32   BUN 11   CREATININE 0.58*   GLUC 98   CAIONIZED 1.11*   MG 1.7*   PHOS 3.9     Imaging Results Review: I personally reviewed the following image studies in PACS and associated radiology reports: MRI brain. My interpretation of the radiology images/reports is: no " acute stroke, no ICH .      VTE Prophylaxis: Sequential compression device (Venodyne)     This note was completed in part utilizing Dragon Software.  Grammatical errors, random word insertions, spelling mistakes, and incomplete sentences may be an occasional consequence of this system secondary to software limitations, ambient noise, and hardware issues.  If you have any questions or concerns about the content, text, or information contained within the body of this dictation, please contact the provider for clarification.

## 2025-03-19 NOTE — PLAN OF CARE
Problem: Potential for Falls  Goal: Patient will remain free of falls  Description: INTERVENTIONS:  - Educate patient/family on patient safety including physical limitations  - Instruct patient to call for assistance with activity   - Consult OT/PT to assist with strengthening/mobility   - Keep Call bell within reach  - Keep bed low and locked with side rails adjusted as appropriate  - Keep care items and personal belongings within reach  - Initiate and maintain comfort rounds  - Make Fall Risk Sign visible to staff  - Offer Toileting every 2 Hours, in advance of need  - Initiate/Maintain bed alarm  - Obtain necessary fall risk management equipment:   Problem: CARDIOVASCULAR - ADULT  Goal: Maintains optimal cardiac output and hemodynamic stability  Description: INTERVENTIONS:  - Monitor I/O, vital signs and rhythm  - Monitor for S/S and trends of decreased cardiac output  - Administer and titrate ordered vasoactive medications to optimize hemodynamic stability  - Assess quality of pulses, skin color and temperature  - Assess for signs of decreased coronary artery perfusion  - Instruct patient to report change in severity of symptoms  Outcome: Progressing  Goal: Absence of cardiac dysrhythmias or at baseline rhythm  Description: INTERVENTIONS:  - Continuous cardiac monitoring, vital signs, obtain 12 lead EKG if ordered  - Administer antiarrhythmic and heart rate control medications as ordered  - Monitor electrolytes and administer replacement therapy as ordered  Outcome: Progressing     Problem: RESPIRATORY - ADULT  Goal: Achieves optimal ventilation and oxygenation  Description: INTERVENTIONS:  - Assess for changes in respiratory status  - Assess for changes in mentation and behavior  - Position to facilitate oxygenation and minimize respiratory effort  - Oxygen administered by appropriate delivery if ordered  - Initiate smoking cessation education as indicated  - Encourage broncho-pulmonary hygiene including  cough, deep breathe, Incentive Spirometry  - Assess the need for suctioning and aspirate as needed  - Assess and instruct to report SOB or any respiratory difficulty  - Respiratory Therapy support as indicated  Outcome: Progressing     Problem: GASTROINTESTINAL - ADULT  Goal: Minimal or absence of nausea and/or vomiting  Description: INTERVENTIONS:  - Administer IV fluids if ordered to ensure adequate hydration  - Maintain NPO status until nausea and vomiting are resolved  - Nasogastric tube if ordered  - Administer ordered antiemetic medications as needed  - Provide nonpharmacologic comfort measures as appropriate  - Advance diet as tolerated, if ordered  - Consider nutrition services referral to assist patient with adequate nutrition and appropriate food choices  Outcome: Progressing  Goal: Maintains or returns to baseline bowel function  Description: INTERVENTIONS:  - Assess bowel function  - Encourage oral fluids to ensure adequate hydration  - Administer IV fluids if ordered to ensure adequate hydration  - Administer ordered medications as needed  - Encourage mobilization and activity  - Consider nutritional services referral to assist patient with adequate nutrition and appropriate food choices  Outcome: Progressing  Goal: Maintains adequate nutritional intake  Description: INTERVENTIONS:  - Monitor percentage of each meal consumed  - Identify factors contributing to decreased intake, treat as appropriate  - Assist with meals as needed  - Monitor I&O, weight, and lab values if indicated  - Obtain nutrition services referral as needed  Outcome: Progressing  Goal: Establish and maintain optimal ostomy function  Description: INTERVENTIONS:  - Assess bowel function  - Encourage oral fluids to ensure adequate hydration  - Administer IV fluids if ordered to ensure adequate hydration   - Administer ordered medications as needed  - Encourage mobilization and activity  - Nutrition services referral to assist patient  with appropriate food choices  - Assess stoma site  - Consider wound care consult   Outcome: Progressing  Goal: Oral mucous membranes remain intact  Description: INTERVENTIONS  - Assess oral mucosa and hygiene practices  - Implement preventative oral hygiene regimen  - Implement oral medicated treatments as ordered  - Initiate Nutrition services referral as needed  Outcome: Progressing     Problem: GENITOURINARY - ADULT  Goal: Maintains or returns to baseline urinary function  Description: INTERVENTIONS:  - Assess urinary function  - Encourage oral fluids to ensure adequate hydration if ordered  - Administer IV fluids as ordered to ensure adequate hydration  - Administer ordered medications as needed  - Offer frequent toileting  - Follow urinary retention protocol if ordered  Outcome: Progressing  Goal: Absence of urinary retention  Description: INTERVENTIONS:  - Assess patient’s ability to void and empty bladder  - Monitor I/O  - Bladder scan as needed  - Discuss with physician/AP medications to alleviate retention as needed  - Discuss catheterization for long term situations as appropriate  Outcome: Progressing  Goal: Urinary catheter remains patent  Description: INTERVENTIONS:  - Assess patency of urinary catheter  - If patient has a chronic nieves, consider changing catheter if non-functioning  - Follow guidelines for intermittent irrigation of non-functioning urinary catheter  Outcome: Progressing     Problem: METABOLIC, FLUID AND ELECTROLYTES - ADULT  Goal: Electrolytes maintained within normal limits  Description: INTERVENTIONS:  - Monitor labs and assess patient for signs and symptoms of electrolyte imbalances  - Administer electrolyte replacement as ordered  - Monitor response to electrolyte replacements, including repeat lab results as appropriate  - Instruct patient on fluid and nutrition as appropriate  Outcome: Progressing  Goal: Fluid balance maintained  Description: INTERVENTIONS:  - Monitor labs   -  Monitor I/O and WT  - Instruct patient on fluid and nutrition as appropriate  - Assess for signs & symptoms of volume excess or deficit  Outcome: Progressing  Goal: Glucose maintained within target range  Description: INTERVENTIONS:  - Monitor Blood Glucose as ordered  - Assess for signs and symptoms of hyperglycemia and hypoglycemia  - Administer ordered medications to maintain glucose within target range  - Assess nutritional intake and initiate nutrition service referral as needed  Outcome: Progressin     Problem: HEMATOLOGIC - ADULT  Goal: Maintains hematologic stability  Description: INTERVENTIONS  - Assess for signs and symptoms of bleeding or hemorrhage  - Monitor labs  - Administer supportive blood products/factors as ordered and appropriate  Outcome: Progressing     Problem: MUSCULOSKELETAL - ADULT  Goal: Maintain or return mobility to safest level of function  Description: INTERVENTIONS:  - Assess patient's ability to carry out ADLs; assess patient's baseline for ADL function and identify physical deficits which impact ability to perform ADLs (bathing, care of mouth/teeth, toileting, grooming, dressing, etc.)  - Assess/evaluate cause of self-care deficits   - Assess range of motion  - Assess patient's mobility  - Assess patient's need for assistive devices and provide as appropriate  - Encourage maximum independence but intervene and supervise when necessary  - Involve family in performance of ADLs  - Assess for home care needs following discharge   - Consider OT consult to assist with ADL evaluation and planning for discharge  - Provide patient education as appropriate  Outcome: Progressing  Goal: Maintain proper alignment of affected body part  Description: INTERVENTIONS:  - Support, maintain and protect limb and body alignment  - Provide patient/ family with appropriate education  Outcome: Progressing     - Apply yellow socks and bracelet for high fall risk patients  - Consider moving patient to room  near nurses station  Outcome: Progressing     Problem: NEUROSENSORY - ADULT  Goal: Achieves stable or improved neurological status  Description: INTERVENTIONS  - Monitor and report changes in neurological status  - Monitor vital signs such as temperature, blood pressure, glucose, and any other labs ordered   - Initiate measures to prevent increased intracranial pressure  - Monitor for seizure activity and implement precautions if appropriate      Outcome: Progressing  Goal: Remains free of injury related to seizures activity  Description: INTERVENTIONS  - Maintain airway, patient safety  and administer oxygen as ordered  - Monitor patient for seizure activity, document and report duration and description of seizure to physician/advanced practitioner  - If seizure occurs,  ensure patient safety during seizure  - Reorient patient post seizure  - Seizure pads on all 4 side rails  - Instruct patient/family to notify RN of any seizure activity including if an aura is experienced  - Instruct patient/family to call for assistance with activity based on nursing assessment  - Administer anti-seizure medications if ordered    Outcome: Progressing  Goal: Achieves maximal functionality and self care  Description: INTERVENTIONS  - Monitor swallowing and airway patency with patient fatigue and changes in neurological status  - Encourage and assist patient to increase activity and self care.   - Encourage visually impaired, hearing impaired and aphasic patients to use assistive/communication devices  Outcome: Progressing

## 2025-03-19 NOTE — PLAN OF CARE
Problem: Potential for Falls  Goal: Patient will remain free of falls  Description: INTERVENTIONS:  - Educate patient/family on patient safety including physical limitations  - Instruct patient to call for assistance with activity   - Consult OT/PT to assist with strengthening/mobility   - Keep Call bell within reach  - Keep bed low and locked with side rails adjusted as appropriate  - Keep care items and personal belongings within reach  - Initiate and maintain comfort rounds  - Make Fall Risk Sign visible to staff  - Apply yellow socks and bracelet for high fall risk patients  - Consider moving patient to room near nurses station  Outcome: Progressing     Problem: NEUROSENSORY - ADULT  Goal: Achieves stable or improved neurological status  Description: INTERVENTIONS  - Monitor and report changes in neurological status  - Monitor vital signs such as temperature, blood pressure, glucose, and any other labs ordered   - Initiate measures to prevent increased intracranial pressure  - Monitor for seizure activity and implement precautions if appropriate      Outcome: Progressing  Goal: Remains free of injury related to seizures activity  Description: INTERVENTIONS  - Maintain airway, patient safety  and administer oxygen as ordered  - Monitor patient for seizure activity, document and report duration and description of seizure to physician/advanced practitioner  - If seizure occurs,  ensure patient safety during seizure  - Reorient patient post seizure  - Seizure pads on all 4 side rails  - Instruct patient/family to notify RN of any seizure activity including if an aura is experienced  - Instruct patient/family to call for assistance with activity based on nursing assessment  - Administer anti-seizure medications if ordered    Outcome: Progressing  Goal: Achieves maximal functionality and self care  Description: INTERVENTIONS  - Monitor swallowing and airway patency with patient fatigue and changes in neurological  status  - Encourage and assist patient to increase activity and self care.   - Encourage visually impaired, hearing impaired and aphasic patients to use assistive/communication devices  Outcome: Progressing     Problem: CARDIOVASCULAR - ADULT  Goal: Maintains optimal cardiac output and hemodynamic stability  Description: INTERVENTIONS:  - Monitor I/O, vital signs and rhythm  - Monitor for S/S and trends of decreased cardiac output  - Administer and titrate ordered vasoactive medications to optimize hemodynamic stability  - Assess quality of pulses, skin color and temperature  - Assess for signs of decreased coronary artery perfusion  - Instruct patient to report change in severity of symptoms  Outcome: Progressing  Goal: Absence of cardiac dysrhythmias or at baseline rhythm  Description: INTERVENTIONS:  - Continuous cardiac monitoring, vital signs, obtain 12 lead EKG if ordered  - Administer antiarrhythmic and heart rate control medications as ordered  - Monitor electrolytes and administer replacement therapy as ordered  Outcome: Progressing     Problem: RESPIRATORY - ADULT  Goal: Achieves optimal ventilation and oxygenation  Description: INTERVENTIONS:  - Assess for changes in respiratory status  - Assess for changes in mentation and behavior  - Position to facilitate oxygenation and minimize respiratory effort  - Oxygen administered by appropriate delivery if ordered  - Initiate smoking cessation education as indicated  - Encourage broncho-pulmonary hygiene including cough, deep breathe, Incentive Spirometry  - Assess the need for suctioning and aspirate as needed  - Assess and instruct to report SOB or any respiratory difficulty  - Respiratory Therapy support as indicated  Outcome: Progressing     Problem: GASTROINTESTINAL - ADULT  Goal: Minimal or absence of nausea and/or vomiting  Description: INTERVENTIONS:  - Administer IV fluids if ordered to ensure adequate hydration  - Maintain NPO status until nausea and  vomiting are resolved  - Nasogastric tube if ordered  - Administer ordered antiemetic medications as needed  - Provide nonpharmacologic comfort measures as appropriate  - Advance diet as tolerated, if ordered  - Consider nutrition services referral to assist patient with adequate nutrition and appropriate food choices  Outcome: Progressing  Goal: Maintains or returns to baseline bowel function  Description: INTERVENTIONS:  - Assess bowel function  - Encourage oral fluids to ensure adequate hydration  - Administer IV fluids if ordered to ensure adequate hydration  - Administer ordered medications as needed  - Encourage mobilization and activity  - Consider nutritional services referral to assist patient with adequate nutrition and appropriate food choices  Outcome: Progressing  Goal: Maintains adequate nutritional intake  Description: INTERVENTIONS:  - Monitor percentage of each meal consumed  - Identify factors contributing to decreased intake, treat as appropriate  - Assist with meals as needed  - Monitor I&O, weight, and lab values if indicated  - Obtain nutrition services referral as needed  Outcome: Progressing  Goal: Establish and maintain optimal ostomy function  Description: INTERVENTIONS:  - Assess bowel function  - Encourage oral fluids to ensure adequate hydration  - Administer IV fluids if ordered to ensure adequate hydration   - Administer ordered medications as needed  - Encourage mobilization and activity  - Nutrition services referral to assist patient with appropriate food choices  - Assess stoma site  - Consider wound care consult   Outcome: Progressing  Goal: Oral mucous membranes remain intact  Description: INTERVENTIONS  - Assess oral mucosa and hygiene practices  - Implement preventative oral hygiene regimen  - Implement oral medicated treatments as ordered  - Initiate Nutrition services referral as needed  Outcome: Progressing     Problem: GENITOURINARY - ADULT  Goal: Maintains or returns to  baseline urinary function  Description: INTERVENTIONS:  - Assess urinary function  - Encourage oral fluids to ensure adequate hydration if ordered  - Administer IV fluids as ordered to ensure adequate hydration  - Administer ordered medications as needed  - Offer frequent toileting  - Follow urinary retention protocol if ordered  Outcome: Progressing  Goal: Absence of urinary retention  Description: INTERVENTIONS:  - Assess patient’s ability to void and empty bladder  - Monitor I/O  - Bladder scan as needed  - Discuss with physician/AP medications to alleviate retention as needed  - Discuss catheterization for long term situations as appropriate  Outcome: Progressing  Goal: Urinary catheter remains patent  Description: INTERVENTIONS:  - Assess patency of urinary catheter  - If patient has a chronic nieves, consider changing catheter if non-functioning  - Follow guidelines for intermittent irrigation of non-functioning urinary catheter  Outcome: Progressing     Problem: METABOLIC, FLUID AND ELECTROLYTES - ADULT  Goal: Electrolytes maintained within normal limits  Description: INTERVENTIONS:  - Monitor labs and assess patient for signs and symptoms of electrolyte imbalances  - Administer electrolyte replacement as ordered  - Monitor response to electrolyte replacements, including repeat lab results as appropriate  - Instruct patient on fluid and nutrition as appropriate  Outcome: Progressing  Goal: Fluid balance maintained  Description: INTERVENTIONS:  - Monitor labs   - Monitor I/O and WT  - Instruct patient on fluid and nutrition as appropriate  - Assess for signs & symptoms of volume excess or deficit  Outcome: Progressing  Goal: Glucose maintained within target range  Description: INTERVENTIONS:  - Monitor Blood Glucose as ordered  - Assess for signs and symptoms of hyperglycemia and hypoglycemia  - Administer ordered medications to maintain glucose within target range  - Assess nutritional intake and initiate  nutrition service referral as needed  Outcome: Progressing     Problem: SKIN/TISSUE INTEGRITY - ADULT  Goal: Skin Integrity remains intact(Skin Breakdown Prevention)  Description: Assess:  -Inspect skin when repositioning, toileting, and assisting with ADLS  -Assess extremities for adequate circulation and sensation     Bed Management:  -Have minimal linens on bed & keep smooth, unwrinkled  -Change linens as needed when moist or perspiring    Toileting:  -Offer bedside commode    Activity:  -Encourage activity and walks on unit  -Encourage or provide ROM exercises   -Use appropriate equipment to lift or move patient in bed    Skin Care:  -Avoid use of baby powder, tape, friction and shearing, hot water or constrictive clothing  -Do not massage red bony areas  Outcome: Progressing  Goal: Incision(s), wounds(s) or drain site(s) healing without S/S of infection  Description: INTERVENTIONS  - Assess and document dressing, incision, wound bed, drain sites and surrounding tissue  - Provide patient and family education  Outcome: Progressing  Goal: Pressure injury heals and does not worsen  Description: Interventions:  - Implement low air loss mattress or specialty surface (Criteria met)  - Apply silicone foam dressing  - Apply fecal or urinary incontinence containment device   - Utilize friction reducing device or surface for transfers   - Consider nutrition services referral as needed  Outcome: Progressing     Problem: HEMATOLOGIC - ADULT  Goal: Maintains hematologic stability  Description: INTERVENTIONS  - Assess for signs and symptoms of bleeding or hemorrhage  - Monitor labs  - Administer supportive blood products/factors as ordered and appropriate  Outcome: Progressing     Problem: MUSCULOSKELETAL - ADULT  Goal: Maintain or return mobility to safest level of function  Description: INTERVENTIONS:  - Assess patient's ability to carry out ADLs; assess patient's baseline for ADL function and identify physical deficits  which impact ability to perform ADLs (bathing, care of mouth/teeth, toileting, grooming, dressing, etc.)  - Assess/evaluate cause of self-care deficits   - Assess range of motion  - Assess patient's mobility  - Assess patient's need for assistive devices and provide as appropriate  - Encourage maximum independence but intervene and supervise when necessary  - Involve family in performance of ADLs  - Assess for home care needs following discharge   - Consider OT consult to assist with ADL evaluation and planning for discharge  - Provide patient education as appropriate  Outcome: Progressing  Goal: Maintain proper alignment of affected body part  Description: INTERVENTIONS:  - Support, maintain and protect limb and body alignment  - Provide patient/ family with appropriate education  Outcome: Progressing     Problem: Nutrition/Hydration-ADULT  Goal: Nutrient/Hydration intake appropriate for improving, restoring or maintaining nutritional needs  Description: Monitor and assess patient's nutrition/hydration status for malnutrition. Collaborate with interdisciplinary team and initiate plan and interventions as ordered.  Monitor patient's weight and dietary intake as ordered or per policy. Utilize nutrition screening tool and intervene as necessary. Determine patient's food preferences and provide high-protein, high-caloric foods as appropriate.     INTERVENTIONS:  - Monitor oral intake, urinary output, labs, and treatment plans  - Assess nutrition and hydration status and recommend course of action  - Evaluate amount of meals eaten  - Assist patient with eating if necessary   - Allow adequate time for meals  - Recommend/ encourage appropriate diets, oral nutritional supplements, and vitamin/mineral supplements  - Order, calculate, and assess calorie counts as needed  - Recommend, monitor, and adjust tube feedings and TPN/PPN based on assessed needs  - Assess need for intravenous fluids  - Provide specific  nutrition/hydration education as appropriate  - Include patient/family/caregiver in decisions related to nutrition  Outcome: Progressing
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
## 2025-03-19 NOTE — ASSESSMENT & PLAN NOTE
3/18- CTH/ CTA H/N negative  S/P TNK at 2200 on 3/18    Plan:  Thrombolytic administration pathway enacted   Neuro checks per protocol  Avoid nieves/IV/arterial placements   Repeat CTH on 3/20 at 2200 unless MRI timing within a few hours of this  Obtain brain MRI  Maintain -180  PRN labetalol for SBP >180  Obtain A1c, fasting lipid panel   Start atorvastatin 40mg qhs   Monitor on telemetry   SLP eval for language   Patient passed nrusing bedside dysphagia screen  Hold PT OT evals until safe for OOB on 3/20   Consult PMR if rehab placement needed   Appreciate PT OT  Repeat NIH on discharge

## 2025-03-19 NOTE — H&P
"H&P - Critical Care/ICU   Name: Kapil Cordova 39 y.o. male I MRN: [id number]  Unit/Bed#: ICU 01 I Date of Admission: 3/18/2025   Date of Service: 3/19/2025 I Hospital Day: 1       Assessment & Plan  Drug abuse (HCC)  Hx of IV heroin  Last use 3/13     Plan:  Continue subutex 8mg film BID   CRS consult placed  OP rehabilitation eval/dispo per CM- patient is interested in services   Patient may benefit from a return to [facility]   Obtain UDS   Obtain HIV screen   Monitor skin, wbc count, and fever curve   Stroke-like symptoms  3/18- CTH/ CTA H/N negative  S/P TNK at 2200 on 3/18    Plan:  Thrombolytic administration pathway enacted   Neuro checks per protocol  Avoid nieves/IV/arterial placements   Repeat CTH on 3/20 at 2200 unless MRI timing within a few hours of this  Obtain brain MRI  Maintain -180  PRN labetalol for SBP >180  Obtain A1c, fasting lipid panel   Start atorvastatin 40mg qhs   Monitor on telemetry   SLP eval for language   Patient passed nrusing bedside dysphagia screen  Hold PT OT evals until safe for OOB on 3/20   Consult PMR if rehab placement needed   Appreciate PT OT  Repeat NIH on discharge  Hypokalemia  Replete with PO and IV K for K >4  Monitor for ileus   Monitor on telemetry while in ICU  Encourage adequate PO intake   Seizure (HCC)  3/18- CTH negative  Likely in the setting of Clonazepam withdrawl    Plan:  Restart clonazepam as below   Seizure precautions   Monitor BG   Maintain K>4, phos >3, mag >2, ical>1.12   Left foot drop  Dates back to 1/19/2017 after falling off a roof  February 2025 [facility] MRI report:  \"L3-4: No significant posterior disc herniation, spinal canal stenosis, or neural   foraminal narrowing.     L4-5: Mild diffuse annular disc bulge and facet arthrosis. Mild spinal canal   stenosis. Left foraminal/far lateral disc protrusion. Mild to moderate left   greater than right neural foraminal narrowing. Mild impingement of the exited   left L4 nerve root. " "    L5-S1: Small left foraminal disc protrusion. Facet arthrosis. No significant   spinal canal stenosis. Mild to moderate left and mild right neural foraminal   narrowing. Narrowing of the left subarticular recess.\"    Plan:  Multipodus boot placed on L foot while non-ambulatory given TNK  PT consult on 3/20  OP NSGY eval  Anxiety  Continue home Klonopin 2mg TID  Can consider weaning pending clinical stability  Mindfulness techniques   Recommend OP therapy   Disposition: Critical care    History of Present Illness   Kapil Cordova is a 39 y.o. male with PMH of heroin abuse with recent accidental overdose admission 3/13/25-3/15/25, ADHD, Hepatitis C, L foot drop who presents to the ICU s/p TNK administration for stroke like symptoms as well a srecent seizure likely 2/2 benzodiazepine withdrawl. Patient was at home in his shower when he started feeling lightheaded and had a loss of vision and subsequently fell down in the shower. +headstrike, - LOC. His girlfriend heard him fall and found him in the shower while he was having full body shakes. He stopped having full body shakes within approximately 1 minute without medication administration. Unknown urinary incontinence or tongue bite. Negative bowel incontinence. When he stopped convulsing he could not speak and his girlfriend drove him to the ER. In the ED he was noted to have dense speech apraxia, communicating only by writing on his phone, as well as L foot weakness. NIH 2. Metabolic w/u including BS WNL and CTH/ CTA H/N negative. Patient was given 17mg of TNK and transferred to the ICU. Within 1 hour of receiving TNK, his speech returned to baseline. He continued with notable L foot weakness, consistent with L foot drop. Upon further clarification when patient could speak, he believes his L foot weakness is worse than prior. He notes sensation deficit of L shin to toes which is new.     Patient states he was feeling well in the morning prior to his seizure. He " does note that he hadn't eaten in roughly 10 hours. He states since being discharged from [facility] on 3/15, he was taken to [facility] long-term for 2 days for a prior financial conviction. During this time he was ordered a Clonazepam wean, but frequently did not receive his medications during the medication pass, this includes both the clonazepam and subutex. He was released from long-term the morning of 3/18 and took approximately 1.5 mg of Clonazepam in the morning (change from normal 2mg) but missed his midday and night 2mg dose due to not having a prescription. He notes a prior seizure history of 1 tonic clonic seizure 2 years ago when he stopped using illicitly distributed benzodiazepines, alcohol, and heroin.     He states last use of illicit substances 3/13. He would like to get sober. Triggers include father's recent suicide as well as lack of contact with his daughter. Father was a motivator for his sobriety and major support system component. Support system now includes his girlfriend. He is motivated by prior ICU stay for accidental overdose. Last alcoholic beverage >1 month ago. Denies current nicotine use. He denies current suicidal ideations/attempts.     History obtained from chart review and the patient.      Historical Information   Past Medical History:  No date: Heroin abuse ([facility]) No past surgical history on file.   Current Outpatient Medications   Medication Instructions    amphetamine-dextroamphetamine (ADDERALL) 20 mg tablet 20 mg, Oral, 2 times daily    buprenorphine (SUBUTEX) 16 mg, Daily    Cholecalciferol (Vitamin D3) 1000 units CAPS 1 capsule, Oral, Daily    clonazePAM (KLONOPIN) 2 mg, Oral, 3 times daily    naloxone (NARCAN) 4 mg/0.1 mL nasal spray Administer 1 spray into a nostril. If no response after 2-3 minutes, give another dose in the other nostril using a new spray.    No Known Allergies   Social History     Tobacco Use    Smoking status: Former     Types: Cigarettes    Smokeless tobacco:  Never   Vaping Use    Vaping status: Every Day    Substances: Nicotine   Substance Use Topics    Alcohol use: Yes     Comment: a beer a month    Drug use: Yes     Types: Marijuana    Family History   Problem Relation Age of Onset    No Known Problems Mother     Completed Suicide  Father           Objective :                   Vitals I/O      Most Recent Min/Max in 24hrs   Temp 97.8 °F (36.6 °C) Temp  Min: 97.8 °F (36.6 °C)  Max: 98.3 °F (36.8 °C)   Pulse 75 Pulse  Min: 75  Max: 103   Resp 17 Resp  Min: 13  Max: 18   BP 99/53 BP  Min: 97/65  Max: 134/59   O2 Sat 100 % SpO2  Min: 95 %  Max: 100 %    No intake or output data in the 24 hours ending 03/19/25 0217    Diet Regular; Regular House    Invasive Monitoring           Physical Exam   Physical Exam  Eyes:      General: No dysconjugate gaze and no foreign body in eyeNo visual field deficit or scleral icterus.        Right eye: No discharge.         Left eye: No discharge.      Conjunctiva/sclera: Conjunctivae normal.      Pupils: Pupils are equal, round, and reactive to light.   Skin:     General: Skin is warm, dry and not mottled extremities.      Coloration: Skin is pale. Skin is not jaundiced.   HENT:      Head: Normocephalic and atraumatic.      Comments: No obvious tongue bite/laceration, but dried blood on tongue and around lips   Cardiovascular:      Rate and Rhythm: Normal rate and regular rhythm.      Pulses: Normal pulses.   Musculoskeletal:      Right lower leg: No edema.      Left lower leg: No edema.   Abdominal:      Palpations: Abdomen is soft.      Tenderness: There is no abdominal tenderness.   Constitutional:       General: He is awake.      Appearance: He is well-developed and well-nourished.      Comments: Sitting up in bed, interacting with staff  Facial hair well groomed  Calm    Pulmonary:      Effort: Pulmonary effort is normal.      Breath sounds: Normal breath sounds.   Psychiatric:         Behavior: Behavior is cooperative.    Neurological:      General: No focal deficit present.      Mental Status: He is alert, oriented to person, place, and time and oriented to person, place and time. He is calm.      GCS: GCS eye subscore is 4. GCS verbal subscore is 5. GCS motor subscore is 6.      Cranial Nerves: Cranial nerves 2-12 are intact. No cranial nerve deficit, dysarthria or facial asymmetry.      Sensory: Sensory deficit present.      Motor: Weakness. No tremor, atrophy, abnormal muscle tone or seizure activity.      Coordination: Coordination is intact. Coordination normal. Finger-Nose-Finger Test and Heel to Shin Test normal.      Gait: Gait is intact.               Achilles reflexes are 2+ on the right side and 0 on the left side.     Comments: 5/5 strength of B/L UE on flexion and extension     5/5 strength of b/l hip flexion/extension, knee flexion/extension     5/5 strength of ankle dorsiflexion and plantar flexion of R side     1/5 dorsiflexion of L and 4/5 plantar flexion of L foot     Decreased sensation of L ankle (lateral >medial)              Diagnostic Studies        Lab Results: I have reviewed the following results:     Medications:  Scheduled PRN   atorvastatin, 40 mg, QPM  buprenorphine, 8 mg, BID  chlorhexidine, 15 mL, Q12H ROSY  clonazePAM, 2 mg, TID  multi-electrolyte, 1,000 mL, Once      labetalol, 10 mg, Q4H PRN       Continuous          Labs:   CBC    Recent Labs     03/18/25 2057   WBC 5.94   HGB 10.9*   HCT 32.2*        BMP    Recent Labs     03/18/25 2057   SODIUM 143   K 3.3*      CO2 30   AGAP 8   BUN 13   CREATININE 0.71   CALCIUM 9.3       Coags    Recent Labs     03/18/25 2057   INR 1.15   PTT 33        Additional Electrolytes  No recent results       Blood Gas    No recent results  No recent results LFTs  No recent results    Infectious  No recent results  Glucose  Recent Labs     03/18/25 2057   GLUC 143*

## 2025-03-19 NOTE — CERTIFIED RECOVERY SPECIALIST
Certified  Note    Patient name: Kapil Cordova  Location: ICU 01/ICU   Mobile: Critical access hospital  Attending:  Caleb Shannon MD MRN [id number]  : 1985  Age: 39 y.o.    Sex: male Date 3/19/2025         Substance Use History:     Social History     Substance and Sexual Activity   Alcohol Use Yes    Comment: a beer a month        Social History     Substance and Sexual Activity   Drug Use Yes    Types: Marijuana        CRS attempted to engage, patient not appropriate for CRS interaction at this time.     CRS team will continue to follow through admission.     Contact information given.      Nikia Randall

## 2025-03-19 NOTE — ASSESSMENT & PLAN NOTE
"Dates back to 1/19/2017 after falling off a roof  February 2025 [facility] MRI report:  \"L3-4: No significant posterior disc herniation, spinal canal stenosis, or neural   foraminal narrowing.     L4-5: Mild diffuse annular disc bulge and facet arthrosis. Mild spinal canal   stenosis. Left foraminal/far lateral disc protrusion. Mild to moderate left   greater than right neural foraminal narrowing. Mild impingement of the exited   left L4 nerve root.     L5-S1: Small left foraminal disc protrusion. Facet arthrosis. No significant   spinal canal stenosis. Mild to moderate left and mild right neural foraminal   narrowing. Narrowing of the left subarticular recess.\"    Plan:  Multipodus boot placed on L foot while non-ambulatory given TNK  PT consult on 3/20  OP MARIA DEL CARMEN kimball  "

## 2025-03-19 NOTE — ASSESSMENT & PLAN NOTE
- monitor off of AED at this time as pt is without neurologic deficit other than suspected L peroneal nerve injury   - patient with  identification card, no 's license. Pt is instructed not to drive. No need for [employer] form due to no active 's license

## 2025-03-19 NOTE — ASSESSMENT & PLAN NOTE
Hx of IV heroin  Last use 3/13     Plan:  Continue subutex 8mg film BID   CRS consult placed  OP rehabilitation eval/dispo per CM- patient is interested in services   Patient may benefit from a return to [facility]   Obtain UDS   Obtain HIV screen   Monitor skin, wbc count, and fever curve

## 2025-03-19 NOTE — ASSESSMENT & PLAN NOTE
"39 y.o. right handed male with polysubstance abuse with recent overdose in March 2025,  who presented to [facility] on 3/18/25 due to seizure like activity and inability to talk.    Workup  - CTH wo contrast 3/18/25:   \"No acute intracranial hemorrhage, transcortical infarction, or mass effect. \"  - CTA H/N wwo contrast 3/18/25:   \"Negative CTA head and neck for large vessel occlusion, dissection, aneurysm, or high-grade stenosis, noting vascular evaluation is mildly degraded by venous contamination. \"  - MRI brain wo contrast 3/19/25:   \"Normal. \"  - Labs  - UDS + benzodiaziapines, opiates, fentanyl   - hemoglobin A1c 4.9   - lipid panel: total cholesterol 111, LDL 57       Plan  - TNK Stroke Pathway recommended as follows  Repeat CT Head at 24 hours post TNK (approximately: 2200 3/19 )  MRI brain completed, see above   Recommend Echo  Strict BP control <180/105 mmHg  Euglycemic, normothermic goal  Holding all antiplatelet and anticoagulation for 24 hours post TNK  Atorvastatin 40 mg daily   Telemetry monitoring  PT/OT/ST  STAT CT Head for acute changes  Frequent neuro checks.  Continue to monitor and notify neurology with any changes.  Stroke education to be provided   - Medical management and supportive care per primary team.  Correction of any metabolic or infectious disturbances.       "

## 2025-03-19 NOTE — UTILIZATION REVIEW
Notification of Unplanned, Urgent, or   Emergency Inpatient Admission   AUTHORIZATION REQUEST   [place]mitting Facility [place] [address]  Tax ID: [id number]  NPI: [id number]  Place of Service: Acute Care Hospital  Admission Level of Care: Inpatient  Place of Service Code: 21     Attending Physician Information  Attending Name and NPI#: Caleb Shannon Md [[id number]]  Phone: [phone]     Admission Information  Inpatient Admission Date/Time: 3/18/25 10:39 PM  Discharge Date/Time: No discharge date for patient encounter.  Admitting Diagnosis Code/Description:  Aphasia [R47.01]  Seizure (HCC) [R56.9]  CVA (cerebral vascular accident) (HCC) [I63.9]  Stroke-like symptom [R29.90]  Weakness of left foot [R29.898]     Utilization Review Contact  Nava Lundy, Utilization   Phone: [phone]  Fax: [phone]  Email: [email or web address]  Contact for approvals/pending authorizations, clinical reviews, and discharge.     Physician Advisory Services Contact  Medical Necessity Denial & Uvqd-ta-Uwox Discussion  Phone: [phone]  Fax: [phone]  Email: [email or web address]     DISCHARGE SUPPORT TEAM:  For Patients Discharge Needs & Updates  Phone: [phone] opt. 2 Fax: [phone]  Email: [email or web address]

## 2025-03-19 NOTE — ASSESSMENT & PLAN NOTE
Replete with PO and IV K for K >4  Monitor for ileus   Monitor on telemetry while in ICU  Encourage adequate PO intake

## 2025-03-19 NOTE — ASSESSMENT & PLAN NOTE
3/18- CTH negative  Likely in the setting of Clonazepam withdrawl    Plan:  Restart clonazepam as below   Seizure precautions   Monitor BG   Maintain K>4, phos >3, mag >2, ical>1.12

## 2025-03-20 ENCOUNTER — TELEPHONE (OUTPATIENT)
Age: 40
End: 2025-03-20

## 2025-03-20 VITALS
SYSTOLIC BLOOD PRESSURE: 109 MMHG | HEART RATE: 70 BPM | BODY MASS INDEX: 21.56 KG/M2 | OXYGEN SATURATION: 99 % | TEMPERATURE: 97.4 F | DIASTOLIC BLOOD PRESSURE: 79 MMHG | RESPIRATION RATE: 23 BRPM | HEIGHT: 70 IN | WEIGHT: 150.57 LBS

## 2025-03-20 LAB
ANION GAP SERPL CALCULATED.3IONS-SCNC: 4 MMOL/L (ref 4–13)
BUN SERPL-MCNC: 8 MG/DL (ref 5–25)
CA-I BLD-SCNC: 1.15 MMOL/L (ref 1.12–1.32)
CALCIUM SERPL-MCNC: 8.6 MG/DL (ref 8.4–10.2)
CHLORIDE SERPL-SCNC: 108 MMOL/L (ref 96–108)
CO2 SERPL-SCNC: 29 MMOL/L (ref 21–32)
CREAT SERPL-MCNC: 0.62 MG/DL (ref 0.6–1.3)
ERYTHROCYTE [DISTWIDTH] IN BLOOD BY AUTOMATED COUNT: 11.9 % (ref 11.6–15.1)
GFR SERPL CREATININE-BSD FRML MDRD: 126 ML/MIN/1.73SQ M
GLUCOSE SERPL-MCNC: 100 MG/DL (ref 65–140)
HCT VFR BLD AUTO: 29.3 % (ref 36.5–49.3)
HGB BLD-MCNC: 9.8 G/DL (ref 12–17)
MAGNESIUM SERPL-MCNC: 1.8 MG/DL (ref 1.9–2.7)
MCH RBC QN AUTO: 30.4 PG (ref 26.8–34.3)
MCHC RBC AUTO-ENTMCNC: 33.4 G/DL (ref 31.4–37.4)
MCV RBC AUTO: 91 FL (ref 82–98)
PLATELET # BLD AUTO: 153 THOUSANDS/UL (ref 149–390)
PMV BLD AUTO: 9.4 FL (ref 8.9–12.7)
POTASSIUM SERPL-SCNC: 4.1 MMOL/L (ref 3.5–5.3)
RBC # BLD AUTO: 3.22 MILLION/UL (ref 3.88–5.62)
SODIUM SERPL-SCNC: 141 MMOL/L (ref 135–147)
WBC # BLD AUTO: 4.88 THOUSAND/UL (ref 4.31–10.16)

## 2025-03-20 PROCEDURE — 99239 HOSP IP/OBS DSCHRG MGMT >30: CPT | Performed by: INTERNAL MEDICINE

## 2025-03-20 PROCEDURE — 97116 GAIT TRAINING THERAPY: CPT

## 2025-03-20 PROCEDURE — 83735 ASSAY OF MAGNESIUM: CPT

## 2025-03-20 PROCEDURE — 82330 ASSAY OF CALCIUM: CPT

## 2025-03-20 PROCEDURE — 97163 PT EVAL HIGH COMPLEX 45 MIN: CPT

## 2025-03-20 PROCEDURE — 80048 BASIC METABOLIC PNL TOTAL CA: CPT

## 2025-03-20 PROCEDURE — 85027 COMPLETE CBC AUTOMATED: CPT

## 2025-03-20 RX ORDER — CLONIDINE HYDROCHLORIDE 0.2 MG/1
0.2 TABLET ORAL ONCE
Status: COMPLETED | OUTPATIENT
Start: 2025-03-20 | End: 2025-03-20

## 2025-03-20 RX ORDER — MAGNESIUM SULFATE HEPTAHYDRATE 40 MG/ML
2 INJECTION, SOLUTION INTRAVENOUS ONCE
Status: COMPLETED | OUTPATIENT
Start: 2025-03-20 | End: 2025-03-20

## 2025-03-20 RX ADMIN — BUPRENORPHINE 8 MG: 8 TABLET SUBLINGUAL at 08:51

## 2025-03-20 RX ADMIN — CLONAZEPAM 2 MG: 0.5 TABLET ORAL at 08:51

## 2025-03-20 RX ADMIN — MAGNESIUM SULFATE HEPTAHYDRATE 2 G: 40 INJECTION, SOLUTION INTRAVENOUS at 06:40

## 2025-03-20 RX ADMIN — POTASSIUM CHLORIDE 20 MEQ: 14.9 INJECTION, SOLUTION INTRAVENOUS at 00:31

## 2025-03-20 RX ADMIN — CLONIDINE HYDROCHLORIDE 0.2 MG: 0.2 TABLET ORAL at 03:34

## 2025-03-20 RX ADMIN — DEXTROAMPHETAMINE SACCHARATE, AMPHETAMINE ASPARTATE, DEXTROAMPHETAMINE SULFATE AND AMPHETAMINE SULFATE 20 MG: 2.5; 2.5; 2.5; 2.5 TABLET ORAL at 08:51

## 2025-03-20 RX ADMIN — CHLORHEXIDINE GLUCONATE 15 ML: 1.2 SOLUTION ORAL at 08:51

## 2025-03-20 NOTE — CERTIFIED RECOVERY SPECIALIST
"   Time spent: 20 minutes   Consult rcvd: Y  Patient seen in: IP/ICU   Stage of change:  recurrence of use/action     Substance used: fentanly/percs  Frequency/quantity daily approx 3 bags   Last use: 03/18/2025    CRS provided introductions and explanation of service. CRS and patient engaged in conversation of hospitalization. Patient discussed needs he feels would be appropriate. CRS shared understanding.     Patient interested in going to IP treatment. Patient states his life has been going \"downhill\" since his dad committed suicide in November(patient was the one who discovered dad at their house). 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     Patient has a hx of substance use and has been in IP treatment with significant sober time as well, Patient is requesting to go to [facility] directly from hospital. CRS met with CM to relay this information and she will handle referral.     CRS educated patient about the disease of Alcohol/Substance Use Disorder, and its progression as well as the physical and medical results of long term use.     CRS will continue to follow until discharge    Resources and contact information given     -    "

## 2025-03-20 NOTE — CERTIFIED RECOVERY SPECIALIST
Certified  Note    Patient name: Kapil Cordova  Location: ICU 13/ICU 13  [facility]: [facility]  Attending:  Caleb Shannon MD MRN [id number]  : 1985  Age: 39 y.o.    Sex: male Date 3/20/2025         Substance Use History:     Social History     Substance and Sexual Activity   Alcohol Use Yes    Comment: a beer a month        Social History     Substance and Sexual Activity   Drug Use Yes    Types: Marijuana     Time spent 10 mins on phone with patient     Patient called to check what happens next with D/A treatment/placement - explained that once medically discharged and bed found that he can go into treatment.     Discussed treatment goals and opportunities with patient.     CRS messaged CM to follow up - CRS to see patient tomorrow morning          Nikia Randall

## 2025-03-20 NOTE — TELEPHONE ENCOUNTER
STILL ADMITTED;3/18/2025 - present (2 days)  [facility]      1ST ATTEMPT,     Called pt no answer, RINGS AND RINGS THEN HANGS UP NO VM...     Thank you,     Cayla           HFU/ SL ALL/ Stroke-like symptoms     DC-    ----- Message from BRENDA Ortiz sent at 3/20/2025 12:24 PM EDT -----  Regarding: HFKapil Hernandez will need follow-up in in 6 weeks with general neurology team for Other= ATTENDING  in 60 minute appointment. They will not require outpatient neurological testing.     Thank you,   Rach GUTIERREZ

## 2025-03-20 NOTE — DISCHARGE SUMMARY
Discharge Summary - Critical Care/ICU   Name: Kapil Cordova 39 y.o. male I MRN: [id number]  Unit/Bed#: ICU 13 I Date of Admission: 3/18/2025   Date of Service: 3/20/2025 I Hospital Day: 2    Admission Date: 3/18/2025 2017  Discharge Date: 03/20/25  Admitting Diagnosis: Aphasia [R47.01]  Seizure (HCC) [R56.9]  CVA (cerebral vascular accident) (HCC) [I63.9]  Stroke-like symptom [R29.90]  Weakness of left foot [R29.898]  Discharge Diagnosis:   Medical Problems       Resolved Problems  Date Reviewed: 3/15/2025   None         HPI:     Kapil Cordova is a 39 y.o. male with PMH of heroin abuse with recent accidental overdose admission 3/13/25-3/15/25, ADHD, Hepatitis C, chronic L foot drop who was admitted to the ICU s/p TNK administration for stroke like symptoms in the setting of  recent seizure likely 2/2 benzodiazepine withdrawl.    Patient was at home in his shower when he started feeling lightheaded and had a loss of vision and subsequently fell down in the shower. +headstrike, - LOC. His girlfriend heard him fall and found him in the shower while he was having full body shakes. He stopped having full body shakes within approximately 1 minute without medication administration. Unknown urinary incontinence or tongue bite. Negative bowel incontinence.     When he stopped convulsing he could not speak and his girlfriend drove him to the ER. In the ED he was noted to have dense speech apraxia, communicating only by writing on his phone, as well as L foot weakness. NIH 2. Metabolic w/u including BS WNL and CTH/ CTA H/N negative. Patient was given 17mg of TNK and transferred to the ICU. Within 1 hour of receiving TNK, his speech returned to baseline. He continued with notable L foot weakness, consistent with L foot drop. Upon further clarification when patient could speak, he believes his L foot weakness is worse than prior. He noted sensation deficit of L shin to toes which is new.      Patient states he was feeling well in  the morning prior to his seizure. He does note that he hadn't eaten in roughly 10 hours. He states since being discharged from [facility] on 3/15, he was taken to [facility] longterm for 2 days for a prior financial conviction. During this time he was ordered a Clonazepam wean, but frequently did not receive his medications during the medication pass, this includes both the clonazepam and subutex. He was released from longterm the morning of 3/18 and took approximately 1.5 mg of Clonazepam in the morning (change from normal 2mg) but missed his midday and night 2mg dose due to not having a prescription. He notes a prior seizure history of 1 tonic clonic seizure 2 years ago when he stopped using illicitly distributed benzodiazepines, alcohol, and heroin.      He states last use of illicit substances 3/13. He would like to get sober. Triggers include father's recent suicide as well as lack of contact with his daughter. Father was a motivator for his sobriety and major support system component. Support system now includes his girlfriend. He is motivated by prior ICU stay for accidental overdose. Last alcoholic beverage >1 month ago. Denies current nicotine use. He denies current suicidal ideations/attempts.            Procedures Performed: No orders of the defined types were placed in this encounter.      Summary of Hospital Course:       Assessment included Seizure disorder. Chronic left foot drop or acute on chronic in the setting of mechanical fall status post fall in the shower. Status post TNK for presumed stroke, normal subsequent imaging. Recreational drug use, last use 3/13 per patient, he may be interested in pursuing inpatient rehabilitation.   Electrolytes repleted. Monitored neurostatus, which was stable. Monitored for withdrawal symptoms, which were unremarkable. Monitored and replenished electrolytes. No acute abnormalities in Non contrast CT, CTA head/neck, MRI brain wo contrast, and CT head wo contrast.  Advised  close follow up upon discharge w/PCP and follow up with Neurology.      Patient is to have close follow-up with primary care team.    Significant Findings, Care, Treatment and Services Provided: See above     Complications: None     Condition at Discharge: stable       Discharge instructions/Information to patient and family:   See After Visit Summary (AVS) for information provided to patient and family.      Provisions for Follow-Up Care:  See after visit summary for information related to follow-up care and any pertinent home health orders.      PCP: Reese Gomes,     Disposition: See After Visit Summary for discharge disposition information.    Planned Readmission: No     Discharge Medications:  See after visit summary for reconciled discharge medications provided to patient and family.      Discharge Statement:  I have spent a total time of 60 minutes in caring for this patient on the day of the visit/encounter. >30 minutes of time was spent on: Diagnostic results, Prognosis, Risks and benefits of tx options, Instructions for management, Importance of tx compliance, Risk factor reductions, Impressions, Counseling / Coordination of care, Documenting in the medical record, Reviewing / ordering tests, medicine, procedures  , and Communicating with other healthcare professionals .

## 2025-03-20 NOTE — PHYSICAL THERAPY NOTE
PT EVALUATION 09:25-10:42  09:42-09:55    39 y.o.    [id number]    Aphasia [R47.01]  Seizure (HCC) [R56.9]  CVA (cerebral vascular accident) (HCC) [I63.9]  Stroke-like symptom [R29.90]  Weakness of left foot [R29.898]    Past Medical History:   Diagnosis Date    Heroin abuse (HCC)          History reviewed. No pertinent surgical history.     03/20/25 0955   PT Last Visit   PT Visit Date 03/20/25   Note Type   Note type Evaluation  (and treatment)   Pain Assessment   Pain Score No Pain   Restrictions/Precautions   Weight Bearing Precautions Per Order No   Other Precautions Chair Alarm;Bed Alarm;Telemetry;Fall Risk   Home Living   Type of Home Apartment   Home Layout One level  (no RASHIDA)   Home Equipment Other (Comment)  (no DME)   Additional Comments resides with girlfriend in apt without steps.   Prior Function   Level of [place] Independent with ADLs;Independent with functional mobility;Independent with IADLS   Lives With Significant other   Falls in the last 6 months 1 to 4   Comments Independent without AD use prior to admission.   General   Additional Pertinent History Pt is 38 y/o male admitted with fall, seizure like activity, r/o CVA.  TNK given.   Family/Caregiver Present No   Cognition   Overall Cognitive Status WFL   Arousal/Participation Alert   Orientation Level Oriented X4   Comments pleasant.  Flat affect.   Subjective   Subjective Does note some improvement in L foot, however still weak and numbness reported.   RUE Assessment   RUE Assessment WFL   LUE Assessment   LUE Assessment WFL   RLE Assessment   RLE Assessment WFL   LLE Assessment   LLE Assessment X   Strength LLE   LLE Overall Strength 4+/5   L Ankle Dorsiflexion 2-/5   L Ankle Plantar Flexion 2-/5   Bed Mobility   Supine to Sit 6  Modified independent   Additional items Bedrails;HOB elevated   Transfers   Sit to Stand 6  Modified independent   Additional items Bedrails   Stand to Sit 6  Modified independent   Additional items Armrests    Ambulation/Elevation   Gait pattern Improper Weight shift;Decreased foot clearance;Inconsistent elder  (occasional lateral displacement, slowed pace.)   Gait Assistance 5  Supervision   Additional items Verbal cues   Assistive Device None   Distance Amb without  ft x 1.   Ambulation/Elevation Additional Comments Gait with occasional hesitancies and decreased foot clearance on L given ankle weakness.   Balance   Static Sitting Normal   Dynamic Sitting Good   Static Standing Fair +   Dynamic Standing Fair +   Ambulatory Fair   Activity Tolerance   Activity Tolerance Patient tolerated treatment well;Patient limited by fatigue   Medical Staff Made Aware Scooter Womack   Nurse Made Aware yes   Assessment   Prognosis Good   Problem List Decreased strength;Decreased range of motion;Impaired balance;Decreased mobility;Impaired sensation   Assessment Kapil Cordova is a 39 y.o. male with PMH of heroin abuse with recent accidental overdose admission 3/13/25-3/15/25, ADHD, Hepatitis C, L foot drop who presents to the ICU s/p TNK administration for stroke like symptoms as well as recent seizure likely 2/2 benzodiazepine withdrawl. Patient was at home in his shower when he started feeling lightheaded and had a loss of vision and subsequently fell down in the shower. +headstrike, - LOC. Received TNK in ED and transferred to ICU.  L foot weakness liekly peroneal nerve paralysis ? 2* fall, speech improved p TNK.  BP control <180/105 recommended by neuro. Activity as tolerated orders and PT consulted. Prior to admission independent without AD use.  Currently presents with functional imitations relted to L ankle weakness and sensory changes. Some strength return observed with 2-/5. Shannon for bed mobility and transfers.  S for ambulation with gait hesitancy, decreased foot clearance and pace, occasional lateral displacement however no LOB.  Would benefit 1-2 sessions of PT for gait training, AD assessment for gait  stability and education for optimization of strength return L ankle. The patient's AM-PAC Basic Mobility Inpatient Short Form Raw Score is 23. A Raw score of greater than 16 suggests the patient may benefit from discharge to home. Please also refer to the recommendation of the Physical Therapist for safe discharge planning. OPPT recommended for L ankle.  See treatment note below for gait training with cane.   Goals   Patient Goals get better   STG Expiration Date 03/22/25   Short Term Goal #1 1-2 sessions: 1) Amb with least restrictive AD > 150'x1 with mod I in order to demonstrate ability to negotiate in home environment.2)  Improve overall strength and balance 1/2 grade in order to optimize ability to perform functional tasks and reduce fall risk. 3) Verbalize/demonstrate understanding with return demonstration of self assist techniques to maintain L ankle ROM and promote strength return/function.   PT Treatment Day 1   Plan   Treatment/Interventions Functional transfer training;LE strengthening/ROM;Elevations;Therapeutic exercise;Endurance training;Patient/family training;Equipment eval/education;Bed mobility;Gait training;Compensatory technique education;Continued evaluation;Spoke to nursing;OT;Spoke to case management   PT Frequency 1-2x/wk  (1-2 visits.)   Discharge Recommendation   Rehab Resource Intensity Level, PT III (Minimum Resource Intensity)  (OPPT for L foot weakness.)   Equipment Recommended Cane  (issued and sized for patient at bedside.)   AM-PAC Basic Mobility Inpatient   Turning in Flat Bed Without Bedrails 4   Lying on Back to Sitting on Edge of Flat Bed Without Bedrails 4   Moving Bed to Chair 4   Standing Up From Chair Using Arms 4   Walk in Room 4   Climb 3-5 Stairs With Railing 3   Basic Mobility Inpatient Raw Score 23   Basic Mobility Standardized Score 50.88   [facility] Highest Level Of Mobility   -HLM Goal 7: Walk 25 feet or more   -HLM Achieved 7: Walk 25 feet or more   Additional  Treatment Session   Start Time 0942   End Time 0955   Treatment Assessment Seen for treatment session to trial cane to optimize gait stability with L ankle weakness.  Gait fluency and stability noted to improve with use of cane given sensory and motor impairments.  Ambulated additional 100 ft with [place]. Issued cane.  Ed on importance in community given surface changes etc.  Also reviewed curb step navigation technique. Reviewed self assist techniques for heel cord stretching as well as DF/PF AROM ex and proprioceptive activities to promote return of L ankle  Verbalizes understanding of education and techniques.  At this time can ambulate restoratively with use of cane and nursing/restorative team  OPPT recommended. Will d/c PT.   Equipment Use cane   Additional Treatment Day 1   End of Consult   Patient Position at End of Consult Bedside chair;Bed/Chair alarm activated;All needs within reach     Hx/personal factors: fall risk , increased reliance on DME , multiple admissions , and Inability to perform current job functions , comorbidities, substance abuse.     Examination:decreased strength , decreased LE ROM, decreased balance, gait deviations, impaired sensation, and impaired posture.     Clinical: unpredictable Ongoing medical status, Risk for falls, bed/chair alarm, Continuous monitoring, Telemetry, Decreased activity tolerance compared to baseline, More restrictive AD use than baseline, and readmission. Substance abuse    Complexity: high           Rashida Tidwell, PT

## 2025-03-20 NOTE — QUICK NOTE
"39 y.o. right handed male with polysubstance abuse with recent overdose in March 2025,  who presented to [facility] on 3/18/25 due to seizure like activity and inability to talk.  Patient reportedly had witnessed convulsions, however states he was alert and oriented when this happened.  He denied postictal confusion.  Following seizure-like activity, patient had left foot drop and had difficulty speaking, however was able to communicate via the type/text.    A stroke alert was initiated by the ED. BP on arrival 115/61. CTH without any acute intracranial abnormality. CTA H/N wwo contrast without IR target. Pt received TNK at 2200 3/18/25. Per chart review, no documented NIHSS at time of stroke alert.      Workup  - CTH wo contrast 3/18/25:   \"No acute intracranial hemorrhage, transcortical infarction, or mass effect. \"  - CTA H/N wwo contrast 3/18/25:   \"Negative CTA head and neck for large vessel occlusion, dissection, aneurysm, or high-grade stenosis, noting vascular evaluation is mildly degraded by venous contamination. \"  - MRI brain wo contrast 3/19/25:   \"Normal. \"  - Labs  - UDS + benzodiaziapines, opiates, fentanyl   - hemoglobin A1c 4.9   - lipid panel: total cholesterol 111, LDL 57   - Repeat CTH 24 hrs s/p tNK negative for acute intracranial abnormality    Discussed case with attending neurologist, clinical presentation could be secondary to left peroneal nerve paralysis due to difficulties with dorsiflexion that likely resulted after his fall in the shower.  Will hold on AEDs at this time as he reported being awake and oriented during witnessed convulsions.  Recommend initiating patient on aspirin 81 mg daily until follow-up with outpatient neurology per Dr. Amin, as well as continuing PT/OT.  Please see attending neurologist attestation for further recommendations/input.    Kapil Art will need follow-up in in 6 weeks with general neurology team for Other in 60 minute appointment. They will not require " outpatient neurological testing.   Message sent to schedulers

## 2025-03-20 NOTE — DISCHARGE INSTR - AVS FIRST PAGE
You should return to the ED for any new or worsening symptoms.   You should see your PCP within 1-2 days.   You should schedule an appointment with neurology within 6 weeks.

## 2025-03-20 NOTE — NURSING NOTE
"Belongings and AVS reviewed with patient. Notified pt on HOST referral. Pt declined waiting for call from HOST. Pt stated \"I need to get to [place] [employer] to get my wallet and money.\" Discharge ordered placed and pt discharged. All IVs removed.   "

## 2025-03-20 NOTE — CASE MANAGEMENT
Case Management Discharge Planning Note    Patient name Kapil Cordova  Location ICU 13/ICU 13 MRN [id number]  : 1985 Date 3/20/2025       Current Admission Date: 3/18/2025  Current Admission Diagnosis:Stroke-like symptoms   Patient Active Problem List    Diagnosis Date Noted Date Diagnosed    Seizure-like activity (HCC) 2025     Left foot drop 2025     Stroke-like symptoms 2025     Hypokalemia 2025     Chronic prescription benzodiazepine use 2025     Opioid use disorder, severe, on maintenance therapy, dependence (HCC) 2025     Overdose 2025     Elevated CK 2025     Drug abuse (HCC) 2024     Vitamin D deficiency 2024     Hypercalcemia 2024     Prolonged QT interval 2024     Attention deficit disorder (ADD) 05/10/2021     Anxiety 05/10/2021     History of ADHD 2020     Hepatitis C 2020     Polysubstance abuse (HCC) 2020       LOS (days): 2  Geometric Mean LOS (GMLOS) (days):   Days to GMLOS:     OBJECTIVE:  Risk of Unplanned Readmission Score: 17.41         Current admission status: Inpatient   Preferred Pharmacy:   [facility]/pharmacy #[id number] - [place], [place] - [address] [place] [address]  Phone: [phone] Fax: [phone]    Primary Care Provider: Reese Gomes DO    Primary Insurance: [employer]  Secondary Insurance:     DISCHARGE DETAILS:    Discharge planning discussed with:: patient  Freedom of Choice: Yes  Comments - Freedom of Choice: HOST and CRS referrals  CM contacted family/caregiver?: No- see comments  Were Treatment Team discharge recommendations reviewed with patient/caregiver?: Yes  Did patient/caregiver verbalize understanding of patient care needs?: Yes  Were patient/caregiver advised of the risks associated with not following Treatment Team discharge recommendations?: Yes       Other Referral/Resources/Interventions Provided:  Interventions: Substance Abuse  Treatment  Referral Comments: HOST referral placed for assistance with IP detox    Would you like to participate in our [facility] service program?  : No - Declined    Treatment Team Recommendation: Substance Abuse Treatment, Home (HOST)  Discharge Destination Plan:: Home with Home Health Care  Transport at Discharge : Other (Comment), Ride Share (HOST)        Additional Comments: MARY is collaborating with  and Nikia (CRS) to get the patient placement for IP detox. Required documentation sent to HOST and patient referred via phone [phone]. MARY will continue to follow the patient through hospital discharge.

## 2025-03-20 NOTE — CASE MANAGEMENT
Case Management Discharge Planning Note    Patient name Kapil Cordova  Location ICU 13/ICU 13 MRN [id number]  : 1985 Date 3/20/2025       Current Admission Date: 3/18/2025  Current Admission Diagnosis:Stroke-like symptoms   Patient Active Problem List    Diagnosis Date Noted Date Diagnosed    Seizure-like activity (HCC) 2025     Left foot drop 2025     Stroke-like symptoms 2025     Hypokalemia 2025     Chronic prescription benzodiazepine use 2025     Opioid use disorder, severe, on maintenance therapy, dependence (HCC) 2025     Overdose 2025     Elevated CK 2025     Drug abuse (HCC) 2024     Vitamin D deficiency 2024     Hypercalcemia 2024     Prolonged QT interval 2024     Attention deficit disorder (ADD) 05/10/2021     Anxiety 05/10/2021     History of ADHD 2020     Hepatitis C 2020     Polysubstance abuse (HCC) 2020       LOS (days): 2  Geometric Mean LOS (GMLOS) (days):   Days to GMLOS:     OBJECTIVE:  Risk of Unplanned Readmission Score: 17.41         Current admission status: Inpatient   Preferred Pharmacy:   [facility]/pharmacy #[id number] [place] [address]  Phone: [phone] Fax: [phone]    Primary Care Provider: Reese Gomes DO    Primary Insurance: [employer]  Secondary Insurance:     DISCHARGE DETAILS:    Discharge planning discussed with:: patient        Other Referral/Resources/Interventions Provided:  Financial Resources Provided: Indigent Transportation  Referral Comments: Lyft ride arranged via roundtrip       Treatment Team Recommendation: Home  Discharge Destination Plan:: Home  Transport at Discharge : Ride Share     Number/Name of Dispatcher: JING [phone]     ETA of Transport (Date): 25  ETA of Transport (Time): 7558         Additional Comments: CM received and Epic secure chat message from the ICU provider that the patient will be  discharged home today and inquired about transportation home. After speaking with the patient Round trip transportation arranged to the [place] [employer] for the patient to  his valuables, patient confirmed he will be able to pay for transportation home from there. CM will continue to follow the patient through hospital discharge.

## 2025-03-20 NOTE — DISCHARGE INSTR - OTHER ORDERS
Nikia Randall   Certified     [facility]  [place] [place] [facility]  [phone]  Monday-Friday 6:45am-3:15pm     [facility]  [address] [facility] [place] [address]  [phone]  [phone]    [facility]  [address] [facility] [address]  [phone]    [facility] [phone]   [place] [phone]  [place] [phone]  [place] [phone]   [phone]  [place] [phone]     [facility]  [address] [address]  [phone]    [place]   [phone]    [facility] [place]  [address] [place]  [address]  [phone]    [facility]   [address] [facility] [place] [address]  [phone]    [place]   [address] [facility] [place] [address]  [phone]    [facility] [facility]  [address] [facility] [place] [address]  [phone]    [facility]   [address] [place] [address] [place] [address]  [phone]

## 2025-03-21 ENCOUNTER — TELEPHONE (OUTPATIENT)
Age: 40
End: 2025-03-21

## 2025-03-21 ENCOUNTER — PATIENT OUTREACH (OUTPATIENT)
Dept: CASE MANAGEMENT | Facility: OTHER | Age: 40
End: 2025-03-21

## 2025-03-21 NOTE — UTILIZATION REVIEW
NOTIFICATION OF ADMISSION DISCHARGE   This is a Notification of Discharge from [facility]. Please be advised that this patient has been discharge from our facility. Below you will find the admission and discharge date and time including the patient’s disposition.   UTILIZATION REVIEW CONTACT:  Nava Lundy  Utilization   Network Utilization Review Department  Phone: [phone] x carefully listen to the prompts. All voicemails are confidential.  Email: [email or web address]     ADMISSION INFORMATION  PRESENTATION DATE: 3/18/2025  8:17 PM  OBERVATION ADMISSION DATE: N/A  INPATIENT ADMISSION DATE: 3/18/25 10:39 PM   DISCHARGE DATE: 3/20/2025  2:03 PM   DISPOSITION:Home/Self Care    Network Utilization Review Department  ATTENTION: Please call with any questions or concerns to [phone] and carefully listen to the prompts so that you are directed to the right person. All voicemails are confidential.   For Discharge needs, contact Care Management DC Support Team at [phone] opt. 2  Send all requests for admission clinical reviews, approved or denied determinations and any other requests to dedicated fax number below belonging to the campus where the patient is receiving treatment. List of dedicated fax numbers for the Facilities:  FACILITY NAME UR FAX NUMBER   ADMISSION DENIALS (Administrative/Medical Necessity) [phone]   DISCHARGE SUPPORT TEAM ([facility]) [phone]   PARENT CHILD HEALTH (Maternity/NICU/Pediatrics) [phone]   [facility] [phone]   [facility] [phone]   [facility] [phone]   [facility] [phone]   [facility] [phone]   [facility] [phone]   [facility] [phone]   [facility] [phone]    [facility] [phone]   [facility] [phone]   [facility] [phone]   [facility] [phone]

## 2025-03-21 NOTE — PROGRESS NOTES
ADT alert received. Patient was admitted to [facility] 3/18-3/20 with stroke like symptoms.  I closed his case due to no response to any outreach attempts.  One time outreach attempted.  I called and there was no answer or voicemail.

## 2025-03-21 NOTE — TELEPHONE ENCOUNTER
UNABLE TO LEAVE PATIENT MESSAGE TO SCHEDULE TCM . TCM NEED TO BE SCHEDULED PREFERRED BY 03/22 BUT NO LATER THAN 04/3/22

## 2025-03-22 DIAGNOSIS — F41.9 ANXIETY: ICD-10-CM

## 2025-03-24 RX ORDER — CLONIDINE HYDROCHLORIDE 0.1 MG/1
TABLET ORAL
Qty: 270 TABLET | Refills: 1 | Status: SHIPPED | OUTPATIENT
Start: 2025-03-24

## 2025-03-24 NOTE — RESTORATIVE TECHNICIAN NOTE
Restorative Technician Note      Patient Name: Kapil Cordova     Restorative Tech Visit Date: 03/24/25  Note Type: Bracing, Follow-up fitting  Patient Position Upon Consult: Supine  Activity Performed: Repositioned  Brace Applied: Multipodous Boot  Additional Brace Ordered: No  Patient Position When Brace Applied: Supine  Bracing Recommendations: None  Patient Position at End of Consult: All needs within reach; Supine

## 2025-03-25 NOTE — TELEPHONE ENCOUNTER
[facility]- 3/20/2025    2nd Attempt,     Called BOTH NUMBERS ON FILE, NEITHER ARE A WORKING NUMBER.     Letter MAILED, HASN'T USED [employer] SINCE 2020...    Thank you,     Cayla

## 2025-04-14 NOTE — TELEPHONE ENCOUNTER
Medication: Zyrtec   Last office visit: 12/24/2024  Next office visit: Visit date not found  Last labs: NA  Last refill: 12/24/2024    Filled per protocol    Over due for appointment.

## 2025-05-22 ENCOUNTER — OFFICE VISIT (OUTPATIENT)
Age: 40
End: 2025-05-22
Payer: COMMERCIAL

## 2025-05-22 VITALS
HEIGHT: 70 IN | OXYGEN SATURATION: 98 % | SYSTOLIC BLOOD PRESSURE: 98 MMHG | HEART RATE: 105 BPM | DIASTOLIC BLOOD PRESSURE: 72 MMHG | WEIGHT: 161.2 LBS | BODY MASS INDEX: 23.08 KG/M2 | RESPIRATION RATE: 20 BRPM | TEMPERATURE: 98.5 F

## 2025-05-22 DIAGNOSIS — F41.9 ANXIETY: ICD-10-CM

## 2025-05-22 DIAGNOSIS — F19.10 POLYSUBSTANCE ABUSE (HCC): ICD-10-CM

## 2025-05-22 DIAGNOSIS — F90.0 ATTENTION DEFICIT HYPERACTIVITY DISORDER (ADHD), PREDOMINANTLY INATTENTIVE TYPE: Primary | ICD-10-CM

## 2025-05-22 PROCEDURE — 99214 OFFICE O/P EST MOD 30 MIN: CPT | Performed by: FAMILY MEDICINE

## 2025-05-22 RX ORDER — ALPRAZOLAM 2 MG/1
2 TABLET ORAL 3 TIMES DAILY PRN
Qty: 90 TABLET | Refills: 2 | Status: SHIPPED | OUTPATIENT
Start: 2025-05-22

## 2025-05-22 RX ORDER — ALPRAZOLAM 2 MG/1
2 TABLET ORAL 3 TIMES DAILY PRN
COMMUNITY
Start: 2025-03-12 | End: 2025-05-22 | Stop reason: SDUPTHER

## 2025-05-22 RX ORDER — DEXTROAMPHETAMINE SACCHARATE, AMPHETAMINE ASPARTATE, DEXTROAMPHETAMINE SULFATE AND AMPHETAMINE SULFATE 5; 5; 5; 5 MG/1; MG/1; MG/1; MG/1
1 TABLET ORAL 2 TIMES DAILY
Qty: 60 TABLET | Refills: 0 | Status: SHIPPED | OUTPATIENT
Start: 2025-05-22 | End: 2025-06-21

## 2025-05-22 NOTE — PROGRESS NOTES
"Name: Kapil Cordova      : 1985      MRN: [id number]  Encounter Provider: Reese Gomes DO  Encounter Date: 2025   Encounter department: [facility] PRIMARY CARE  :  Assessment & Plan  Attention deficit hyperactivity disorder (ADHD), predominantly inattentive type    Orders:  •  amphetamine-dextroamphetamine (ADDERALL) 20 mg tablet; Take 1 tablet (20 mg total) by mouth 2 (two) times a day Max Daily Amount: 40 mg    Polysubstance abuse (HCC)         Anxiety    Orders:  •  ALPRAZolam (XANAX) 2 MG tablet; Take 1 tablet (2 mg total) by mouth 3 (three) times a day as needed for anxiety           History of Present Illness   Patient presents with:  Follow-up: F/U for ADHD. He stated he is in need of adderall and alprazolam. and Patient states the clonazepam isn't really working - wishes to go back to the alprazolam.  He is also wishing to discuss a \"male issue\" difficulty getting an erection. He has noticed this since approximately October.           Review of Systems   Constitutional: Negative.    Respiratory: Negative.     Cardiovascular: Negative.    Psychiatric/Behavioral:  Positive for dysphoric mood. The patient is nervous/anxious.        Objective   BP 98/72 (BP Location: Left arm, Patient Position: Sitting, Cuff Size: Standard)   Pulse 105   Temp 98.5 °F (36.9 °C) (Temporal)   Resp 20   Ht 5' 10\" (1.778 m)   Wt 73.1 kg (161 lb 3.2 oz)   SpO2 98%   BMI 23.13 kg/m²      Physical Exam  Vitals and nursing note reviewed.   Constitutional:       Appearance: Normal appearance. He is well-developed.   HENT:      Head: Normocephalic.      Nose: Nose normal.     Eyes:      Conjunctiva/sclera: Conjunctivae normal.      Pupils: Pupils are equal, round, and reactive to light.       Cardiovascular:      Rate and Rhythm: Normal rate and regular rhythm.      Pulses: Normal pulses.      Heart sounds: Normal heart sounds.   Pulmonary:      Effort: Pulmonary effort is normal.      Breath sounds: " Normal breath sounds.   Abdominal:      General: Abdomen is flat. Bowel sounds are normal.      Palpations: Abdomen is soft.     Musculoskeletal:      Cervical back: Normal range of motion and neck supple.     Skin:     General: Skin is warm and dry.     Neurological:      General: No focal deficit present.      Mental Status: He is alert and oriented to person, place, and time.     Psychiatric:         Mood and Affect: Mood normal.         Behavior: Behavior normal.         Thought Content: Thought content normal.         Judgment: Judgment normal.

## 2025-05-22 NOTE — ASSESSMENT & PLAN NOTE
Orders:  •  ALPRAZolam (XANAX) 2 MG tablet; Take 1 tablet (2 mg total) by mouth 3 (three) times a day as needed for anxiety

## 2025-05-28 ENCOUNTER — OFFICE VISIT (OUTPATIENT)
Age: 40
End: 2025-05-28
Payer: COMMERCIAL

## 2025-05-28 VITALS
TEMPERATURE: 97.6 F | HEART RATE: 100 BPM | SYSTOLIC BLOOD PRESSURE: 98 MMHG | RESPIRATION RATE: 16 BRPM | HEIGHT: 70 IN | OXYGEN SATURATION: 98 % | WEIGHT: 161 LBS | BODY MASS INDEX: 23.05 KG/M2 | DIASTOLIC BLOOD PRESSURE: 70 MMHG

## 2025-05-28 DIAGNOSIS — F19.10 DRUG ABUSE (HCC): ICD-10-CM

## 2025-05-28 DIAGNOSIS — F41.9 ANXIETY: ICD-10-CM

## 2025-05-28 DIAGNOSIS — F90.0 ATTENTION DEFICIT HYPERACTIVITY DISORDER (ADHD), PREDOMINANTLY INATTENTIVE TYPE: Primary | ICD-10-CM

## 2025-05-28 DIAGNOSIS — F11.11 NARCOTIC ABUSE IN REMISSION (HCC): ICD-10-CM

## 2025-05-28 PROCEDURE — 99214 OFFICE O/P EST MOD 30 MIN: CPT | Performed by: FAMILY MEDICINE

## 2025-05-28 RX ORDER — LISDEXAMFETAMINE DIMESYLATE 20 MG/1
20 CAPSULE ORAL EVERY MORNING
Qty: 30 CAPSULE | Refills: 0 | Status: SHIPPED | OUTPATIENT
Start: 2025-05-28

## 2025-05-28 NOTE — ASSESSMENT & PLAN NOTE
Orders:  •  lisdexamfetamine (VYVANSE) 20 MG capsule; Take 1 capsule (20 mg total) by mouth every morning Max Daily Amount: 20 mg

## 2025-05-28 NOTE — PROGRESS NOTES
"Name: Kapil Cordova      : 1985      MRN: [id number]  Encounter Provider: Reese Gomes DO  Encounter Date: 2025   Encounter department: [facility] PRIMARY CARE  :  I did write for the Vyvanse.  He did recently fill his Adderall prescription so we will have to be done with that before we can fill the Vyvanse.  Follow-up here as scheduled.  Assessment & Plan  Narcotic abuse in remission (HCC)    Orders:  •  naloxone (NARCAN) 4 mg/0.1 mL nasal spray; Administer 1 spray into a nostril. If no response after 2-3 minutes, give another dose in the other nostril using a new spray.    Attention deficit hyperactivity disorder (ADHD), predominantly inattentive type    Orders:  •  lisdexamfetamine (VYVANSE) 20 MG capsule; Take 1 capsule (20 mg total) by mouth every morning Max Daily Amount: 20 mg    Anxiety         Drug abuse (HCC)                History of Present Illness   Patient presents with:  Follow-up: Patient would like to discuss medication. No other concerns.  LR    He has heard recently about Vyvanse and would like to try that.      Review of Systems   Constitutional: Negative.    HENT: Negative.     Eyes: Negative.    Respiratory: Negative.     Cardiovascular: Negative.    Gastrointestinal: Negative.    Endocrine: Negative.    Genitourinary: Negative.    Musculoskeletal: Negative.    Skin: Negative.    Allergic/Immunologic: Negative.    Neurological: Negative.    Hematological: Negative.    Psychiatric/Behavioral: Negative.     All other systems reviewed and are negative.      Objective   BP 98/70 (BP Location: Left arm, Patient Position: Sitting, Cuff Size: Large)   Pulse 100   Temp 97.6 °F (36.4 °C) (Temporal)   Resp 16   Ht 5' 10\" (1.778 m)   Wt 73 kg (161 lb)   SpO2 98%   BMI 23.10 kg/m²      Physical Exam  Vitals and nursing note reviewed.   Constitutional:       Appearance: He is well-developed.   HENT:      Head: Normocephalic and atraumatic.     Eyes:      Pupils: Pupils are " equal, round, and reactive to light.       Cardiovascular:      Rate and Rhythm: Normal rate.   Pulmonary:      Effort: Pulmonary effort is normal.   Abdominal:      Palpations: Abdomen is soft.     Musculoskeletal:         General: Normal range of motion.      Cervical back: Normal range of motion and neck supple.   Lymphadenopathy:      Cervical: No cervical adenopathy.     Skin:     General: Skin is warm.     Neurological:      Mental Status: He is alert and oriented to person, place, and time.     Psychiatric:         Behavior: Behavior normal.

## 2025-05-29 ENCOUNTER — TELEPHONE (OUTPATIENT)
Age: 40
End: 2025-05-29

## 2025-05-29 NOTE — TELEPHONE ENCOUNTER
Auth needed for Lisdexamfetamine (vyvanse) 20mg. Take 1 daily. Dispense qty 30. Dx: F90.0.     *Must finish Adderall before starting this med as he recently got this med filled*  T/F: Adderall, Clonazepam, Clonidine  Was prescribed naltrexone due to hx of drug abuse.     CMM Key: [id number]  Prescribed by Dr Gomes  [facility] [address] [facility]

## 2025-05-29 NOTE — TELEPHONE ENCOUNTER
PA for     lisdexamfetamine (VYVANSE) 20 MG capsule   SUBMITTED to [employer]    via    [x]CMM-KEY: [id number]  []Surescripts-Case ID #   []Availity-Auth ID # NDC #   []Faxed to plan   []Other website   []Phone call Case ID #     [x]PA sent as URGENT    All office notes, labs and other pertaining documents and studies sent. Clinical questions answered. Awaiting determination from insurance company.     Turnaround time for your insurance to make a decision on your Prior Authorization can take 7-21 business days.

## 2025-06-02 DIAGNOSIS — F19.10 DRUG ABUSE (HCC): ICD-10-CM

## 2025-06-02 DIAGNOSIS — F90.0 ATTENTION DEFICIT HYPERACTIVITY DISORDER (ADHD), PREDOMINANTLY INATTENTIVE TYPE: Primary | ICD-10-CM

## 2025-06-02 DIAGNOSIS — Z86.59 HISTORY OF ADHD: ICD-10-CM

## 2025-06-02 DIAGNOSIS — F11.20 OPIOID USE DISORDER, SEVERE, ON MAINTENANCE THERAPY, DEPENDENCE (HCC): ICD-10-CM

## 2025-06-02 DIAGNOSIS — F19.10 POLYSUBSTANCE ABUSE (HCC): ICD-10-CM

## 2025-06-02 NOTE — TELEPHONE ENCOUNTER
Denied. Insurance is requiring reasons why drug screen tested positive for non-prescribed drugs including oxycodone, fentanyl, tramadol, and buprenorphine.   Patient has history of drug use / abuse including recent hospitalizations for mental health and fall due to drug overdose. Narcan was prescribed with the medication. And gets buprenorphine prescribed by another physician. Would you like to try for appeal with this information?   Also, can you clarify if all risks/benefits were discussed with the patient regarding medication and his history?

## 2025-06-02 NOTE — TELEPHONE ENCOUNTER
PA for     lisdexamfetamine (VYVANSE) 20 MG capsule   DENIED    Reason:(Screenshot if applicable)      Message sent to office clinical pool Yes    Denial letter scanned into Media Yes    We can gladly do an appeal but the process can take about 30-60 days to provide determination. Please have the office staff schedule a Peer to Peer at phone [phone]. If an appeal is truly warranted please have Provider send clinical documentation to the PA department to support the appeal.     **Please follow up with your patient regarding denial and next steps**

## 2025-06-02 NOTE — TELEPHONE ENCOUNTER
I attempted to contact patient but was unable to speak to him or leave a voicemail. Vmail box not set up. I will send him a [employer] message asking him to call back.

## 2025-06-18 ENCOUNTER — TELEPHONE (OUTPATIENT)
Age: 40
End: 2025-06-18

## 2025-06-18 NOTE — TELEPHONE ENCOUNTER
Attempted to call the patient to reschedule his appointment. Could not leave a voicemail as the voicemail box had not been set up yet.

## 2025-06-27 ENCOUNTER — TELEPHONE (OUTPATIENT)
Dept: PSYCHIATRY | Facility: CLINIC | Age: 40
End: 2025-06-27

## 2025-06-27 NOTE — TELEPHONE ENCOUNTER
NP referral to the [facility] received form pt's PCP for drug abuse. Attempt made to call pt but there was no answer and no VM. Will try back at a later time.    For your review.

## 2025-07-08 ENCOUNTER — HOSPITAL ENCOUNTER (EMERGENCY)
Facility: HOSPITAL | Age: 40
Discharge: HOME | End: 2025-07-09
Payer: COMMERCIAL

## 2025-07-08 DIAGNOSIS — F19.929 DRUG INTOXICATION (HCC): Primary | ICD-10-CM

## 2025-07-08 LAB
ATRIAL RATE: 80 BPM
GLUCOSE SERPL-MCNC: 92 MG/DL (ref 65–140)
P AXIS: 66 DEGREES
PR INTERVAL: 148 MS
QRS AXIS: 66 DEGREES
QRSD INTERVAL: 88 MS
QT INTERVAL: 374 MS
QTC INTERVAL: 431 MS
T WAVE AXIS: 40 DEGREES
VENTRICULAR RATE: 80 BPM

## 2025-07-08 PROCEDURE — 93005 ELECTROCARDIOGRAM TRACING: CPT

## 2025-07-08 PROCEDURE — 99284 EMERGENCY DEPT VISIT MOD MDM: CPT

## 2025-07-08 PROCEDURE — 82948 REAGENT STRIP/BLOOD GLUCOSE: CPT

## 2025-07-08 PROCEDURE — 93010 ELECTROCARDIOGRAM REPORT: CPT | Performed by: STUDENT IN AN ORGANIZED HEALTH CARE EDUCATION/TRAINING PROGRAM

## 2025-07-09 VITALS
OXYGEN SATURATION: 98 % | BODY MASS INDEX: 22.81 KG/M2 | WEIGHT: 158.95 LBS | SYSTOLIC BLOOD PRESSURE: 101 MMHG | HEART RATE: 87 BPM | DIASTOLIC BLOOD PRESSURE: 70 MMHG | TEMPERATURE: 97.6 F | RESPIRATION RATE: 14 BRPM

## 2025-07-09 NOTE — ED NOTES
Patient noted to have container with white powdery substance in it. Hospital supervisor and security called to bedside to dispose of substance at this time.     Laura Edwards RN  07/08/25 3522

## 2025-07-09 NOTE — ED PROVIDER NOTES
Time reflects when diagnosis was documented in both MDM as applicable and the Disposition within this note       Time User Action Codes Description Comment    7/9/2025  1:22 AM Delvin Barros Add [F19.929] Drug intoxication (HCC)           ED Disposition       ED Disposition   Discharge    Condition   Stable    Date/Time   Wed Jul 9, 2025  1:22 AM    Comment   Kapil Cordova discharge to home/self care.                   Assessment & Plan       Medical Decision Making  Patient with history as below presented with suspected drug overdose. Patient presents hemodynamically stable with vitals within normal limits. I considered the patient's chronic medical conditions including their drug abuse in forming my differential diagnosis, plan, and my medical decision making. I also reviewed their external records including their admission 3/18/2025. History obtained from patient as well as nursing report given patient's intermittent somnolence.    Differential diagnosis includes: Drug overdose, glucose derangement, arrhythmia.     Plan: Initial testing to include fingerstick glucose, ECG.  Will monitor for sobriety    After initial evaluation and testing, ECG independently interpreted by myself without acute ischemic changes as below.  Fingerstick glucose normal.  Reassessed the patient and they continue to be well appearing, clinically sober, following multistep commands and ambulating with steady gait and without complaint.  Suspect presentation secondary to drug overdose.  Given Narcan for outpatient.  Given drug and alcohol resources.  Stable for outpatient management.    Disposition: Discharged with instructions to obtain outpatient follow up of patient's symptoms and findings, with strict return precautions if patient develops new or worsening symptoms. Patient understands this plan and is agreeable. All questions answered. Patient discharged home with return precautions.    Amount and/or Complexity of Data  "Reviewed  Labs: ordered.    Risk  Prescription drug management.        ED Course as of 07/09/25 0909   Tue Jul 08, 2025   2317 Procedure Note: EKG  Date/Time: 07/08/25 11:17 PM   Interpreted by: Delvin Barros   Indications / Diagnosis: overdose  ECG reviewed by me, the ED Provider: yes   The EKG demonstrates:  Rhythm: normal sinus  Intervals: normal intervals  Axis: normal axis  QRS/Blocks: normal QRS  ST Changes: No acute ST Changes, no STD/RASHIDA.   Wed Jul 09, 2025   0421 Patient alert and oriented.  With no complaints.  Clinically sober.  Normal neurological exam.  Says that he \"just fell asleep\" and that is why he was brought into the emergency department.  Will provide community Narcan and discharge home as he seems to be at baseline with no complaint.       Medications - No data to display    ED Risk Strat Scores                   Clinical Opiate Withdrawal Scale       Row Name 07/08/25 2247 07/08/25 2330 07/09/25 0230          Pulse -- -- --      Resting Pulse Rate: Measured After Patient is Sitting or Lying for One Minute 0  -EG 1  -EG 1  -EG      GI Upset: Over Last Half Hour 0  -EG 0  -EG 2  -EG      Sweating: Over Past Half Hour Not Accounted for by Room Temperature of Patient Activity 2  -EG 2  -EG 0  -EG      Tremor: Observation of Outstretched Hands 2  -EG 0  -EG 0  -EG      Restlessness: Observation During Assessment 0  -EG 0  -EG 0  -EG      Yawning: Observation During Assessment 1  -EG 0  -EG 0  -EG      Pupil Size 1  -EG 1  -EG 1  -EG      Anxiety and Irritability 0  -EG 0  -EG 0  -EG      Bone or Joint Aches: If Patient was Having Pain Previously, Only the Additional Component Attributed to Opiate Withdrawal is Scored 1  -EG 0  -EG 0  -EG      Gooseflesh Skin 0  -EG 0  -EG 0  -EG      Runny Nose or Tearing: Not Accounted for by Cold Symptoms or Allergies 0  -EG 0  -EG 0  -EG      Clinical Opiate Withdrawal Scale Total Score 7  -EG 4  -EG 4  -EG      Heart Rate Source -- -- --      Patient Position " - Orthostatic VS -- -- --                User Key  (r) = Recorded By, (t) = Taken By, (c) = Cosigned By      Initials Name    EG Mike Ramírez RN                  No data recorded                            History of Present Illness       Chief Complaint   Patient presents with    Medical Problem     Pt found by EMS laying on the ground. EMS stated they honked the horn and pt woke up. EMS stated initially pt confused but answered all questions. Pt stated got kicked out of gf house. Pt states fell and L hip pain. Pt states he took his medications today suboxone and alprazolam. Pt is lethargic and came in shaking         Past Medical History[1]   Past Surgical History[2]   Family History[3]   Social History[4]   E-Cigarette/Vaping    E-Cigarette Use Current Every Day User       E-Cigarette/Vaping Substances    Nicotine Yes     THC No     CBD No     Flavoring No     Other No     Unknown No       I have reviewed and agree with the history as documented.     Patient is a 39-year-old male with significant past medical history of drug abuse, presenting for evaluation of a suspected drug overdose.  Patient reportedly was in an argument with his significant other and kicked out of the house.  He was found lying on the ground by EMS sleeping.  Patient appeared to be intoxicated/on drugs when they found him sedated when admitted for medical evaluation.  Patient initially said that he was having some hip pain, but now declining this.  He denies falling or injuring himself.  He is able to tell me that he is at the hospital.  He says that he did take some of his Suboxone as well as his Xanax.  He denies taking more than prescribed, but is frequently falling asleep during exam.  Patient otherwise without specific complaint.        Review of Systems   Respiratory:  Negative for shortness of breath.    Cardiovascular:  Negative for chest pain.   Gastrointestinal:  Negative for abdominal pain.   Neurological:  Negative for  headaches.           Objective       ED Triage Vitals [07/08/25 2247]   Temperature Pulse Blood Pressure Respirations SpO2 Patient Position - Orthostatic VS   97.6 °F (36.4 °C) 78 131/84 14 97 % Lying      Temp Source Heart Rate Source BP Location FiO2 (%) Pain Score    Oral Monitor Right arm -- 3      Vitals      Date and Time Temp Pulse SpO2 Resp BP Pain Score FACES Pain Rating User   07/09/25 0400 -- 87 98 % 14 101/70 -- -- EG   07/09/25 0230 -- 84 98 % 14 100/62 -- -- EG   07/09/25 0200 -- 85 97 % 13 106/66 -- -- EG   07/09/25 0100 -- 88 97 % 13 104/68 -- -- EG   07/09/25 0030 -- 93 97 % 13 101/59 -- -- EG   07/09/25 0000 -- 90 96 % 14 104/56 -- -- EG   07/08/25 2330 -- 82 96 % 13 102/59 -- -- EG   07/08/25 2315 -- 81 99 % 12 105/68 -- -- EG   07/08/25 2247 97.6 °F (36.4 °C) 78 97 % 14 131/84 3 -- EG            Physical Exam  Vitals and nursing note reviewed.   Constitutional:       General: He is not in acute distress.     Appearance: He is not ill-appearing or toxic-appearing.   HENT:      Head: Normocephalic and atraumatic.     Cardiovascular:      Rate and Rhythm: Normal rate.      Heart sounds: Normal heart sounds. No murmur heard.     No friction rub. No gallop.   Pulmonary:      Effort: Pulmonary effort is normal. No respiratory distress.      Breath sounds: Normal breath sounds.   Abdominal:      General: Abdomen is flat. There is no distension.      Palpations: Abdomen is soft. There is no mass.      Tenderness: There is no abdominal tenderness.   Genitourinary:     Comments: Deferred    Musculoskeletal:      Comments: No external signs of trauma.     Skin:     General: Skin is warm and dry.     Neurological:      General: No focal deficit present.      Mental Status: He is alert.      Comments: Patient is somnolent but easily arousable.  He is following all commands.  Moving upper and lower extremities to command as well as purposefully without any weakness.  He falls asleep at times during exam making  further neurological evaluation difficult, but he does not have any focal deficits.       Results Reviewed       Procedure Component Value Units Date/Time    Fingerstick Glucose (POCT) [[id number]]  (Normal) Collected: 07/08/25 2311    Lab Status: Final result Specimen: Blood Updated: 07/08/25 2312     POC Glucose 92 mg/dl             No orders to display       Procedures    ED Medication and Procedure Management   Prior to Admission Medications   Prescriptions Last Dose Informant Patient Reported? Taking?   ALPRAZolam (XANAX) 2 MG tablet   No No   Sig: Take 1 tablet (2 mg total) by mouth 3 (three) times a day as needed for anxiety   Cholecalciferol (Vitamin D3) 1000 units CAPS  Self No No   Sig: Take 1 capsule by mouth daily   buprenorphine (SUBUTEX) 2 mg  Self Yes No   Sig: Place 16 mg under the tongue in the morning.   lisdexamfetamine (VYVANSE) 20 MG capsule   No No   Sig: Take 1 capsule (20 mg total) by mouth every morning Max Daily Amount: 20 mg   naloxone (NARCAN) 4 mg/0.1 mL nasal spray   No No   Sig: Administer 1 spray into a nostril. If no response after 2-3 minutes, give another dose in the other nostril using a new spray.      Facility-Administered Medications: None     Discharge Medication List as of 7/9/2025  4:15 AM        CONTINUE these medications which have NOT CHANGED    Details   ALPRAZolam (XANAX) 2 MG tablet Take 1 tablet (2 mg total) by mouth 3 (three) times a day as needed for anxiety, Starting Thu 5/22/2025, Normal      buprenorphine (SUBUTEX) 2 mg Place 16 mg under the tongue in the morning., Historical Med      Cholecalciferol (Vitamin D3) 1000 units CAPS Take 1 capsule by mouth daily, Starting Thu 6/27/2024, Until Thu 5/22/2025, Normal      lisdexamfetamine (VYVANSE) 20 MG capsule Take 1 capsule (20 mg total) by mouth every morning Max Daily Amount: 20 mg, Starting Wed 5/28/2025, Normal      naloxone (NARCAN) 4 mg/0.1 mL nasal spray Administer 1 spray into a nostril. If no response after  2-3 minutes, give another dose in the other nostril using a new spray., Normal           No discharge procedures on file.  ED SEPSIS DOCUMENTATION   Time reflects when diagnosis was documented in both MDM as applicable and the Disposition within this note       Time User Action Codes Description Comment    7/9/2025  1:22 AM Delvin Barros Add [F19.929] Drug intoxication (HCC)                      [1]   Past Medical History:  Diagnosis Date    Heroin abuse (HCC)    [2] No past surgical history on file.  [3]   Family History  Problem Relation Name Age of Onset    No Known Problems Mother      Completed Suicide  Father     [4]   Social History  Tobacco Use    Smoking status: Former     Current packs/day: 0.50     Average packs/day: 0.5 packs/day for 0.3 years (0.2 ttl pk-yrs)     Types: Cigarettes     Start date: 3/18/2025    Smokeless tobacco: Never   Vaping Use    Vaping status: Every Day    Substances: Nicotine   Substance Use Topics    Alcohol use: Not Currently     Comment: a beer a month    Drug use: Yes     Types: Marijuana        Delvin Barros DO  07/09/25 0909

## 2025-08-11 ENCOUNTER — OFFICE VISIT (OUTPATIENT)
Age: 40
End: 2025-08-11
Payer: COMMERCIAL

## 2025-08-11 ENCOUNTER — TELEPHONE (OUTPATIENT)
Dept: OTHER | Facility: OTHER | Age: 40
End: 2025-08-11

## 2025-08-13 ENCOUNTER — TELEPHONE (OUTPATIENT)
Age: 40
End: 2025-08-13

## 2025-08-15 ENCOUNTER — TELEPHONE (OUTPATIENT)
Age: 40
End: 2025-08-15